# Patient Record
Sex: FEMALE | Race: WHITE | Employment: FULL TIME | URBAN - METROPOLITAN AREA
[De-identification: names, ages, dates, MRNs, and addresses within clinical notes are randomized per-mention and may not be internally consistent; named-entity substitution may affect disease eponyms.]

---

## 2017-04-04 ENCOUNTER — HOSPITAL ENCOUNTER (OUTPATIENT)
Dept: GENERAL RADIOLOGY | Age: 57
Discharge: HOME | End: 2017-04-04
Payer: COMMERCIAL

## 2017-04-04 DIAGNOSIS — M25.552 LEFT HIP PAIN: ICD-10-CM

## 2017-04-04 PROCEDURE — 73502 X-RAY EXAM HIP UNI 2-3 VIEWS: CPT

## 2017-05-10 ENCOUNTER — HOSPITAL ENCOUNTER (OUTPATIENT)
Dept: GENERAL RADIOLOGY | Age: 57
Discharge: HOME | End: 2017-05-10
Payer: COMMERCIAL

## 2017-05-10 DIAGNOSIS — M77.31 CALCANEAL SPUR OF RIGHT FOOT: ICD-10-CM

## 2017-05-10 PROCEDURE — 73630 X-RAY EXAM OF FOOT: CPT

## 2017-06-01 ENCOUNTER — HOSPITAL ENCOUNTER (OUTPATIENT)
Dept: MRI IMAGING | Age: 57
Discharge: HOME | End: 2017-06-01
Payer: COMMERCIAL

## 2017-06-01 DIAGNOSIS — M05.9 SEROPOSITIVE RHEUMATOID ARTHRITIS (HCC): ICD-10-CM

## 2017-06-01 DIAGNOSIS — M16.7 OTHER UNILATERAL SECONDARY OSTEOARTHRITIS OF HIP: ICD-10-CM

## 2017-06-01 DIAGNOSIS — M25.552 LEFT HIP PAIN: ICD-10-CM

## 2017-06-01 PROCEDURE — 73721 MRI JNT OF LWR EXTRE W/O DYE: CPT

## 2017-06-09 ENCOUNTER — APPOINTMENT (OUTPATIENT)
Dept: CT IMAGING | Age: 57
End: 2017-06-09
Attending: EMERGENCY MEDICINE
Payer: COMMERCIAL

## 2017-06-09 ENCOUNTER — HOSPITAL ENCOUNTER (EMERGENCY)
Age: 57
Discharge: HOME | End: 2017-06-09
Attending: EMERGENCY MEDICINE
Payer: COMMERCIAL

## 2017-06-09 VITALS
SYSTOLIC BLOOD PRESSURE: 147 MMHG | WEIGHT: 255.4 LBS | DIASTOLIC BLOOD PRESSURE: 80 MMHG | TEMPERATURE: 97.7 F | RESPIRATION RATE: 14 BRPM | HEART RATE: 65 BPM | HEIGHT: 68 IN | OXYGEN SATURATION: 97 % | BODY MASS INDEX: 38.71 KG/M2

## 2017-06-09 DIAGNOSIS — M54.50 ACUTE BILATERAL LOW BACK PAIN WITHOUT SCIATICA: ICD-10-CM

## 2017-06-09 DIAGNOSIS — G43.009 NONINTRACTABLE MIGRAINE, UNSPECIFIED MIGRAINE TYPE: Primary | ICD-10-CM

## 2017-06-09 LAB
ALBUMIN SERPL BCP-MCNC: 3.3 G/DL (ref 3.5–5)
ALBUMIN/GLOB SERPL: 1 {RATIO} (ref 1.1–2.2)
ALP SERPL-CCNC: 66 U/L (ref 45–117)
ALT SERPL-CCNC: 31 U/L (ref 12–78)
ANION GAP BLD CALC-SCNC: 9 MMOL/L (ref 5–15)
APPEARANCE UR: CLEAR
AST SERPL W P-5'-P-CCNC: 21 U/L (ref 15–37)
BACTERIA URNS QL MICRO: NEGATIVE /HPF
BASOPHILS # BLD AUTO: 0 K/UL (ref 0–0.1)
BASOPHILS # BLD: 1 % (ref 0–1)
BILIRUB SERPL-MCNC: 0.2 MG/DL (ref 0.2–1)
BILIRUB UR QL: NEGATIVE
BUN SERPL-MCNC: 33 MG/DL (ref 6–20)
BUN/CREAT SERPL: 35 (ref 12–20)
CALCIUM SERPL-MCNC: 9.2 MG/DL (ref 8.5–10.1)
CHLORIDE SERPL-SCNC: 107 MMOL/L (ref 97–108)
CO2 SERPL-SCNC: 25 MMOL/L (ref 21–32)
COLOR UR: ABNORMAL
CREAT SERPL-MCNC: 0.93 MG/DL (ref 0.55–1.02)
EOSINOPHIL # BLD: 0.1 K/UL (ref 0–0.4)
EOSINOPHIL NFR BLD: 3 % (ref 0–7)
EPITH CASTS URNS QL MICRO: ABNORMAL /LPF
ERYTHROCYTE [DISTWIDTH] IN BLOOD BY AUTOMATED COUNT: 14.3 % (ref 11.5–14.5)
GLOBULIN SER CALC-MCNC: 3.4 G/DL (ref 2–4)
GLUCOSE SERPL-MCNC: 113 MG/DL (ref 65–100)
GLUCOSE UR STRIP.AUTO-MCNC: NEGATIVE MG/DL
HCT VFR BLD AUTO: 38.5 % (ref 35–47)
HGB BLD-MCNC: 12.3 G/DL (ref 11.5–16)
HGB UR QL STRIP: NEGATIVE
HYALINE CASTS URNS QL MICRO: ABNORMAL /LPF (ref 0–5)
KETONES UR QL STRIP.AUTO: NEGATIVE MG/DL
LEUKOCYTE ESTERASE UR QL STRIP.AUTO: ABNORMAL
LYMPHOCYTES # BLD AUTO: 28 % (ref 12–49)
LYMPHOCYTES # BLD: 1.5 K/UL (ref 0.8–3.5)
MCH RBC QN AUTO: 27.7 PG (ref 26–34)
MCHC RBC AUTO-ENTMCNC: 31.9 G/DL (ref 30–36.5)
MCV RBC AUTO: 86.7 FL (ref 80–99)
MONOCYTES # BLD: 0.3 K/UL (ref 0–1)
MONOCYTES NFR BLD AUTO: 6 % (ref 5–13)
NEUTS SEG # BLD: 3.3 K/UL (ref 1.8–8)
NEUTS SEG NFR BLD AUTO: 62 % (ref 32–75)
NITRITE UR QL STRIP.AUTO: NEGATIVE
PH UR STRIP: 6.5 [PH] (ref 5–8)
PLATELET # BLD AUTO: 242 K/UL (ref 150–400)
POTASSIUM SERPL-SCNC: 4.1 MMOL/L (ref 3.5–5.1)
PROT SERPL-MCNC: 6.7 G/DL (ref 6.4–8.2)
PROT UR STRIP-MCNC: NEGATIVE MG/DL
RBC # BLD AUTO: 4.44 M/UL (ref 3.8–5.2)
RBC #/AREA URNS HPF: ABNORMAL /HPF (ref 0–5)
SODIUM SERPL-SCNC: 141 MMOL/L (ref 136–145)
SP GR UR REFRACTOMETRY: 1.02 (ref 1–1.03)
UA: UC IF INDICATED,UAUC: ABNORMAL
UROBILINOGEN UR QL STRIP.AUTO: 0.2 EU/DL (ref 0.2–1)
WBC # BLD AUTO: 5.3 K/UL (ref 3.6–11)
WBC URNS QL MICRO: ABNORMAL /HPF (ref 0–4)

## 2017-06-09 PROCEDURE — 36415 COLL VENOUS BLD VENIPUNCTURE: CPT | Performed by: EMERGENCY MEDICINE

## 2017-06-09 PROCEDURE — 96375 TX/PRO/DX INJ NEW DRUG ADDON: CPT

## 2017-06-09 PROCEDURE — 96361 HYDRATE IV INFUSION ADD-ON: CPT

## 2017-06-09 PROCEDURE — 74011250636 HC RX REV CODE- 250/636: Performed by: EMERGENCY MEDICINE

## 2017-06-09 PROCEDURE — 70450 CT HEAD/BRAIN W/O DYE: CPT

## 2017-06-09 PROCEDURE — 80053 COMPREHEN METABOLIC PANEL: CPT | Performed by: EMERGENCY MEDICINE

## 2017-06-09 PROCEDURE — 99283 EMERGENCY DEPT VISIT LOW MDM: CPT

## 2017-06-09 PROCEDURE — 96374 THER/PROPH/DIAG INJ IV PUSH: CPT

## 2017-06-09 PROCEDURE — 85025 COMPLETE CBC W/AUTO DIFF WBC: CPT | Performed by: EMERGENCY MEDICINE

## 2017-06-09 PROCEDURE — 81001 URINALYSIS AUTO W/SCOPE: CPT | Performed by: EMERGENCY MEDICINE

## 2017-06-09 RX ORDER — KETOROLAC TROMETHAMINE 30 MG/ML
30 INJECTION, SOLUTION INTRAMUSCULAR; INTRAVENOUS ONCE
Status: COMPLETED | OUTPATIENT
Start: 2017-06-09 | End: 2017-06-09

## 2017-06-09 RX ORDER — METOCLOPRAMIDE HYDROCHLORIDE 5 MG/ML
10 INJECTION INTRAMUSCULAR; INTRAVENOUS
Status: COMPLETED | OUTPATIENT
Start: 2017-06-09 | End: 2017-06-09

## 2017-06-09 RX ADMIN — KETOROLAC TROMETHAMINE 30 MG: 30 INJECTION, SOLUTION INTRAMUSCULAR at 04:05

## 2017-06-09 RX ADMIN — SODIUM CHLORIDE 1000 ML: 900 INJECTION, SOLUTION INTRAVENOUS at 03:44

## 2017-06-09 RX ADMIN — METOCLOPRAMIDE 10 MG: 5 INJECTION, SOLUTION INTRAMUSCULAR; INTRAVENOUS at 03:44

## 2017-06-09 NOTE — ED PROVIDER NOTES
HPI Comments: 60 y.o. female with past medical history significant for migraine, RA, kidney stones who presents accompanied by  with chief complaint of headache. Patient complains of a right sided headache that initially began before she went to bed last night but worsened and woke her up. Patient states she does not often get migraines but her current sx are similar to her episodes. Patient states she is feeling nauseated. Patient states she took 2 Excedrin Migraine with some relief. Patient also complains of some b/l lower back pain that she states began when she awoke. Patient states the pain does not radiate. Patient states she recently had an MRI of her b/l hips due to chronic pain issues. Patient states she is due to see an orthopedic surgeon next week. Patient denies recent illness aside from \"a minimal cold. \" Patient denies numbness, tingling, weakness, or changes in her urine. Patient denies chance of pregnancy. There are no other acute medical concerns at this time. Social hx: former smoker, rare alcohol drinker  PCP: Alexander Hargrove MD    Note written by Osmin James, as dictated by Steven Giordano MD 3:27 AM     The history is provided by the patient. No  was used. Past Medical History:   Diagnosis Date    Kidney stones     Migraine     [facility] arthritis ([facility] [address]     Thyroid disease        Past Surgical History:   Procedure Laterality Date    HX BACK SURGERY      HX  SECTION  ,     HX CHOLECYSTECTOMY      HX HERNIA REPAIR      HX MALIGNANT SKIN LESION EXCISION           Family History:   Problem Relation Age of Onset    Stroke Mother     Stroke Father     Heart Disease Father        Social History     Social History    Marital status:      Spouse name: N/A    Number of children: N/A    Years of education: N/A     Occupational History    Not on file.      Social History Main Topics    Smoking status: Former Smoker    Smokeless tobacco: Not on file    Alcohol use Yes      Comment: Rare    Drug use: Not on file    Sexual activity: Not on file     Other Topics Concern    Not on file     Social History Narrative         ALLERGIES: Bactrim [sulfamethoprim] and Ciprofloxacin    Review of Systems   Constitutional: Negative for chills and fever. HENT: Negative for congestion, nosebleeds and rhinorrhea. Eyes: Negative for pain and redness. Respiratory: Negative for cough and shortness of breath. Cardiovascular: Negative for chest pain and palpitations. Gastrointestinal: Positive for nausea. Negative for abdominal pain and vomiting. Genitourinary: Negative for dysuria, frequency, vaginal bleeding and vaginal pain. Musculoskeletal: Positive for back pain. Negative for myalgias. Skin: Negative for rash and wound. Neurological: Positive for headaches. Negative for seizures, syncope, weakness and numbness. Hematological: Does not bruise/bleed easily. Psychiatric/Behavioral: Negative for agitation, confusion, dysphoric mood and suicidal ideas. The patient is not nervous/anxious. Vitals:    06/09/17 0317   BP: 169/81   Pulse: 78   Resp: 16   Temp: 97.7 °F (36.5 °C)   SpO2: 94%   Weight: 115.8 kg (255 lb 6.4 oz)   Height: 5' 8\" (1.727 m)            Physical Exam   Constitutional: She is oriented to person, place, and time. She appears well-developed and well-nourished. HENT:   Head: Normocephalic and atraumatic. Eyes: EOM are normal. Pupils are equal, round, and reactive to light. Neck: Normal range of motion. Neck supple. No tracheal deviation present. Cardiovascular: Normal rate, regular rhythm, normal heart sounds and intact distal pulses. Pulmonary/Chest: Effort normal and breath sounds normal. No stridor. No respiratory distress. She has no wheezes. She has no rales. She exhibits no tenderness. Abdominal: Soft. Bowel sounds are normal. She exhibits no distension. There is no tenderness.  There is no rebound. Musculoskeletal: Normal range of motion. She exhibits no edema or tenderness. Neurological: She is alert and oriented to person, place, and time. No cranial nerve deficit. Skin: Skin is warm and dry. No rash noted. No pallor. Psychiatric: She has a normal mood and affect. Nursing note and vitals reviewed. Note written by Osmin Brothers, as dictated by Lencho Ignacio MD 3:30 AM      MDM  Number of Diagnoses or Management Options  Diagnosis management comments: 49-year-old female with past history significant for migraines presents with complaints of right-sided typical gradual onset migraine headache with nausea and low back pain. Denies trauma. Patient is well-appearing, in no acute distress, hemodynamically stable, neurologically intact and nontoxic. Plan-pain control, IV fluid hydration, head CT, CBC/CMP/UA. Head CT unremarkable  Labs unremarkable       Amount and/or Complexity of Data Reviewed  Clinical lab tests: ordered and reviewed  Tests in the radiology section of CPT®: ordered and reviewed  Independent visualization of images, tracings, or specimens: yes    Risk of Complications, Morbidity, and/or Mortality  Presenting problems: low  Diagnostic procedures: low  Management options: low    Patient Progress  Patient progress: improved    ED Course       Procedures    4:44 AM  Patient reports feeling much improved. 4:44 AM  Patient's results have been reviewed with them. Patient and/or family have verbally conveyed their understanding and agreement of the patient's signs, symptoms, diagnosis, treatment and prognosis and additionally agree to follow up as recommended or return to the Emergency Room should their condition change prior to follow-up.   Discharge instructions have also been provided to the patient with some educational information regarding their diagnosis as well a list of reasons why they would want to return to the ER prior to their follow-up appointment should their condition change.

## 2017-06-09 NOTE — DISCHARGE INSTRUCTIONS
Migraine Headache: Care Instructions  Your Care Instructions  Migraines are painful, throbbing headaches that often start on one side of the head. They may cause nausea and vomiting and make you sensitive to light, sound, or smell. Without treatment, migraines can last from 4 hours to a few days. Medicines can help prevent migraines or stop them after they have started. Your doctor can help you find which ones work best for you. Follow-up care is a key part of your treatment and safety. Be sure to make and go to all appointments, and call your doctor if you are having problems. It's also a good idea to know your test results and keep a list of the medicines you take. How can you care for yourself at home? · Do not drive if you have taken a prescription pain medicine. · Rest in a quiet, dark room until your headache is gone. Close your eyes, and try to relax or go to sleep. Don't watch TV or read. · Put a cold, moist cloth or cold pack on the painful area for 10 to 20 minutes at a time. Put a thin cloth between the cold pack and your skin. · Use a warm, moist towel or a heating pad set on low to relax tight shoulder and neck muscles. · Have someone gently massage your neck and shoulders. · Take your medicines exactly as prescribed. Call your doctor if you think you are having a problem with your medicine. You will get more details on the specific medicines your doctor prescribes. · Be careful not to take pain medicine more often than the instructions allow. You could get worse or more frequent headaches when the medicine wears off. To prevent migraines  · Keep a headache diary so you can figure out what triggers your headaches. Avoiding triggers may help you prevent headaches. Record when each headache began, how long it lasted, and what the pain was like.  (Was it throbbing, aching, stabbing, or dull?) Write down any other symptoms you had with the headache, such as nausea, flashing lights or dark spots, or sensitivity to bright light or loud noise. Note if the headache occurred near your period. List anything that might have triggered the headache. Triggers may include certain foods (chocolate, cheese, wine) or odors, smoke, bright light, stress, or lack of sleep. · If your doctor has prescribed medicine for your migraines, take it as directed. You may have medicine that you take only when you get a migraine and medicine that you take all the time to help prevent migraines. ¨ If your doctor has prescribed medicine for when you get a headache, take it at the first sign of a migraine, unless your doctor has given you other instructions. ¨ If your doctor has prescribed medicine to prevent migraines, take it exactly as prescribed. Call your doctor if you think you are having a problem with your medicine. · Find healthy ways to deal with stress. Migraines are most common during or right after stressful times. Take time to relax before and after you do something that has caused a migraine in the past.  · Try to keep your muscles relaxed by keeping good posture. Check your jaw, face, neck, and shoulder muscles for tension. Try to relax them. When you sit at a desk, change positions often. And make sure to stretch for 30 seconds each hour. · Get plenty of sleep and exercise. · Eat meals on a regular schedule. Avoid foods and drinks that often trigger migraines. These include chocolate, alcohol (especially red wine and port), aspartame, monosodium glutamate (MSG), and some additives found in foods (such as hot dogs, escoto, cold cuts, aged cheeses, and pickled foods). · Limit caffeine. Don't drink too much coffee, tea, or soda. But don't quit caffeine suddenly. That can also give you migraines. · Do not smoke or allow others to smoke around you. If you need help quitting, talk to your doctor about stop-smoking programs and medicines. These can increase your chances of quitting for good.   · If you are taking birth control pills or hormone therapy, talk to your doctor about whether they are triggering your migraines. When should you call for help? Call 911 anytime you think you may need emergency care. For example, call if:  · You have signs of a stroke. These may include:  ¨ Sudden numbness, paralysis, or weakness in your face, arm, or leg, especially on only one side of your body. ¨ Sudden vision changes. ¨ Sudden trouble speaking. ¨ Sudden confusion or trouble understanding simple statements. ¨ Sudden problems with walking or balance. ¨ A sudden, severe headache that is different from past headaches. Call your doctor now or seek immediate medical care if:  · You have new or worse nausea and vomiting. · You have a new or higher fever. · Your headache gets much worse. Watch closely for changes in your health, and be sure to contact your doctor if:  · You are not getting better after 2 days (48 hours). Where can you learn more? Go to http://marcelinoWhitenoise Networkskofi.info/. Enter X928 in the search box to learn more about \"Migraine Headache: Care Instructions. \"  Current as of: October 14, 2016  Content Version: 11.2  © 6120-7118 [employer]. Care instructions adapted under license by [employer] (which disclaims liability or warranty for this information). If you have questions about a medical condition or this instruction, always ask your healthcare professional. [address] any warranty or liability for your use of this information. Back Pain: Care Instructions  Your Care Instructions    Back pain has many possible causes. It is often related to problems with muscles and ligaments of the back. It may also be related to problems with the nerves, discs, or bones of the back. Moving, lifting, standing, sitting, or sleeping in an awkward way can strain the back. Sometimes you don't notice the injury until later.  Arthritis is another common cause of back pain. Although it may hurt a lot, back pain usually improves on its own within several weeks. Most people recover in 12 weeks or less. Using good home treatment and being careful not to stress your back can help you feel better sooner. Follow-up care is a key part of your treatment and safety. Be sure to make and go to all appointments, and call your doctor if you are having problems. Its also a good idea to know your test results and keep a list of the medicines you take. How can you care for yourself at home? · Sit or lie in positions that are most comfortable and reduce your pain. Try one of these positions when you lie down:  ¨ Lie on your back with your knees bent and supported by large pillows. ¨ Lie on the floor with your legs on the seat of a sofa or chair. Minnie Ceci on your side with your knees and hips bent and a pillow between your legs. ¨ Lie on your stomach if it does not make pain worse. · Do not sit up in bed, and avoid soft couches and twisted positions. Bed rest can help relieve pain at first, but it delays healing. Avoid bed rest after the first day of back pain. · Change positions every 30 minutes. If you must sit for long periods of time, take breaks from sitting. Get up and walk around, or lie in a comfortable position. · Try using a heating pad on a low or medium setting for 15 to 20 minutes every 2 or 3 hours. Try a warm shower in place of one session with the heating pad. · You can also try an ice pack for 10 to 15 minutes every 2 to 3 hours. Put a thin cloth between the ice pack and your skin. · Take pain medicines exactly as directed. ¨ If the doctor gave you a prescription medicine for pain, take it as prescribed. ¨ If you are not taking a prescription pain medicine, ask your doctor if you can take an over-the-counter medicine. · Take short walks several times a day. You can start with 5 to 10 minutes, 3 or 4 times a day, and work up to longer walks.  Walk on level surfaces and avoid hills and stairs until your back is better. · Return to work and other activities as soon as you can. Continued rest without activity is usually not good for your back. · To prevent future back pain, do exercises to stretch and strengthen your back and stomach. Learn how to use good posture, safe lifting techniques, and proper body mechanics. When should you call for help? Call your doctor now or seek immediate medical care if:  · You have new or worsening numbness in your legs. · You have new or worsening weakness in your legs. (This could make it hard to stand up.)  · You lose control of your bladder or bowels. Watch closely for changes in your health, and be sure to contact your doctor if:  · Your pain gets worse. · You are not getting better after 2 weeks. Where can you learn more? Go to http://marcelino-kofi.info/. Enter N256 in the search box to learn more about \"Back Pain: Care Instructions. \"  Current as of: May 23, 2016  Content Version: 11.2  © 9327-7139 [employer]. Care instructions adapted under license by [employer] (which disclaims liability or warranty for this information). If you have questions about a medical condition or this instruction, always ask your healthcare professional. [address] any warranty or liability for your use of this information. Low Back Pain: Exercises  Your Care Instructions  Here are some examples of typical rehabilitation exercises for your condition. Start each exercise slowly. Ease off the exercise if you start to have pain. Your doctor or physical therapist will tell you when you can start these exercises and which ones will work best for you. How to do the exercises  Press-up    1. Lie on your stomach, supporting your body with your forearms. 2. Press your elbows down into the floor to raise your upper back.  As you do this, relax your stomach muscles and allow your back to arch without using your back muscles. As your press up, do not let your hips or pelvis come off the floor. 3. Hold for 15 to 30 seconds, then relax. 4. Repeat 2 to 4 times. Alternate arm and leg (bird dog) exercise    Note: Do this exercise slowly. Try to keep your body straight at all times, and do not let one hip drop lower than the other. 1. Start on the floor, on your hands and knees. 2. Tighten your belly muscles. 3. Raise one leg off the floor, and hold it straight out behind you. Be careful not to let your hip drop down, because that will twist your trunk. 4. Hold for about 6 seconds, then lower your leg and switch to the other leg. 5. Repeat 8 to 12 times on each leg. 6. Over time, work up to holding for 10 to 30 seconds each time. 7. If you feel stable and secure with your leg raised, try raising the opposite arm straight out in front of you at the same time. Knee-to-chest exercise    1. Lie on your back with your knees bent and your feet flat on the floor. 2. Bring one knee to your chest, keeping the other foot flat on the floor (or keeping the other leg straight, whichever feels better on your lower back). 3. Keep your lower back pressed to the floor. Hold for at least 15 to 30 seconds. 4. Relax, and lower the knee to the starting position. 5. Repeat with the other leg. Repeat 2 to 4 times with each leg. 6. To get more stretch, put your other leg flat on the floor while pulling your knee to your chest.  Curl-ups    1. Lie on the floor on your back with your knees bent at a 90-degree angle. Your feet should be flat on the floor, about 12 inches from your buttocks. 2. Cross your arms over your chest. If this bothers your neck, try putting your hands behind your neck (not your head), with your elbows spread apart. 3. Slowly tighten your belly muscles and raise your shoulder blades off the floor.   4. Keep your head in line with your body, and do not press your chin to your chest.  5. Hold this position for 1 or 2 seconds, then slowly lower yourself back down to the floor. 6. Repeat 8 to 12 times. Pelvic tilt exercise    1. Lie on your back with your knees bent. 2. \"Brace\" your stomach. This means to tighten your muscles by pulling in and imagining your belly button moving toward your spine. You should feel like your back is pressing to the floor and your hips and pelvis are rocking back. 3. Hold for about 6 seconds while you breathe smoothly. 4. Repeat 8 to 12 times. Heel dig bridging    1. Lie on your back with both knees bent and your ankles bent so that only your heels are digging into the floor. Your knees should be bent about 90 degrees. 2. Then push your heels into the floor, squeeze your buttocks, and lift your hips off the floor until your shoulders, hips, and knees are all in a straight line. 3. Hold for about 6 seconds as you continue to breathe normally, and then slowly lower your hips back down to the floor and rest for up to 10 seconds. 4. Do 8 to 12 repetitions. Hamstring stretch in doorway    1. Lie on your back in a doorway, with one leg through the open door. 2. Slide your leg up the wall to straighten your knee. You should feel a gentle stretch down the back of your leg. 3. Hold the stretch for at least 15 to 30 seconds. Do not arch your back, point your toes, or bend either knee. Keep one heel touching the floor and the other heel touching the wall. 4. Repeat with your other leg. 5. Do 2 to 4 times for each leg. Hip flexor stretch    1. Kneel on the floor with one knee bent and one leg behind you. Place your forward knee over your foot. Keep your other knee touching the floor. 2. Slowly push your hips forward until you feel a stretch in the upper thigh of your rear leg. 3. Hold the stretch for at least 15 to 30 seconds. Repeat with your other leg. 4. Do 2 to 4 times on each side. Wall sit    1. Stand with your back 10 to 12 inches away from a wall.   2. Lean into the wall until your back is flat against it. 3. Slowly slide down until your knees are slightly bent, pressing your lower back into the wall. 4. Hold for about 6 seconds, then slide back up the wall. 5. Repeat 8 to 12 times. Follow-up care is a key part of your treatment and safety. Be sure to make and go to all appointments, and call your doctor if you are having problems. It's also a good idea to know your test results and keep a list of the medicines you take. Where can you learn more? Go to http://marcelino-kofi.info/. Enter B715 in the search box to learn more about \"Low Back Pain: Exercises. \"  Current as of: May 23, 2016  Content Version: 11.2  © 0630-7818 [employer]. Care instructions adapted under license by [employer] (which disclaims liability or warranty for this information). If you have questions about a medical condition or this instruction, always ask your healthcare professional. [place] [address] any warranty or liability for your use of this information. We hope that we have addressed all of your medical concerns. The examination and treatment you received in the Emergency Department were for an emergent problem and were not intended as complete care. It is important that you follow up with your healthcare provider(s) for ongoing care. If your symptoms worsen or do not improve as expected, and you are unable to reach your usual health care provider(s), you should return to the Emergency Department. Today's healthcare is undergoing tremendous change, and patient satisfaction surveys are one of the many tools to assess the quality of medical care. You may receive a survey from the [employer] regarding your experience in the Emergency Department. I hope that your experience has been completely positive, particularly the medical care that I provided.   As such, please participate in the survey; anything less than excellent does not meet my expectations or intentions. [facility] and [facility] participate in nationally recognized quality of care measures. If your blood pressure is greater than 120/80, as reported below, we urge that you seek medical care to address the potential of high blood pressure, commonly known as hypertension. Hypertension can be hereditary or can be caused by certain medical conditions, pain, stress, or \"white coat syndrome. \"       Please make an appointment with your health care provider(s) for follow up of your Emergency Department visit. VITALS:   Patient Vitals for the past 8 hrs:   Temp Pulse Resp BP SpO2   06/09/17 0317 97.7 °F (36.5 °C) 78 16 169/81 94 %          Thank you for allowing us to provide you with medical care today. We realize that you have many choices for your emergency care needs. Please choose us in the future for any continued health care needs. Danisha Garcia, [address].   Office: [phone]            Recent Results (from the past 24 hour(s))   CBC WITH AUTOMATED DIFF    Collection Time: 06/09/17  3:50 AM   Result Value Ref Range    WBC 5.3 3.6 - 11.0 K/uL    RBC 4.44 3.80 - 5.20 M/uL    HGB 12.3 11.5 - 16.0 g/dL    HCT 38.5 35.0 - 47.0 %    MCV 86.7 80.0 - 99.0 FL    MCH 27.7 26.0 - 34.0 PG    MCHC 31.9 30.0 - 36.5 g/dL    RDW 14.3 11.5 - 14.5 %    PLATELET 464 817 - 890 K/uL    NEUTROPHILS 62 32 - 75 %    LYMPHOCYTES 28 12 - 49 %    MONOCYTES 6 5 - 13 %    EOSINOPHILS 3 0 - 7 %    BASOPHILS 1 0 - 1 %    ABS. NEUTROPHILS 3.3 1.8 - 8.0 K/UL    ABS. LYMPHOCYTES 1.5 0.8 - 3.5 K/UL    ABS. MONOCYTES 0.3 0.0 - 1.0 K/UL    ABS. EOSINOPHILS 0.1 0.0 - 0.4 K/UL    ABS.  BASOPHILS 0.0 0.0 - 0.1 K/UL   METABOLIC PANEL, COMPREHENSIVE    Collection Time: 06/09/17  3:50 AM   Result Value Ref Range    Sodium 141 136 - 145 mmol/L    Potassium 4.1 3.5 - 5.1 mmol/L Chloride 107 97 - 108 mmol/L    CO2 25 21 - 32 mmol/L    Anion gap 9 5 - 15 mmol/L    Glucose 113 (H) 65 - 100 mg/dL    BUN 33 (H) 6 - 20 MG/DL    Creatinine 0.93 0.55 - 1.02 MG/DL    BUN/Creatinine ratio 35 (H) 12 - 20      GFR est AA >60 >60 ml/min/1.73m2    GFR est non-AA >60 >60 ml/min/1.73m2    Calcium 9.2 8.5 - 10.1 MG/DL    Bilirubin, total 0.2 0.2 - 1.0 MG/DL    ALT (SGPT) 31 12 - 78 U/L    AST (SGOT) 21 15 - 37 U/L    Alk. phosphatase 66 45 - 117 U/L    Protein, total 6.7 6.4 - 8.2 g/dL    Albumin 3.3 (L) 3.5 - 5.0 g/dL    Globulin 3.4 2.0 - 4.0 g/dL    A-G Ratio 1.0 (L) 1.1 - 2.2     URINALYSIS W/ REFLEX CULTURE    Collection Time: 06/09/17  3:50 AM   Result Value Ref Range    Color YELLOW/STRAW      Appearance CLEAR CLEAR      Specific gravity 1.017 1.003 - 1.030      pH (UA) 6.5 5.0 - 8.0      Protein NEGATIVE  NEG mg/dL    Glucose NEGATIVE  NEG mg/dL    Ketone NEGATIVE  NEG mg/dL    Bilirubin NEGATIVE  NEG      Blood NEGATIVE  NEG      Urobilinogen 0.2 0.2 - 1.0 EU/dL    Nitrites NEGATIVE  NEG      Leukocyte Esterase SMALL (A) NEG      WBC 0-4 0 - 4 /hpf    RBC 0-5 0 - 5 /hpf    Epithelial cells FEW FEW /lpf    Bacteria NEGATIVE  NEG /hpf    UA:UC IF INDICATED CULTURE NOT INDICATED BY UA RESULT CNI      Hyaline cast 0-2 0 - 5 /lpf       Ct Head Wo Cont    Result Date: 6/9/2017  EXAM:  CT HEAD WO CONT INDICATION:   Headache COMPARISON: Comparison April 25, 2008. TECHNIQUE: Unenhanced CT of the head was performed using 5 mm images. Brain and bone windows were generated. CT dose reduction was achieved through use of a standardized protocol tailored for this examination and automatic exposure control for dose modulation. Adaptive statistical iterative reconstruction (ASIR) was utilized. FINDINGS: The ventricles and sulci are normal in size, shape and configuration and midline. There is no significant white matter disease.  There is no intracranial hemorrhage, extra-axial collection, mass, mass effect or midline shift. The basilar cisterns are open. No acute infarct is identified. The bone windows demonstrate no abnormalities. The visualized portions of the paranasal sinuses and mastoid air cells are clear. IMPRESSION: No interval change.  No acute intracranial abnormality

## 2017-06-09 NOTE — ED TRIAGE NOTES
Triage note: Pt arrives with c/o lower back pain and migraine. Hx kidney stones states this feels similar.

## 2017-06-09 NOTE — ED NOTES
I have reviewed discharge instructions with the patient. The patient verbalized understanding. Time allotted for questions. VSS. Pt ambulatory out of unit with spouse.

## 2017-10-24 ENCOUNTER — APPOINTMENT (OUTPATIENT)
Dept: GENERAL RADIOLOGY | Age: 57
End: 2017-10-24
Attending: EMERGENCY MEDICINE
Payer: COMMERCIAL

## 2017-10-24 ENCOUNTER — HOSPITAL ENCOUNTER (EMERGENCY)
Age: 57
Discharge: HOME | End: 2017-10-24
Attending: EMERGENCY MEDICINE
Payer: COMMERCIAL

## 2017-10-24 VITALS
HEIGHT: 69 IN | OXYGEN SATURATION: 97 % | RESPIRATION RATE: 16 BRPM | TEMPERATURE: 98.3 F | SYSTOLIC BLOOD PRESSURE: 154 MMHG | WEIGHT: 256.13 LBS | BODY MASS INDEX: 37.94 KG/M2 | HEART RATE: 79 BPM | DIASTOLIC BLOOD PRESSURE: 83 MMHG

## 2017-10-24 DIAGNOSIS — I48.91 ATRIAL FIBRILLATION WITH RVR (HCC): Primary | ICD-10-CM

## 2017-10-24 LAB
ALBUMIN SERPL-MCNC: 3.8 G/DL (ref 3.5–5)
ALBUMIN/GLOB SERPL: 0.9 {RATIO} (ref 1.1–2.2)
ALP SERPL-CCNC: 88 U/L (ref 45–117)
ALT SERPL-CCNC: 35 U/L (ref 12–78)
ANION GAP SERPL CALC-SCNC: 7 MMOL/L (ref 5–15)
AST SERPL-CCNC: 32 U/L (ref 15–37)
ATRIAL RATE: 306 BPM
ATRIAL RATE: 70 BPM
BASOPHILS # BLD: 0 K/UL (ref 0–0.1)
BASOPHILS NFR BLD: 0 % (ref 0–1)
BILIRUB SERPL-MCNC: 0.3 MG/DL (ref 0.2–1)
BUN SERPL-MCNC: 27 MG/DL (ref 6–20)
BUN/CREAT SERPL: 25 (ref 12–20)
CALCIUM SERPL-MCNC: 9.1 MG/DL (ref 8.5–10.1)
CALCULATED P AXIS, ECG09: 72 DEGREES
CALCULATED R AXIS, ECG10: 11 DEGREES
CALCULATED R AXIS, ECG10: 21 DEGREES
CALCULATED T AXIS, ECG11: -42 DEGREES
CALCULATED T AXIS, ECG11: 54 DEGREES
CHLORIDE SERPL-SCNC: 106 MMOL/L (ref 97–108)
CK MB CFR SERPL CALC: NORMAL % (ref 0–2.5)
CK MB SERPL-MCNC: <1 NG/ML (ref 5–25)
CK SERPL-CCNC: 109 U/L (ref 26–192)
CO2 SERPL-SCNC: 27 MMOL/L (ref 21–32)
CREAT SERPL-MCNC: 1.07 MG/DL (ref 0.55–1.02)
D DIMER PPP FEU-MCNC: 0.35 MG/L FEU (ref 0–0.65)
DIAGNOSIS, 93000: NORMAL
DIAGNOSIS, 93000: NORMAL
EOSINOPHIL # BLD: 0.1 K/UL (ref 0–0.4)
EOSINOPHIL NFR BLD: 2 % (ref 0–7)
ERYTHROCYTE [DISTWIDTH] IN BLOOD BY AUTOMATED COUNT: 14.2 % (ref 11.5–14.5)
GLOBULIN SER CALC-MCNC: 4.4 G/DL (ref 2–4)
GLUCOSE SERPL-MCNC: 87 MG/DL (ref 65–100)
HCT VFR BLD AUTO: 40.4 % (ref 35–47)
HGB BLD-MCNC: 12.9 G/DL (ref 11.5–16)
LYMPHOCYTES # BLD: 1.7 K/UL (ref 0.8–3.5)
LYMPHOCYTES NFR BLD: 36 % (ref 12–49)
MCH RBC QN AUTO: 27.9 PG (ref 26–34)
MCHC RBC AUTO-ENTMCNC: 31.9 G/DL (ref 30–36.5)
MCV RBC AUTO: 87.3 FL (ref 80–99)
MONOCYTES # BLD: 0.3 K/UL (ref 0–1)
MONOCYTES NFR BLD: 7 % (ref 5–13)
NEUTS SEG # BLD: 2.6 K/UL (ref 1.8–8)
NEUTS SEG NFR BLD: 55 % (ref 32–75)
P-R INTERVAL, ECG05: 142 MS
PLATELET # BLD AUTO: 309 K/UL (ref 150–400)
POTASSIUM SERPL-SCNC: 4.5 MMOL/L (ref 3.5–5.1)
PROT SERPL-MCNC: 8.2 G/DL (ref 6.4–8.2)
Q-T INTERVAL, ECG07: 310 MS
Q-T INTERVAL, ECG07: 426 MS
QRS DURATION, ECG06: 84 MS
QRS DURATION, ECG06: 94 MS
QTC CALCULATION (BEZET), ECG08: 460 MS
QTC CALCULATION (BEZET), ECG08: 494 MS
RBC # BLD AUTO: 4.63 M/UL (ref 3.8–5.2)
SODIUM SERPL-SCNC: 140 MMOL/L (ref 136–145)
TROPONIN I SERPL-MCNC: <0.04 NG/ML
TSH SERPL DL<=0.05 MIU/L-ACNC: 2.41 UIU/ML (ref 0.36–3.74)
VENTRICULAR RATE, ECG03: 153 BPM
VENTRICULAR RATE, ECG03: 70 BPM
WBC # BLD AUTO: 4.8 K/UL (ref 3.6–11)

## 2017-10-24 PROCEDURE — 74011000250 HC RX REV CODE- 250: Performed by: EMERGENCY MEDICINE

## 2017-10-24 PROCEDURE — 71010 XR CHEST SNGL V: CPT

## 2017-10-24 PROCEDURE — 84443 ASSAY THYROID STIM HORMONE: CPT | Performed by: EMERGENCY MEDICINE

## 2017-10-24 PROCEDURE — 93005 ELECTROCARDIOGRAM TRACING: CPT

## 2017-10-24 PROCEDURE — 84484 ASSAY OF TROPONIN QUANT: CPT | Performed by: STUDENT IN AN ORGANIZED HEALTH CARE EDUCATION/TRAINING PROGRAM

## 2017-10-24 PROCEDURE — 93041 RHYTHM ECG TRACING: CPT

## 2017-10-24 PROCEDURE — 80053 COMPREHEN METABOLIC PANEL: CPT | Performed by: STUDENT IN AN ORGANIZED HEALTH CARE EDUCATION/TRAINING PROGRAM

## 2017-10-24 PROCEDURE — 85025 COMPLETE CBC W/AUTO DIFF WBC: CPT | Performed by: STUDENT IN AN ORGANIZED HEALTH CARE EDUCATION/TRAINING PROGRAM

## 2017-10-24 PROCEDURE — 36415 COLL VENOUS BLD VENIPUNCTURE: CPT | Performed by: STUDENT IN AN ORGANIZED HEALTH CARE EDUCATION/TRAINING PROGRAM

## 2017-10-24 PROCEDURE — 74011250636 HC RX REV CODE- 250/636: Performed by: EMERGENCY MEDICINE

## 2017-10-24 PROCEDURE — 99285 EMERGENCY DEPT VISIT HI MDM: CPT

## 2017-10-24 PROCEDURE — 96361 HYDRATE IV INFUSION ADD-ON: CPT

## 2017-10-24 PROCEDURE — 96374 THER/PROPH/DIAG INJ IV PUSH: CPT

## 2017-10-24 PROCEDURE — 82550 ASSAY OF CK (CPK): CPT | Performed by: STUDENT IN AN ORGANIZED HEALTH CARE EDUCATION/TRAINING PROGRAM

## 2017-10-24 PROCEDURE — 85379 FIBRIN DEGRADATION QUANT: CPT | Performed by: EMERGENCY MEDICINE

## 2017-10-24 RX ORDER — TRAMADOL HYDROCHLORIDE 50 MG/1
50 TABLET ORAL
COMMUNITY
End: 2019-05-17

## 2017-10-24 RX ORDER — FERROUS SULFATE, DRIED 160(50) MG
1 TABLET, EXTENDED RELEASE ORAL 2 TIMES DAILY WITH MEALS
COMMUNITY
End: 2017-11-27 | Stop reason: CLARIF

## 2017-10-24 RX ORDER — DEXTROMETHORPHAN HYDROBROMIDE, GUAIFENESIN 5; 100 MG/5ML; MG/5ML
1300 LIQUID ORAL EVERY 12 HOURS
COMMUNITY
End: 2019-12-11

## 2017-10-24 RX ORDER — DULOXETIN HYDROCHLORIDE 60 MG/1
60 CAPSULE, DELAYED RELEASE ORAL
COMMUNITY

## 2017-10-24 RX ORDER — DILTIAZEM HYDROCHLORIDE 5 MG/ML
20 INJECTION INTRAVENOUS
Status: COMPLETED | OUTPATIENT
Start: 2017-10-24 | End: 2017-10-24

## 2017-10-24 RX ADMIN — DILTIAZEM HYDROCHLORIDE 15 MG: 5 INJECTION INTRAVENOUS at 15:33

## 2017-10-24 RX ADMIN — SODIUM CHLORIDE 1000 ML: 900 INJECTION, SOLUTION INTRAVENOUS at 15:31

## 2017-10-24 NOTE — ED NOTES
Bedside and Verbal shift [facility] by [id number] [facility] (offgoing nurse).  Report included the following information SBAR, ED Summary, Intake/Output, MAR and Recent Results, NSR.

## 2017-10-24 NOTE — DISCHARGE INSTRUCTIONS
Learning About Atrial Fibrillation  What is atrial fibrillation? Atrial fibrillation (say \"AY-tree-vish whf-fyrv-AIR-shun\") is the most common type of irregular heartbeat (arrhythmia). Normally, the heart beats in a strong, steady rhythm. In atrial fibrillation, a problem with the heart's electrical system causes the two upper parts of the heart (the atria) to quiver, or fibrillate. Your heart rate also may be faster than normal.  Atrial fibrillation can be dangerous because if the heartbeat isn't strong and steady, blood can collect, or pool, in the atria. And pooled blood is more likely to form clots. Clots can travel to the brain, block blood flow, and cause a stroke. Atrial fibrillation can also lead to heart failure. Treatment for atrial fibrillation helps prevent stroke and heart failure. It also helps relieve symptoms. Atrial fibrillation is often caused by another heart problem. It may happen after heart surgery. It may also be caused by other problems, such as an overactive thyroid gland or lung disease. Many people with atrial fibrillation are able to live full and active lives. What are the symptoms? Some people feel symptoms when they have episodes of atrial fibrillation. But other people don't notice any symptoms. If you have symptoms, you may feel:  · A fluttering, racing, or pounding feeling in your chest called palpitations. · Weak or tired. · Dizzy or lightheaded. · Short of breath. · Chest pain. · Confused. You may notice signs of atrial fibrillation when you check your pulse. Your pulse may seem uneven or fast.  What can you expect when you have atrial fibrillation? At first, spells of atrial fibrillation may come on suddenly and last a short time. It may go away on its own or it goes away after treatment. This is called paroxysmal atrial fibrillation. Over time, the spells may last longer and occur more often. They often don't go away on their own.   How is it treated? Treatments can help you feel better and prevent future problems, especially stroke and heart failure. The main types of treatment slow the heart rate, control the heart rhythm, and help prevent stroke. Your treatment will depend on the cause of your atrial fibrillation, your symptoms, and your risk for stroke. · Heart rate treatment. Medicine may be used to slow your heart rate. Your heartbeat may still be irregular. But these medicines keep your heart from beating too fast. They may also help relieve your symptoms. · Heart rhythm treatment. Different treatments may be used to try to stop atrial fibrillation and keep it from returning. They can also relieve symptoms. These treatments include medicine, electrical cardioversion to shock the heart back to a normal rhythm, a procedure called catheter ablation, and heart surgery. · Stroke prevention. You and your doctor can decide how to lower your risk. You may decide to take a blood-thinning medicine, such as aspirin or an anticoagulant. How can you live well with it? You can live well and help manage atrial fibrillation by having a heart-healthy lifestyle. To have a heart-healthy lifestyle:  · Don't smoke. · Eat heart-healthy foods. · Be active. Talk to your doctor about what type and level of exercise is safe for you. · Stay at a healthy weight. Lose weight if you need to. · Manage stress. · Avoid alcohol if it triggers symptoms. · Manage other health problems such as high blood pressure, high cholesterol, and diabetes. · Avoid getting sick from the flu. Get a flu shot every year. When should you call for help? Call 911 anytime you think you may need emergency care. For example, call if:  · You have symptoms of a stroke. These may include:  ¨ Sudden numbness, tingling, weakness, or loss of movement in your face, arm, or leg, especially on only one side of your body. ¨ Sudden vision changes. ¨ Sudden trouble speaking.   ¨ Sudden confusion or trouble understanding simple statements. ¨ Sudden problems with walking or balance. ¨ A sudden, severe headache that is different from past headaches. Call your doctor now or seek immediate medical care if:  · You have new or increased shortness of breath. · You feel dizzy or lightheaded, or you feel like you may faint. Watch closely for changes in your health, and be sure to contact your doctor if you have any problems. Follow-up care is a key part of your treatment and safety. Be sure to make and go to all appointments, and call your doctor if you are having problems. It's also a good idea to know your test results and keep a list of the medicines you take. Where can you learn more? Go to http://marcelino-kofi.info/. Enter [phone] in the search box to learn more about \"Learning About Atrial Fibrillation. \"  Current as of: March 28, 2016  Content Version: 11.3  © 8519-8645 [employer], [employer]. Care instructions adapted under license by [employer] (which disclaims liability or warranty for this information). If you have questions about a medical condition or this instruction, always ask your healthcare professional. [place] [address] any warranty or liability for your use of this information.

## 2017-10-24 NOTE — ED PROVIDER NOTES
HPI Comments: 59 y.o. female with past medical history significant for rheumatoid arthritis, hypothyroidism, kidney stones, migraines, and melanoma who presents, accompanied by , with chief complaint of palpitations. Patient reports while at work 2 hours ago she had onset of palpitations described as Hedda Bodo it was beating fast.\"  Patient states palpitations caused a dry cough and she felt like she had to breathe \"harder. \"  Patient states she went to the nurse at work and her heart rate was found to be in the 160's, so she was referred to ED. No long travel or recent surgeries. No anticoagulants. No history of cardiac problems, atrial fib/flutter, blood clots. Patient denies chest pain/pressure/tightness, hemoptysis, leg pain/swelling, fever, nausea, vomiting, URI symptoms. There are no other acute medical concerns at this time. Social hx: Former smoker  PCP: Chris Sanches MD    Note written by Yolanda Guaman. Therese Case, as dictated by John Milan, DO 3:29 PM      The history is provided by the patient. Past Medical History:   Diagnosis Date    Kidney stones     [facility] ([facility] [address]     Migraine     Rheumatoid arthritis(714.0)     Thyroid disease        Past Surgical History:   Procedure Laterality Date    HX BACK SURGERY      HX  SECTION  ,     HX CHOLECYSTECTOMY      HX HERNIA REPAIR      HX MALIGNANT SKIN LESION EXCISION           Family History:   Problem Relation Age of Onset    Stroke Mother     Stroke Father     Heart Disease Father        Social History     Social History    Marital status:      Spouse name: N/A    Number of children: N/A    Years of education: N/A     Occupational History    Not on file.      Social History Main Topics    Smoking status: Former Smoker    Smokeless tobacco: Not on file    Alcohol use Yes      Comment: Rare    Drug use: Not on file    Sexual activity: Not on file     Other Topics Concern    Not on file Social History Narrative         ALLERGIES: Bactrim [sulfamethoprim] and Ciprofloxacin    Review of Systems   Constitutional: Negative for fever. HENT: Negative for trouble swallowing. Eyes: Negative for visual disturbance. Respiratory: Positive for cough (dry cough). Cardiovascular: Positive for palpitations. Negative for chest pain. Gastrointestinal: Negative for abdominal pain. Genitourinary: Negative for difficulty urinating. Musculoskeletal: Negative for gait problem. Skin: Negative for rash. Neurological: Negative for headaches. Hematological: Does not bruise/bleed easily. Psychiatric/Behavioral: Negative for sleep disturbance. All other systems reviewed and are negative. Vitals:    10/24/17 1540 10/24/17 1542 10/24/17 1543 10/24/17 1545   BP: 149/73      Pulse: (!) 142 (!) 105 81 74   Resp: 18 12 17 22   Temp:       SpO2: 100% 100% 100% 100%   Weight:       Height:                Physical Exam   Constitutional: She is oriented to person, place, and time. She appears well-developed and well-nourished. HENT:   Head: Normocephalic and atraumatic. Eyes: Conjunctivae are normal.   Neck: Normal range of motion. Cardiovascular: Intact distal pulses. Tachycardia present. Pulmonary/Chest: Effort normal and breath sounds normal. No respiratory distress. Abdominal: Soft. Bowel sounds are normal. There is no tenderness. There is no rebound and no guarding. Musculoskeletal: Normal range of motion. She exhibits edema (trace bilateral lower extremity edema). Neurological: She is alert and oriented to person, place, and time. Skin: Skin is warm and dry. Psychiatric: Her behavior is normal.   Nursing note and vitals reviewed. Note written by Alec Navarrete.  Alcira Lazo, as dictated by Moshe Grey, DO 3:29 PM       MDM  Number of Diagnoses or Management Options  Atrial fibrillation with RVR [facility]):      Amount and/or Complexity of Data Reviewed  Clinical lab tests: ordered and reviewed  Tests in the radiology section of CPT®: ordered and reviewed  Tests in the medicine section of CPT®: ordered and reviewed    Risk of Complications, Morbidity, and/or Mortality  Presenting problems: high  Diagnostic procedures: moderate  Management options: high    Critical Care  Total time providing critical care: 30-74 minutes    Patient Progress  Patient progress: resolved    ED Course   rapid a fib could be related to intrinsic cardiac problem, lyte abnl, thyroid, DM or other metabolic/endocrine cause. Less likely infectious and w/ low wells score, less likely PE. Will check dimer. Low heart score    Procedures      ED EKG interpretation:  Rhythm: atrial flutter; Rate (approx.): 153; Axis: normal; Normal QRS and QT.  S1Q3T3. No STEMI. Note written by Ernesto Labrador. Pietro Lesches, as dictated by Ruben Deras, DO 3:29 PM    3:46 PM  After 15 mg diltiazem, patient in NSR. REPEAT ED EKG interpretation:  Rhythm: normal sinus rhythm; Rate (approx.): 70; Axis: normal; Normal NJ, QRS, QT; S1Q3. No R strain. No ST changes. Note written by Ernesto Labrador. Pietro Lesches, as dictated by Ruben Deras, DO 3:46 PM    4:09 PM  Results back, she feels fine. BP and HR wnl. Will have her f/u w/ cards and pcp to have them decide upon starting any new meds, any further w/u.

## 2017-10-24 NOTE — PROGRESS NOTES
Admission Medication Reconciliation:    Information obtained from:  Patient/RxQuery    Comments/Recommendations: Updated PTA meds/reviewed patient's allergies. 1)  Added:  Cymbalta, Tramadol, APAP, and Calcium+D    2)  Adjusted Synthroid, Folate, and MVI to PM dosing. Adjusted methotrexate dose to 20 mg Q Monday (vs 15 mg Q Monday)    3)  Removed the following patient no longer takes:  pantoprazole, naproxen, and vitD    4)  Last doses as below       Significant PMH/Disease States:   Past Medical History:   Diagnosis Date    Kidney stones     Melanoma ([address]     Migraine     Rheumatoid arthritis(714.0)     Thyroid disease        Chief Complaint for this Admission:    Chief Complaint   Patient presents with    Palpitations       Allergies:  Bactrim [sulfamethoprim] and Ciprofloxacin    Prior to Admission Medications:   Prior to Admission Medications   Prescriptions Last Dose Informant Patient Reported? Taking? DULoxetine (CYMBALTA) 60 mg capsule 10/24/2017 at Unknown time  Yes Yes   Sig: Take 60 mg by mouth daily. acetaminophen (TYLENOL) 650 mg CR tablet 10/24/2017 at Unknown time  Yes Yes   Sig: Take 1,300 mg by mouth every twelve (12) hours. aspirin delayed-release 81 mg tablet 10/24/2017 at Unknown time  Yes Yes   Sig: Take 81 mg by mouth daily. b-complex with vitamin c (VITAMIN B COMPLEX WITH C) cap capsule 10/24/2017 at Unknown time  Yes Yes   Sig: Take 1 Cap by mouth daily. calcium-vitamin D (OYSTER SHELL) 500 mg(1,250mg) -200 unit per tablet 10/24/2017 at Unknown time  Yes Yes   Sig: Take 1 Tab by mouth two (2) times daily (with meals). folic acid (FOLVITE) 1 mg tablet 10/23/2017 at Unknown time  Yes Yes   Sig: Take 1 mg by mouth every evening. hydroxychloroquine (PLAQUENIL) 200 mg tablet 10/24/2017 at Unknown time  Yes Yes   Si mg two (2) times a day. levothyroxine (SYNTHROID) 125 mcg tablet 10/23/2017 at Unknown time  Yes Yes   Sig: Take  by mouth nightly.    methotrexate (RHEUMATREX) 2.5 mg tablet 10/23/2017 at Unknown time  Yes Yes   Sig: Take 20 mg by mouth every Monday. multivitamin (ONE A DAY) tablet 10/23/2017 at Unknown time  Yes Yes   Sig: Take 1 Tab by mouth daily (with dinner). ondansetron (ZOFRAN ODT) 4 mg disintegrating tablet  at PRN Nausea 2/2 migraines  No Yes   Sig: Take 1 Tab by mouth every eight (8) hours as needed for Nausea. potassium citrate (UROCIT-K10) 10 mEq (1,080 mg) TbER 10/24/2017 at Unknown time  Yes Yes   Sig: 10 mEq two (2) times a day. traMADol (ULTRAM) 50 mg tablet 10/20/2017  Yes Yes   Sig: Take 50 mg by mouth two (2) times daily as needed for Pain.       Facility-Administered Medications: None

## 2017-10-24 NOTE — ED TRIAGE NOTES
Pt stated she was at work and started having palpitations , denies cp, denies fever , denies sob, they took her heart rate there and it was 160's, +cough

## 2017-11-07 ENCOUNTER — OFFICE VISIT (OUTPATIENT)
Dept: CARDIOLOGY CLINIC | Age: 57
End: 2017-11-07

## 2017-11-07 VITALS
WEIGHT: 251.4 LBS | SYSTOLIC BLOOD PRESSURE: 130 MMHG | BODY MASS INDEX: 37.67 KG/M2 | DIASTOLIC BLOOD PRESSURE: 80 MMHG | HEART RATE: 72 BPM

## 2017-11-07 DIAGNOSIS — M05.79 RHEUMATOID ARTHRITIS INVOLVING MULTIPLE SITES WITH POSITIVE RHEUMATOID FACTOR (HCC): ICD-10-CM

## 2017-11-07 DIAGNOSIS — I48.92 ATRIAL FLUTTER, UNSPECIFIED TYPE (HCC): ICD-10-CM

## 2017-11-07 DIAGNOSIS — E13.9 OTHER SPECIFIED DIABETES MELLITUS WITHOUT COMPLICATION, WITHOUT LONG-TERM CURRENT USE OF INSULIN (HCC): ICD-10-CM

## 2017-11-07 DIAGNOSIS — E03.9 HYPOTHYROIDISM, UNSPECIFIED TYPE: ICD-10-CM

## 2017-11-07 DIAGNOSIS — E66.09 CLASS 2 OBESITY DUE TO EXCESS CALORIES WITHOUT SERIOUS COMORBIDITY WITH BODY MASS INDEX (BMI) OF 35.0 TO 35.9 IN ADULT: ICD-10-CM

## 2017-11-07 DIAGNOSIS — E66.9 CLASS 2 OBESITY WITHOUT SERIOUS COMORBIDITY WITH BODY MASS INDEX (BMI) OF 37.0 TO 37.9 IN ADULT, UNSPECIFIED OBESITY TYPE: ICD-10-CM

## 2017-11-07 DIAGNOSIS — I49.9 IRREGULAR HEART BEAT: ICD-10-CM

## 2017-11-07 DIAGNOSIS — I47.1 SVT (SUPRAVENTRICULAR TACHYCARDIA) (HCC): Primary | ICD-10-CM

## 2017-11-07 RX ORDER — DILTIAZEM HYDROCHLORIDE 120 MG/1
120 CAPSULE, COATED, EXTENDED RELEASE ORAL
Qty: 30 CAP | Refills: 3 | Status: SHIPPED | OUTPATIENT
Start: 2017-11-07 | End: 2018-02-19 | Stop reason: SDUPTHER

## 2017-11-07 NOTE — MR AVS SNAPSHOT
Visit Information Date & Time Provider Department Dept. Phone Encounter #  
 11/7/2017  8:40 AM Aniyah Moreno MD [facility] Sonya Ivey [phone] [id number] Your Appointments 11/17/2017 10:00 AM  
ECHO CARDIOGRAMS 2D with VASCULAR, [facility] ([facility] SCHEDULING) Appt Note: A flutter [address] [place]  
[address] [address]  
  
    
 11/27/2017  1:40 PM  
Follow Up with Delvin Sierra MD  
[facility] Appt Note: f/u migraines, $50cp cc jrb 11/28/16  
 [address] [facility], 
[address] [place] [address], [address] [phone]  
[address] [facility], [address] [place] [address], [address] [phone]  
  
    
 3/5/2018  3:40 PM  
ESTABLISHED PATIENT with Aniyah Moreno MD  
[facility] Appt Note: 4 month f/u  
 [address] [place] [address]  
[address] [address] Upcoming Health Maintenance Date Due Hepatitis C Screening 1960 HEMOGLOBIN A1C Q6M 1960 LIPID PANEL Q1 1960 FOOT EXAM Q1 7/10/1968 MICROALBUMIN Q1 7/10/1968 EYE EXAM RETINAL OR DILATED Q1 7/10/1968 Pneumococcal 19-64 Highest Risk (1 of 3 - PCV13) 7/10/1977 DTaP/Tdap/Td series (1 - Tdap) 7/10/1979 PAP AKA CERVICAL CYTOLOGY 7/10/1979 BREAST CANCER SCRN MAMMOGRAM 7/10/2008 FOBT Q 1 YEAR AGE 50-75 7/10/2008 INFLUENZA AGE 9 TO ADULT 8/1/2017 Allergies as of 11/7/2017  Review Complete On: 11/7/2017 By: Maycol Gibson Severity Noted Reaction Type Reactions Bactrim [Sulfamethoprim]  11/28/2016    Hives Ciprofloxacin  11/28/2016    Hives Current Immunizations  Never Reviewed No immunizations on file. Not reviewed this visit You Were Diagnosed With   
  
 Codes Comments Irregular heart beat    -  Primary ICD-10-CM: I49.9 ICD-9-CM: 427.9 Other specified diabetes mellitus without complication, without long-term current use of insulin ([facility]     ICD-10-CM: E13.9 ICD-9-CM: 250.00 Class 2 obesity without serious comorbidity with body mass index (BMI) of 37.0 to 37.9 in adult, unspecified obesity type     ICD-10-CM: E66.9, Z62.97 ICD-9-CM: 278.00, V85.37 Hypothyroidism, unspecified type     ICD-10-CM: E03.9 ICD-9-CM: 481. 9 Atrial flutter, unspecified type ([facility]     ICD-10-CM: I48.92 
ICD-9-CM: 427.32 Vitals BP Pulse Weight(growth percentile) BMI OB Status Smoking Status 130/80 (BP 1 Location: Left arm, BP Patient Position: Sitting) 72 251 lb 6.4 oz (114 kg) 37.67 kg/m2 Postmenopausal Former Smoker Vitals History BMI and BSA Data Body Mass Index Body Surface Area  
 37.67 kg/m 2 2.35 m 2 Preferred Pharmacy Pharmacy Name Phone [facility], [address] [phone] Your Updated Medication List  
  
   
This list is accurate as of: 11/7/17  8:48 AM.  Always use your most recent med list.  
  
  
  
  
 acetaminophen 650 mg Tber Commonly known as:  TYLENOL Take 1,300 mg by mouth every twelve (12) hours. aspirin delayed-release 81 mg tablet Take 81 mg by mouth daily. calcium-vitamin D 500 mg(1,250mg) -200 unit per tablet Commonly known as:  OYSTER SHELL Take 1 Tab by mouth two (2) times daily (with meals). CYMBALTA 60 mg capsule Generic drug:  DULoxetine Take 60 mg by mouth daily. dilTIAZem  mg ER capsule Commonly known as:  CARDIZEM CD Take 1 Cap by mouth nightly. folic acid 1 mg tablet Commonly known as:  Google Take 1 mg by mouth every evening. hydroxychloroquine 200 mg tablet Commonly known as:  PLAQUENIL  
200 mg two (2) times a day. levothyroxine 125 mcg tablet Commonly known as:  SYNTHROID Take  by mouth nightly. methotrexate 2.5 mg tablet Commonly known as:  Mary [place]nee Take 20 mg by mouth every Monday. multivitamin tablet Commonly known as:  ONE A DAY Take 1 Tab by mouth daily (with dinner). ondansetron 4 mg disintegrating tablet Commonly known as:  ZOFRAN ODT Take 1 Tab by mouth every eight (8) hours as needed for Nausea. potassium citrate 10 mEq (1,080 mg) Dierdre Epp Commonly known as:  UROCIT-K10  
10 mEq two (2) times a day. traMADol 50 mg tablet Commonly known as:  ULTRAM  
Take 50 mg by mouth two (2) times daily as needed for Pain. VITAMIN B COMPLEX WITH C Cap capsule Generic drug:  b-complex with vitamin c Take 1 Cap by mouth daily. Prescriptions Sent to Pharmacy Refills  
 dilTIAZem CD (CARDIZEM CD) 120 mg ER capsule 3 Sig: Take 1 Cap by mouth nightly. Class: Normal  
 Pharmacy: [address], [address] [place] [address] Ph #: [phone] Route: Oral  
  
We Performed the Following AMB POC EKG ROUTINE W/ 12 LEADS, INTER & REP [69130 CPT(R)] Please provide this summary of care documentation to your next provider. Your primary care clinician is listed as Adriane Haider. If you have any questions after today's visit, please call [phone].

## 2017-11-07 NOTE — PROGRESS NOTES
[place]: Omer Bobo  [phone]  Requesting/referring provider: Gera Benitez  Reason for Consult: Atrial flutter    HPI: Adalberto Townsend, a 59y.o. year-old who presents for evaluation of palpitations, dyspnea, dizziness, swelling. Recenlty seen in the ER for palpitations, heart racing. HR noted to be 142, with afib rvr. Today EKG shows NSR. EKG at that time looked like aflutter, with secondary st changes. Labs otherwise nl. No prior hx. Sx started at work while she was up and moving around. She kept feeling a funny something in the chest and then got a dry cough. They tried ice in the face, etc and no response. Did no break until she was given dilt in the er. The entire thing went on for a few hours. No chest pain, no dizziness, no syncope or near syncope. Does not drink caffeine really, nothing stands out as a trigger. But at times she does have a recurrence that goes away with a cough. Does not have DM. Is taking a 81mg baby aspirin. Discussed mgmt of aflutter, afib, svt. Will begin conservative tx with dilt CD 120mg daily for suppression, with aspirin 81mg daily, escalation if sx progress. Assessment/Plan:  1. Aflutter vs SVT, [id number]=2-0(female, dm)  -cont aspirin, add dilt 120 daily,   2. Chest pain- hx neg cath, placed on PPI  3. Body mass index is 37.67 kg/(m^2). . work on diet and exercise   4. Diabetes-borderline, diet control and weight loss for now   5. Sedentary lifestyle- she has started a walking program with her daughter and her grandson and will aim for 5 days a week  6. RA- discussed the risks of cad associated with RA, javing hip trouble but otherwise doing ok.  -needs lipids checked with PCP    Soc no tob no etoh   Fhx + for CAD in her father who had CABG at 43    Cath normal 2014  She  has a past medical history of Kidney stones; Melanoma ([facility] [address] Migraine; Rheumatoid arthritis(714.0); and Thyroid disease.     Cardiovascular ROS: no chest pain or dyspnea on exertion  Respiratory ROS: no cough, shortness of breath, or wheezing  Neurological ROS: no TIA or stroke symptoms  All other systems negative except as above. PE  Vitals:    11/07/17 0815   BP: 130/80   Pulse: 72   Weight: 251 lb 6.4 oz (114 kg)    Body mass index is 37.67 kg/(m^2).    General appearance - alert, well appearing, and in no distress  Mental status - affect appropriate to mood  Eyes - sclera anicteric, moist mucous membranes  Neck - supple, no significant adenopathy  Lymphatics - no  lymphadenopathy  Chest - clear to auscultation, no wheezes, rales or rhonchi  Heart - normal rate, regular rhythm, normal S1, S2, no murmurs, rubs, clicks or gallops  Abdomen - soft, nontender, nondistended, no masses or organomegaly  Back exam - full range of motion, no tenderness  Neurological - cranial nerves II through XII grossly intact, no focal deficit  Musculoskeletal - no muscular tenderness noted, normal strength  Extremities - peripheral pulses normal, no pedal edema  Skin - normal coloration  no rashes    Recent Labs:  No results found for: CHOL, CHOLX, CHLST, CHOLV, 826050, HDL, LDL, LDLC, DLDLP, TGLX, TRIGL, TRIGP, CHHD, CHHDX  Lab Results   Component Value Date/Time    Creatinine 1.07 10/24/2017 03:09 PM     Lab Results   Component Value Date/Time    BUN 27 10/24/2017 03:09 PM     Lab Results   Component Value Date/Time    Potassium 4.5 10/24/2017 03:09 PM     No results found for: HBA1C, HGBE8, EGS1VUZE, WPO6MRGG  Lab Results   Component Value Date/Time    HGB 12.9 10/24/2017 03:09 PM     Lab Results   Component Value Date/Time    PLATELET 036 77/36/0903 03:09 PM       Reviewed:  Past Medical History:   Diagnosis Date    Kidney stones     Melanoma ([address]     Migraine     Rheumatoid arthritis(714.0)     Thyroid disease      History   Smoking Status    Former Smoker   Smokeless Tobacco    Never Used     Comment: when she was 14     History   Alcohol Use    Yes     Comment: Rare     Allergies Allergen Reactions    Bactrim [Sulfamethoprim] Hives    Ciprofloxacin Hives       Current Outpatient Prescriptions   Medication Sig    dilTIAZem CD (CARDIZEM CD) 120 mg ER capsule Take 1 Cap by mouth nightly.  calcium-vitamin D (OYSTER SHELL) 500 mg(1,250mg) -200 unit per tablet Take 1 Tab by mouth two (2) times daily (with meals).  DULoxetine (CYMBALTA) 60 mg capsule Take 60 mg by mouth daily.  traMADol (ULTRAM) 50 mg tablet Take 50 mg by mouth two (2) times daily as needed for Pain.  acetaminophen (TYLENOL) 650 mg CR tablet Take 1,300 mg by mouth every twelve (12) hours.  potassium citrate (UROCIT-K10) 10 mEq (1,080 mg) TbER 10 mEq two (2) times a day.  hydroxychloroquine (PLAQUENIL) 200 mg tablet 200 mg two (2) times a day.  ondansetron (ZOFRAN ODT) 4 mg disintegrating tablet Take 1 Tab by mouth every eight (8) hours as needed for Nausea.  aspirin delayed-release 81 mg tablet Take 81 mg by mouth daily.  b-complex with vitamin c (VITAMIN B COMPLEX WITH C) cap capsule Take 1 Cap by mouth daily.  folic acid (FOLVITE) 1 mg tablet Take 1 mg by mouth every evening.  methotrexate (RHEUMATREX) 2.5 mg tablet Take 20 mg by mouth every Monday.  levothyroxine (SYNTHROID) 125 mcg tablet Take  by mouth nightly.  multivitamin (ONE A DAY) tablet Take 1 Tab by mouth daily (with dinner). No current facility-administered medications for this visit.         Chad Diallo MD  [facility] [place]  [address] [facility]  [address]

## 2017-11-17 ENCOUNTER — CLINICAL SUPPORT (OUTPATIENT)
Dept: CARDIOLOGY CLINIC | Age: 57
End: 2017-11-17

## 2017-11-17 DIAGNOSIS — I48.92 ATRIAL FLUTTER, UNSPECIFIED TYPE (HCC): Primary | ICD-10-CM

## 2017-11-27 ENCOUNTER — OFFICE VISIT (OUTPATIENT)
Dept: NEUROLOGY | Age: 57
End: 2017-11-27

## 2017-11-27 VITALS
WEIGHT: 253 LBS | SYSTOLIC BLOOD PRESSURE: 146 MMHG | HEIGHT: 69 IN | OXYGEN SATURATION: 97 % | HEART RATE: 81 BPM | DIASTOLIC BLOOD PRESSURE: 84 MMHG | BODY MASS INDEX: 37.47 KG/M2

## 2017-11-27 DIAGNOSIS — M96.1 LUMBAR POST-LAMINECTOMY SYNDROME: ICD-10-CM

## 2017-11-27 DIAGNOSIS — G43.109 MIGRAINE WITH AURA AND WITHOUT STATUS MIGRAINOSUS, NOT INTRACTABLE: Primary | ICD-10-CM

## 2017-11-27 RX ORDER — CALCIUM CARBONATE 500(1250)
2 TABLET ORAL
COMMUNITY

## 2017-11-27 NOTE — LETTER
2017 9:00 PM 
 
Patient:  Adalberto Townsend YOB: 1960 Date of Visit: 2017 Dear No Recipients: Thank you for referring Ms. Adalberto Townsend to me for evaluation/treatment. Below are the relevant portions of my assessment and plan of care. Consult REFERRED BY: 
Oscar Hill MD 
 
CHIEF COMPLAINT: Migraines and kidney stones Subjective:  
 
Adalberto Townsend is a 59 y.o. right-handed  female seen for evaluation of problems of migraine headaches at the request of Dr. Gera Benitez. Patient has tapered herself off Topamax, and basically has no recurrent headaches at this time and is doing well. She developed kidney stones well on Topamax and taper herself off 1 year ago, and is doing very well at this time without any major recurring headaches that do not respond to over-the-counter medications. The patient has a long history of migraines that had done much better on Topamax 100 mg twice a day, but she has had 7 kidney stones and we told her she needs to get off the Topamax, even though her urologist said he thought it was more of an lack of acid then related to Topamax. Has had no new focal weakness, sensory loss, fever, meningismus, cranial nerve problems, gait problems, and remains neurologically stable. She is past menopause now and in general her headaches have been getting better. There was no other clear cause for the headaches. She has had no other focal weakness sensory loss, visual changes, head trauma, new medications, toxin exposure, or any other precipitating events for the headaches. Past Medical History:  
Diagnosis Date  Kidney stones  Melanoma ([facility] [address]  Migraine  Rheumatoid arthritis(714.0)  Thyroid disease Past Surgical History:  
Procedure Laterality Date  HX BACK SURGERY    
 HX  SECTION  [address], [address]  HX HERNIA REPAIR    
 HX MALIGNANT SKIN LESION EXCISION    
 
 Family History Problem Relation Age of Onset  Stroke Mother  Stroke Father  Heart Disease Father Social History Substance Use Topics  Smoking status: Former Smoker  Smokeless tobacco: Never Used Comment: when she was 10  Alcohol use Yes Comment: Rare Current Outpatient Prescriptions:  
  calcium carbonate (OS-LIAM) 500 mg calcium (1,250 mg) tablet, Take  by mouth daily. , Disp: , Rfl:  
  dilTIAZem CD (CARDIZEM CD) 120 mg ER capsule, Take 1 Cap by mouth nightly., Disp: 30 Cap, Rfl: 3 
  DULoxetine (CYMBALTA) 60 mg capsule, Take 30 mg by mouth daily. , Disp: , Rfl:  
  acetaminophen (TYLENOL) 650 mg CR tablet, Take 1,300 mg by mouth every twelve (12) hours. , Disp: , Rfl:  
  potassium citrate (UROCIT-K10) 10 mEq (1,080 mg) TbER, 10 mEq two (2) times a day., Disp: , Rfl: 0 
  hydroxychloroquine (PLAQUENIL) 200 mg tablet, 200 mg two (2) times a day., Disp: , Rfl: 0 
  ondansetron (ZOFRAN ODT) 4 mg disintegrating tablet, Take 1 Tab by mouth every eight (8) hours as needed for Nausea., Disp: 20 Tab, Rfl: 11 
  aspirin delayed-release 81 mg tablet, Take 81 mg by mouth daily. , Disp: , Rfl:  
  b-complex with vitamin c (VITAMIN B COMPLEX WITH C) cap capsule, Take 1 Cap by mouth daily. , Disp: , Rfl:  
  folic acid (FOLVITE) 1 mg tablet, Take 1 mg by mouth every evening., Disp: , Rfl:  
  methotrexate (RHEUMATREX) 2.5 mg tablet, Take 20 mg by mouth every Monday., Disp: , Rfl:  
  levothyroxine (SYNTHROID) 125 mcg tablet, Take  by mouth nightly., Disp: , Rfl:  
  multivitamin (ONE A DAY) tablet, Take 1 Tab by mouth daily (with dinner). , Disp: , Rfl:  
  traMADol (ULTRAM) 50 mg tablet, Take 50 mg by mouth two (2) times daily as needed for Pain., Disp: , Rfl:  
 
 
 
Allergies Allergen Reactions  Bactrim [Sulfamethoprim] Hives  Ciprofloxacin Hives Review of Systems: A comprehensive review of systems was negative except for: Genitourinary: positive for Kidney stones Neurological: positive for headaches Vitals:  
 11/27/17 1356 BP: 146/84 Pulse: 81 SpO2: 97% Weight: 253 lb (114.8 kg) Height: 5' 8.5\" (1.74 m) Objective: I 
 
 
NEUROLOGICAL EXAM: 
 
Appearance: The patient is well developed, well nourished, provides a coherent history and is in no acute distress. Mental Status: Oriented to time, place and person, and the president, cognitive function is normal and speech is fluent and no aphasia or dysarthria. Mood and affect appropriate. Cranial Nerves:   Intact visual fields. Fundi are benign. LEN, EOM's full, no nystagmus, no ptosis. Facial sensation is normal. Corneal reflexes are not tested. Facial movement is symmetric. Hearing is normal bilaterally. Palate is midline with normal sternocleidomastoid and trapezius muscles are normal. Tongue is midline. Neck without meningismus or bruits Motor:  5/5 strength in upper and lower proximal and distal muscles. Normal bulk and tone. No fasciculations. Reflexes:   Deep tendon reflexes 2+/4 and symmetrical. 
No babinski or clonus present Sensory:   Normal to touch, pinprick and vibration. DSS is intact Gait:  Normal gait except that patient limps because of back problems and hip pain . Tremor:   No tremor noted. Cerebellar:  No cerebellar signs present. Neurovascular:  Normal heart sounds and regular rhythm, peripheral pulses intact, and no carotid bruits. Assessment: ICD-10-CM ICD-9-CM 1. Migraine with aura and without status migrainosus, not intractable G43.109 346.00 2. Lumbar post-laminectomy syndrome M96.1 722.83 Plan:  
 
Patient is to remain off Topamax, she will take over-the-counter medications as needed Patient will call back if any problem or increased headaches and we may need to prescribe another prophylactic medication. Patient will continue Excedrin as needed She is to continue to remain mentally and physically active, exercise regular, avoid precipitating factors for her headaches, and call if any problem in the interim. We will see her again as needed since she is basically asymptomatic, and she will call back if any problem or recurrent headaches in the future. Signed By: Henry Cervantes MD   
 November 27, 2017 CC: Gladys Anthony MD 
FAX: [phone] If you have questions, please do not hesitate to call me. I look forward to following Ms. Mortimer Alf along with you. Sincerely, Henry Cervantes MD

## 2017-11-27 NOTE — MR AVS SNAPSHOT
Visit Information Date & Time Provider Department Dept. Phone Encounter #  
 11/27/2017  1:40 PM Rigoberto Quiros MD [facility] [phone] [id number] Follow-up Instructions Return if symptoms worsen or fail to improve. Your Appointments 3/12/2018  3:40 PM  
ESTABLISHED PATIENT with Jennifer Yao MD  
[facility] ([facility]) Appt Note: 4 month f/u; 4 month f/u rs'd 3/5 to 3/12 due to Dr. Kaci Jovel being out of the [facility] [address]  [address] [place] [address]  
[address] [id number] Upcoming Health Maintenance Date Due Hepatitis C Screening 1960 HEMOGLOBIN A1C Q6M 1960 LIPID PANEL Q1 1960 FOOT EXAM Q1 7/10/1968 MICROALBUMIN Q1 7/10/1968 EYE EXAM RETINAL OR DILATED Q1 7/10/1968 Pneumococcal 19-64 Highest Risk (1 of 3 - PCV13) 7/10/1977 DTaP/Tdap/Td series (1 - Tdap) 7/10/1979 PAP AKA CERVICAL CYTOLOGY 7/10/1979 BREAST CANCER SCRN MAMMOGRAM 7/10/2008 FOBT Q 1 YEAR AGE 50-75 7/10/2008 Influenza Age 5 to Adult 8/1/2017 Allergies as of 11/27/2017  Review Complete On: 11/27/2017 By: Johnna Reynolds Severity Noted Reaction Type Reactions Bactrim [Sulfamethoprim]  11/28/2016    Hives Ciprofloxacin  11/28/2016    Hives Current Immunizations  Never Reviewed No immunizations on file. Not reviewed this visit You Were Diagnosed With   
  
 Codes Comments Migraine with aura and without status migrainosus, not intractable    -  Primary ICD-10-CM: G43.109 ICD-9-CM: 346.00 Lumbar post-laminectomy syndrome     ICD-10-CM: M96.1 ICD-9-CM: 722.83 Vitals BP Pulse Height(growth percentile) Weight(growth percentile) SpO2 BMI  
 146/84 81 5' 8.5\" (1.74 m) 253 lb (114.8 kg) 97% 37.91 kg/m2 OB Status Smoking Status Postmenopausal Former Smoker BMI and BSA Data Body Mass Index Body Surface Area  
 37.91 kg/m 2 2.36 m 2 Preferred Pharmacy Pharmacy Name Phone [facility] [phone] Your Updated Medication List  
  
   
This list is accurate as of: 11/27/17  2:07 PM.  Always use your most recent med list.  
  
  
  
  
 acetaminophen 650 mg Tber Commonly known as:  TYLENOL Take 1,300 mg by mouth every twelve (12) hours. aspirin delayed-release 81 mg tablet Take 81 mg by mouth daily. calcium carbonate 500 mg calcium (1,250 mg) tablet Commonly known as:  OS-LIAM Take  by mouth daily. CYMBALTA 60 mg capsule Generic drug:  DULoxetine Take 30 mg by mouth daily. dilTIAZem  mg ER capsule Commonly known as:  CARDIZEM CD Take 1 Cap by mouth nightly. folic acid 1 mg tablet Commonly known as:  [employer] Take 1 mg by mouth every evening. hydroxychloroquine 200 mg tablet Commonly known as:  PLAQUENIL  
200 mg two (2) times a day. levothyroxine 125 mcg tablet Commonly known as:  SYNTHROID Take  by mouth nightly. methotrexate 2.5 mg tablet Commonly known as:  Claude Ruse Take 20 mg by mouth every Monday. multivitamin tablet Commonly known as:  ONE A DAY Take 1 Tab by mouth daily (with dinner). ondansetron 4 mg disintegrating tablet Commonly known as:  ZOFRAN ODT Take 1 Tab by mouth every eight (8) hours as needed for Nausea. potassium citrate 10 mEq (1,080 mg) Hebert Ayala Commonly known as:  UROCIT-K10  
10 mEq two (2) times a day. traMADol 50 mg tablet Commonly known as:  ULTRAM  
Take 50 mg by mouth two (2) times daily as needed for Pain. VITAMIN B COMPLEX WITH C Cap capsule Generic drug:  b-complex with vitamin c Take 1 Cap by mouth daily. Follow-up Instructions Return if symptoms worsen or fail to improve. Patient Instructions A Healthy Lifestyle: Care Instructions Your Care Instructions A healthy lifestyle can help you feel good, stay at a healthy weight, and have plenty of energy for both work and play. A healthy lifestyle is something you can share with your whole family. A healthy lifestyle also can lower your risk for serious health problems, such as high blood pressure, heart disease, and diabetes. You can follow a few steps listed below to improve your health and the health of your family. Follow-up care is a key part of your treatment and safety. Be sure to make and go to all appointments, and call your doctor if you are having problems. It's also a good idea to know your test results and keep a list of the medicines you take. How can you care for yourself at home? · Do not eat too much sugar, fat, or fast foods. You can still have dessert and treats now and then. The goal is moderation. · Start small to improve your eating habits. Pay attention to portion sizes, drink less juice and soda pop, and eat more fruits and vegetables. ¨ Eat a healthy amount of food. A 3-ounce serving of meat, for example, is about the size of a deck of cards. Fill the rest of your plate with vegetables and whole grains. ¨ Limit the amount of soda and sports drinks you have every day. Drink more water when you are thirsty. ¨ Eat at least 5 servings of fruits and vegetables every day. It may seem like a lot, but it is not hard to reach this goal. A serving or helping is 1 piece of fruit, 1 cup of vegetables, or 2 cups of leafy, raw vegetables. Have an apple or some carrot sticks as an afternoon snack instead of a candy bar. Try to have fruits and/or vegetables at every meal. 
· Make exercise part of your daily routine. You may want to start with simple activities, such as walking, bicycling, or slow swimming. Try to be active 30 to 60 minutes every day.  You do not need to do all 30 to 60 minutes all at once. For example, you can exercise 3 times a day for 10 or 20 minutes. Moderate exercise is safe for most people, but it is always a good idea to talk to your doctor before starting an exercise program. 
· Keep moving. Paola Grover the lawn, work in the garden, or [employer]. Take the stairs instead of the elevator at work. · If you smoke, quit. People who smoke have an increased risk for heart attack, stroke, cancer, and other lung illnesses. Quitting is hard, but there are ways to boost your chance of quitting tobacco for good. ¨ Use nicotine gum, patches, or lozenges. ¨ Ask your doctor about stop-smoking programs and medicines. ¨ Keep trying. In addition to reducing your risk of diseases in the future, you will notice some benefits soon after you stop using tobacco. If you have shortness of breath or asthma symptoms, they will likely get better within a few weeks after you quit. · Limit how much alcohol you drink. Moderate amounts of alcohol (up to 2 drinks a day for men, 1 drink a day for women) are okay. But drinking too much can lead to liver problems, high blood pressure, and other health problems. Family health If you have a family, there are many things you can do together to improve your health. · Eat meals together as a family as often as possible. · Eat healthy foods. This includes fruits, vegetables, lean meats and dairy, and whole grains. · Include your family in your fitness plan. Most people think of activities such as jogging or tennis as the way to fitness, but there are many ways you and your family can be more active. Anything that makes you breathe hard and gets your heart pumping is exercise. Here are some tips: 
¨ Walk to do errands or to take your child to school or the bus. ¨ Go for a family bike ride after dinner instead of watching TV. Where can you learn more? Go to http://marcelino-kofi.info/. Enter I336 in the search box to learn more about \"A Healthy Lifestyle: Care Instructions. \" Current as of: May 12, 2017 Content Version: 11.4 © 2646-3117 Healthwise, [employer]. Care instructions adapted under license by [employer] (which disclaims liability or warranty for this information). If you have questions about a medical condition or this instruction, always ask your healthcare professional. [address] any warranty or liability for your use of this information. Please provide this summary of care documentation to your next provider. Your primary care clinician is listed as Rick Kelley. If you have any questions after today's visit, please call [phone].

## 2017-11-27 NOTE — PATIENT INSTRUCTIONS

## 2017-11-28 NOTE — PROGRESS NOTES
Consult  REFERRED BY:  Erick Villagran MD    CHIEF COMPLAINT: Migraines and kidney stones      Subjective:     Gurvinder Moore is a 59 y.o. right-handed  female seen for evaluation of problems of migraine headaches at the request of Dr. Jasbir Andrade. Patient has tapered herself off Topamax, and basically has no recurrent headaches at this time and is doing well. She developed kidney stones well on Topamax and taper herself off 1 year ago, and is doing very well at this time without any major recurring headaches that do not respond to over-the-counter medications. The patient has a long history of migraines that had done much better on Topamax 100 mg twice a day, but she has had 7 kidney stones and we told her she needs to get off the Topamax, even though her urologist said he thought it was more of an lack of acid then related to Topamax. Has had no new focal weakness, sensory loss, fever, meningismus, cranial nerve problems, gait problems, and remains neurologically stable. She is past menopause now and in general her headaches have been getting better. There was no other clear cause for the headaches. She has had no other focal weakness sensory loss, visual changes, head trauma, new medications, toxin exposure, or any other precipitating events for the headaches.     Past Medical History:   Diagnosis Date    Kidney stones     Melanoma ([address]     Migraine     Rheumatoid arthritis(714.0)     Thyroid disease       Past Surgical History:   Procedure Laterality Date    HX BACK SURGERY      HX  SECTION  ,     HX CHOLECYSTECTOMY      HX HERNIA REPAIR      HX MALIGNANT SKIN LESION EXCISION       Family History   Problem Relation Age of Onset    Stroke Mother     Stroke Father     Heart Disease Father       Social History   Substance Use Topics    Smoking status: Former Smoker    Smokeless tobacco: Never Used      Comment: when she was 10    Alcohol use Yes      Comment: Rare Current Outpatient Prescriptions:     calcium carbonate (OS-LIAM) 500 mg calcium (1,250 mg) tablet, Take  by mouth daily. , Disp: , Rfl:     dilTIAZem CD (CARDIZEM CD) 120 mg ER capsule, Take 1 Cap by mouth nightly., Disp: 30 Cap, Rfl: 3    DULoxetine (CYMBALTA) 60 mg capsule, Take 30 mg by mouth daily. , Disp: , Rfl:     acetaminophen (TYLENOL) 650 mg CR tablet, Take 1,300 mg by mouth every twelve (12) hours. , Disp: , Rfl:     potassium citrate (UROCIT-K10) 10 mEq (1,080 mg) TbER, 10 mEq two (2) times a day., Disp: , Rfl: 0    hydroxychloroquine (PLAQUENIL) 200 mg tablet, 200 mg two (2) times a day., Disp: , Rfl: 0    ondansetron (ZOFRAN ODT) 4 mg disintegrating tablet, Take 1 Tab by mouth every eight (8) hours as needed for Nausea., Disp: 20 Tab, Rfl: 11    aspirin delayed-release 81 mg tablet, Take 81 mg by mouth daily. , Disp: , Rfl:     b-complex with vitamin c (VITAMIN B COMPLEX WITH C) cap capsule, Take 1 Cap by mouth daily. , Disp: , Rfl:     folic acid (FOLVITE) 1 mg tablet, Take 1 mg by mouth every evening., Disp: , Rfl:     methotrexate (RHEUMATREX) 2.5 mg tablet, Take 20 mg by mouth every Monday., Disp: , Rfl:     levothyroxine (SYNTHROID) 125 mcg tablet, Take  by mouth nightly., Disp: , Rfl:     multivitamin (ONE A DAY) tablet, Take 1 Tab by mouth daily (with dinner). , Disp: , Rfl:     traMADol (ULTRAM) 50 mg tablet, Take 50 mg by mouth two (2) times daily as needed for Pain., Disp: , Rfl:         Allergies   Allergen Reactions    Bactrim [Sulfamethoprim] Hives    Ciprofloxacin Hives        Review of Systems:  A comprehensive review of systems was negative except for: Genitourinary: positive for Kidney stones  Neurological: positive for headaches   Vitals:    11/27/17 1356   BP: 146/84   Pulse: 81   SpO2: 97%   Weight: 253 lb (114.8 kg)   Height: 5' 8.5\" (1.74 m)     Objective:     I      NEUROLOGICAL EXAM:    Appearance:   The patient is well developed, well nourished, provides a coherent history and is in no acute distress. Mental Status: Oriented to time, place and person, and the president, cognitive function is normal and speech is fluent and no aphasia or dysarthria. Mood and affect appropriate. Cranial Nerves:   Intact visual fields. Fundi are benign. LEN, EOM's full, no nystagmus, no ptosis. Facial sensation is normal. Corneal reflexes are not tested. Facial movement is symmetric. Hearing is normal bilaterally. Palate is midline with normal sternocleidomastoid and trapezius muscles are normal. Tongue is midline. Neck without meningismus or bruits   Motor:  5/5 strength in upper and lower proximal and distal muscles. Normal bulk and tone. No fasciculations. Reflexes:   Deep tendon reflexes 2+/4 and symmetrical.  No babinski or clonus present   Sensory:   Normal to touch, pinprick and vibration. DSS is intact   Gait:  Normal gait except that patient limps because of back problems and hip pain . Tremor:   No tremor noted. Cerebellar:  No cerebellar signs present. Neurovascular:  Normal heart sounds and regular rhythm, peripheral pulses intact, and no carotid bruits. Assessment:       ICD-10-CM ICD-9-CM    1. Migraine with aura and without status migrainosus, not intractable G43.109 346.00    2. Lumbar post-laminectomy syndrome M96.1 722.83          Plan:     Patient is to remain off Topamax, she will take over-the-counter medications as needed  Patient will call back if any problem or increased headaches and we may need to prescribe another prophylactic medication. Patient will continue Excedrin as needed   She is to continue to remain mentally and physically active, exercise regular, avoid precipitating factors for her headaches, and call if any problem in the interim. We will see her again as needed since she is basically asymptomatic, and she will call back if any problem or recurrent headaches in the future.     Signed By: Nurys Riojas MD     November 27, 2017 CC: Callie Aguillon MD  FAX: [phone]

## 2018-02-19 RX ORDER — DILTIAZEM HYDROCHLORIDE 120 MG/1
CAPSULE, COATED, EXTENDED RELEASE ORAL
Qty: 30 CAP | Refills: 3 | Status: SHIPPED | OUTPATIENT
Start: 2018-02-19 | End: 2018-03-12 | Stop reason: SDUPTHER

## 2018-03-12 ENCOUNTER — OFFICE VISIT (OUTPATIENT)
Dept: CARDIOLOGY CLINIC | Age: 58
End: 2018-03-12

## 2018-03-12 VITALS
DIASTOLIC BLOOD PRESSURE: 90 MMHG | BODY MASS INDEX: 38.42 KG/M2 | HEIGHT: 69 IN | WEIGHT: 259.4 LBS | SYSTOLIC BLOOD PRESSURE: 140 MMHG | HEART RATE: 66 BPM

## 2018-03-12 DIAGNOSIS — M06.9 RHEUMATOID ARTHRITIS, INVOLVING UNSPECIFIED SITE, UNSPECIFIED RHEUMATOID FACTOR PRESENCE: ICD-10-CM

## 2018-03-12 DIAGNOSIS — E66.09 CLASS 2 OBESITY DUE TO EXCESS CALORIES WITHOUT SERIOUS COMORBIDITY WITH BODY MASS INDEX (BMI) OF 35.0 TO 35.9 IN ADULT: ICD-10-CM

## 2018-03-12 DIAGNOSIS — E11.21 TYPE 2 DIABETES WITH NEPHROPATHY (HCC): ICD-10-CM

## 2018-03-12 DIAGNOSIS — I47.1 SVT (SUPRAVENTRICULAR TACHYCARDIA) (HCC): Primary | ICD-10-CM

## 2018-03-12 RX ORDER — DILTIAZEM HYDROCHLORIDE 180 MG/1
180 CAPSULE, COATED, EXTENDED RELEASE ORAL DAILY
Qty: 90 CAP | Refills: 3 | Status: SHIPPED | OUTPATIENT
Start: 2018-03-12 | End: 2019-01-21 | Stop reason: SDUPTHER

## 2018-03-12 NOTE — PROGRESS NOTES
[facility] [place]: Judge Rodriguez  [phone]  Requesting/referring provider: Felix Stewart  Reason for Consult: Atrial flutter    HPI: Tiffany Valencia, a 59y.o. year-old who presents for evaluation of palpitations, dyspnea, dizziness, swelling. Thinks she is having palpitations 2x a month or so, just a few minutes at a time, she thinks it has been afib but shot and self-limited. Has some wooziness and hears her heartbeat at times. Bp upper normal today. Sat it was high as well. 136/88. Today again discussed afib/flutter/svt and cva risk. She is a hgmsd0zgfl=5 and will continue on aspirin 81mg only for now. Does not drink caffeine really, nothing stands out as a trigger. But at times she does have a recurrence that goes away with a cough. Does not have DM. Is taking a 81mg baby aspirin. Does not have diagnosis of HTN. Discussed mgmt of aflutter, afib, svt. Will begin conservative tx with dilt CD 120mg daily for suppression, with aspirin 81mg daily, escalation if sx progress. Assessment/Plan:  1. Aflutter vs SVT, [id number]=5(female)  -cont aspirin, a few breakthroughs will take 180mg dilt and see if that helps. Discussed myzvc3gngr risk score versus risk of complication from oac. 2. Chest pain- hx neg cath, placed on PPI  3. Body mass index is 38.87 kg/(m^2). . work on diet and exercise   4. Diabetes-borderline, diet control and weight loss for now   5. Sedentary lifestyle- she has started a walking program with her daughter and her grandson and will aim for 5 days a week  6. RA- discussed the risks of cad associated with RA, javing hip trouble but otherwise doing ok. Soc no tob no etoh   Fhx + for CAD in her father who had CABG at 43    Cath normal 2014  She  has a past medical history of Kidney stones; Melanoma ([address] Migraine; Rheumatoid arthritis(714.0); and Thyroid disease.     Cardiovascular ROS: no chest pain or dyspnea on exertion  Respiratory ROS: no cough, shortness of breath, or wheezing  Neurological ROS: no TIA or stroke symptoms  All other systems negative except as above. PE  Vitals:    03/12/18 1538   BP: 140/90   Pulse: 66   Weight: 259 lb 6.4 oz (117.7 kg)   Height: 5' 8.5\" (1.74 m)    Body mass index is 38.87 kg/(m^2).    General appearance - alert, well appearing, and in no distress  Mental status - affect appropriate to mood  Eyes - sclera anicteric, moist mucous membranes  Neck - supple, no significant adenopathy  Lymphatics - no  lymphadenopathy  Chest - clear to auscultation, no wheezes, rales or rhonchi  Heart - normal rate, regular rhythm, normal S1, S2, no murmurs, rubs, clicks or gallops  Abdomen - soft, nontender, nondistended, no masses or organomegaly  Back exam - full range of motion, no tenderness  Neurological - cranial nerves II through XII grossly intact, no focal deficit  Musculoskeletal - no muscular tenderness noted, normal strength  Extremities - peripheral pulses normal, no pedal edema  Skin - normal coloration  no rashes    Recent Labs:  No results found for: CHOL, CHOLX, CHLST, CHOLV, 584047, HDL, LDL, LDLC, DLDLP, TGLX, TRIGL, TRIGP, CHHD, CHHDX  Lab Results   Component Value Date/Time    Creatinine 1.07 (H) 10/24/2017 03:09 PM     Lab Results   Component Value Date/Time    BUN 27 (H) 10/24/2017 03:09 PM     Lab Results   Component Value Date/Time    Potassium 4.5 10/24/2017 03:09 PM     No results found for: HBA1C, HGBE8, GJU3NVCX, IFG4PPSP  Lab Results   Component Value Date/Time    HGB 12.9 10/24/2017 03:09 PM     Lab Results   Component Value Date/Time    PLATELET 074 19/04/9077 03:09 PM       Reviewed:  Past Medical History:   Diagnosis Date    Kidney stones     Melanoma ([address]     Migraine     Rheumatoid arthritis(714.0)     Thyroid disease      History   Smoking Status    Former Smoker   Smokeless Tobacco    Never Used     Comment: when she was 14     History   Alcohol Use    Yes     Comment: Rare     Allergies   Allergen Reactions    Bactrim [Sulfamethoprim] Hives    Ciprofloxacin Hives       Current Outpatient Prescriptions   Medication Sig    dilTIAZem CD (CARDIZEM CD) 120 mg ER capsule take 1 capsule by mouth NIGHTLY    calcium carbonate (OS-LIAM) 500 mg calcium (1,250 mg) tablet Take  by mouth daily.  DULoxetine (CYMBALTA) 60 mg capsule Take 60 mg by mouth daily.  traMADol (ULTRAM) 50 mg tablet Take 50 mg by mouth two (2) times daily as needed for Pain.  acetaminophen (TYLENOL) 650 mg CR tablet Take 1,300 mg by mouth every twelve (12) hours.  potassium citrate (UROCIT-K10) 10 mEq (1,080 mg) TbER 10 mEq two (2) times a day.  hydroxychloroquine (PLAQUENIL) 200 mg tablet 200 mg two (2) times a day.  ondansetron (ZOFRAN ODT) 4 mg disintegrating tablet Take 1 Tab by mouth every eight (8) hours as needed for Nausea.  aspirin delayed-release 81 mg tablet Take 81 mg by mouth daily.  b-complex with vitamin c (VITAMIN B COMPLEX WITH C) cap capsule Take 1 Cap by mouth daily.  folic acid (FOLVITE) 1 mg tablet Take 1 mg by mouth every evening.  methotrexate (RHEUMATREX) 2.5 mg tablet Take 20 mg by mouth every Monday.  levothyroxine (SYNTHROID) 125 mcg tablet Take  by mouth nightly.  multivitamin (ONE A DAY) tablet Take 1 Tab by mouth daily (with dinner). No current facility-administered medications for this visit.         Shirlene Vega MD  [facility] and Vascular [place]  [address] [facility] [address]  [address]

## 2018-03-12 NOTE — MR AVS SNAPSHOT
[address] 
[phone] Patient: Liana Bruno MRN: [id number]:[id number] Visit Information Date & Time Provider Department Dept. Phone Encounter #  
 3/12/2018  3:40 PM Malini Castillo MD [facility] Yany Franco [phone] [id number] Upcoming Health Maintenance Date Due Hepatitis C Screening 1960 HEMOGLOBIN A1C Q6M 1960 LIPID PANEL Q1 1960 FOOT EXAM Q1 7/10/1968 MICROALBUMIN Q1 7/10/1968 EYE EXAM RETINAL OR DILATED Q1 7/10/1968 Pneumococcal 19-64 Highest Risk (1 of 3 - PCV13) 7/10/1977 DTaP/Tdap/Td series (1 - Tdap) 7/10/1979 PAP AKA CERVICAL CYTOLOGY 7/10/1979 BREAST CANCER SCRN MAMMOGRAM 7/10/2008 FOBT Q 1 YEAR AGE 50-75 7/10/2008 Influenza Age 5 to Adult 8/1/2017 Allergies as of 3/12/2018  Review Complete On: 3/12/2018 By: Geraldo Green Severity Noted Reaction Type Reactions Bactrim [Sulfamethoprim]  11/28/2016    Hives Ciprofloxacin  11/28/2016    Hives Current Immunizations  Never Reviewed No immunizations on file. Not reviewed this visit You Were Diagnosed With   
  
 Codes Comments SVT [facility]raventricular tachycardia) ([facility]    -  Primary ICD-10-CM: I47.1 ICD-9-CM: 427.89 Vitals BP Pulse Height(growth percentile) Weight(growth percentile) BMI OB Status 140/90 (BP 1 Location: Left arm, BP Patient Position: Sitting) 66 5' 8.5\" (1.74 m) 259 lb 6.4 oz (117.7 kg) 38.87 kg/m2 Postmenopausal  
 Smoking Status Former Smoker Vitals History BMI and BSA Data Body Mass Index Body Surface Area  
 38.87 kg/m 2 2.39 m 2 Preferred Pharmacy Pharmacy Name Phone [facility], [address] [phone] Your Updated Medication List  
  
   
 This list is accurate as of 3/12/18  3:54 PM.  Always use your most recent med list.  
  
  
  
  
 acetaminophen 650 mg Tber Commonly known as:  TYLENOL Take 1,300 mg by mouth every twelve (12) hours. aspirin delayed-release 81 mg tablet Take 81 mg by mouth daily. calcium carbonate 500 mg calcium (1,250 mg) tablet Commonly known as:  OS-LIAM Take  by mouth daily. CYMBALTA 60 mg capsule Generic drug:  DULoxetine Take 60 mg by mouth daily. dilTIAZem  mg ER capsule Commonly known as:  CARDIZEM CD Take 1 Cap by mouth daily. folic acid 1 mg tablet Commonly known as:  Google Take 1 mg by mouth every evening. hydroxychloroquine 200 mg tablet Commonly known as:  PLAQUENIL  
200 mg two (2) times a day. levothyroxine 125 mcg tablet Commonly known as:  SYNTHROID Take  by mouth nightly. methotrexate 2.5 mg tablet Commonly known as:  Renaee  Take 20 mg by mouth every Monday. multivitamin tablet Commonly known as:  ONE A DAY Take 1 Tab by mouth daily (with dinner). ondansetron 4 mg disintegrating tablet Commonly known as:  ZOFRAN ODT Take 1 Tab by mouth every eight (8) hours as needed for Nausea. potassium citrate 10 mEq (1,080 mg) Macie Perfect Commonly known as:  UROCIT-K10  
10 mEq two (2) times a day. traMADol 50 mg tablet Commonly known as:  ULTRAM  
Take 50 mg by mouth two (2) times daily as needed for Pain. VITAMIN B COMPLEX WITH C Cap capsule Generic drug:  b-complex with vitamin c Take 1 Cap by mouth daily. Prescriptions Sent to Pharmacy Refills  
 dilTIAZem CD (CARDIZEM CD) 180 mg ER capsule 3 Sig: Take 1 Cap by mouth daily. Class: Normal  
 Pharmacy: [id number] [address] [place] [address] [address], [address] Ph #: [phone] Route: Oral  
  
Introducing [facility]! Dear Katlin Parrish: Thank you for requesting a [employer] account. Our records indicate that you have previously registered for a [employer] account but its currently inactive. Please call our [employer] support line at [phone]. Additional Information If you have questions, please visit the Frequently Asked Questions section of the [employer] website at [email or web address] Remember, [employer] is NOT to be used for urgent needs. For medical emergencies, dial 911. Now available from your iPhone and Android! Please provide this summary of care documentation to your next provider. Your primary care clinician is listed as Cary Jean-Baptiste. If you have any questions after today's visit, please call [phone].

## 2018-04-23 ENCOUNTER — TELEPHONE (OUTPATIENT)
Dept: CARDIOLOGY CLINIC | Age: 58
End: 2018-04-23

## 2018-04-23 NOTE — TELEPHONE ENCOUNTER
[phone] until 3pm,,,(after 3pm [phone])Pt called to report she was AFIB before 6am.  Pt expressed she is currently no longer is afib, it has subsided but she wanted to make Dr. Sánchez Alfonso aware.   Thanks

## 2018-04-23 NOTE — TELEPHONE ENCOUNTER
Please thank her for letting us know and ask how she is tolerating the higher dose of diltiazem. Does she feel like episodes are less frequent on the higher dose of diltiazem?

## 2018-04-24 NOTE — TELEPHONE ENCOUNTER
Returned patient's call, 2 pt identifiers used  Patient states she has been tolerating higher dose of Diltiazem, she has had very few very small episodes and overall has no complaints. She will call with any changes.

## 2018-08-14 ENCOUNTER — HOSPITAL ENCOUNTER (OUTPATIENT)
Dept: GENERAL RADIOLOGY | Age: 58
Discharge: HOME | End: 2018-08-14
Payer: COMMERCIAL

## 2018-08-14 DIAGNOSIS — M15.9 GENERALIZED OSTEOARTHROSIS, INVOLVING MULTIPLE SITES: ICD-10-CM

## 2018-08-14 DIAGNOSIS — M05.9 SEROPOSITIVE RHEUMATOID ARTHRITIS (HCC): ICD-10-CM

## 2018-08-14 PROCEDURE — 73030 X-RAY EXAM OF SHOULDER: CPT

## 2019-02-20 ENCOUNTER — OFFICE VISIT (OUTPATIENT)
Dept: CARDIOLOGY CLINIC | Age: 59
End: 2019-02-20

## 2019-02-20 VITALS
DIASTOLIC BLOOD PRESSURE: 80 MMHG | WEIGHT: 253.6 LBS | HEIGHT: 69 IN | HEART RATE: 72 BPM | BODY MASS INDEX: 37.56 KG/M2 | RESPIRATION RATE: 16 BRPM | SYSTOLIC BLOOD PRESSURE: 140 MMHG

## 2019-02-20 DIAGNOSIS — I48.92 ATRIAL FLUTTER, UNSPECIFIED TYPE (HCC): ICD-10-CM

## 2019-02-20 DIAGNOSIS — I47.1 SVT (SUPRAVENTRICULAR TACHYCARDIA) (HCC): Primary | ICD-10-CM

## 2019-02-20 DIAGNOSIS — M05.79 RHEUMATOID ARTHRITIS INVOLVING MULTIPLE SITES WITH POSITIVE RHEUMATOID FACTOR (HCC): ICD-10-CM

## 2019-02-20 DIAGNOSIS — E66.01 SEVERE OBESITY (HCC): ICD-10-CM

## 2019-02-20 DIAGNOSIS — E03.9 ACQUIRED HYPOTHYROIDISM: ICD-10-CM

## 2019-02-20 DIAGNOSIS — E11.21 TYPE 2 DIABETES WITH NEPHROPATHY (HCC): ICD-10-CM

## 2019-02-20 DIAGNOSIS — R00.2 PALPITATIONS: ICD-10-CM

## 2019-02-20 DIAGNOSIS — M06.9 RHEUMATOID ARTHRITIS, INVOLVING UNSPECIFIED SITE, UNSPECIFIED RHEUMATOID FACTOR PRESENCE: ICD-10-CM

## 2019-02-20 DIAGNOSIS — I49.9 IRREGULAR HEART BEAT: ICD-10-CM

## 2019-02-20 RX ORDER — DILTIAZEM HYDROCHLORIDE 180 MG/1
180 CAPSULE, COATED, EXTENDED RELEASE ORAL DAILY
Qty: 90 CAP | Refills: 3 | Status: SHIPPED | OUTPATIENT
Start: 2019-02-20 | End: 2019-05-18 | Stop reason: SDUPTHER

## 2019-02-20 NOTE — PROGRESS NOTES
[facility] Humphrey Pierce  [phone]    HPI: Jackie Leal, a 59y.o. year-old who presents for follow up regarding her palpitations, SVT vs. AFL    She is now on diltiazem CD and has only had 3 episodes since her last visit  She uses ice and does a vagal maneuver and her rhythm settles down  She has chronic dizziness with position changes  Says she drinks a lot of water  No syncope  Does not drink caffeine - drinks caffeine free coke and tea  No chest pain   No dyspnea with exertion   Does not check BP at home - BP slightly elevated today   Has not seen PCP in a while now, has not had labs in over a year   Not exercising regularly, says she has an active job as  at [employer]  Today again discussed afib/flutter/svt and cva risk. She is a oxlsf5cxks=5 and will continue on aspirin 81mg only for now. Assessment/Plan:  1. Aflutter vs SVT, [id number]=3(female)  -cont aspirin, PYQLY0DYFe=6, discussed CVA risk   -continue diltiazem CD 180mg daily   2. Chest pain- hx neg cardiac cath   3. Body mass index is 38 kg/m². Homero Daily Needs to work on diet and exercise   4. Diabetes-borderline, needs to work on diet and exercise    5. Sedentary lifestyle - advised her to begin regular exercise   6. RA- discussed the risks of CAD associated with RA in the past, now on Enbrel injections, followed by Dr Aguilar/rheumatology   7. Hypothyroidism - on levothyroxine, no labs in 1 year     Soc no tob no etoh   Fhx + for CAD in her father who had CABG at 43    Echo 11/17 - LVEF 55-60%, no WMA  Cath normal 2014    She  has a past medical history of Kidney stones, Melanoma ([address] Migraine, Rheumatoid arthritis(714.0), and Thyroid disease. Cardiovascular ROS: no chest pain or dyspnea on exertion  Respiratory ROS: no cough, shortness of breath, or wheezing  Neurological ROS: no TIA or stroke symptoms  All other systems negative except as above.      PE  Vitals:    02/20/19 1405   BP: 140/80   Pulse: 72   Resp: 16   Weight: 253 lb 9.6 oz (115 kg)   Height: 5' 8.5\" (1.74 m)    Body mass index is 38 kg/m².    General appearance - alert, well appearing, and in no distress  Mental status - affect appropriate to mood  Eyes - sclera anicteric, moist mucous membranes  Neck - supple, no carotid bruits   Lymphatics - no  lymphadenopathy  Chest - clear to auscultation, no wheezes, rales or rhonchi  Heart - normal rate, regular rhythm, normal S1, S2, no murmurs, rubs, clicks or gallops  Abdomen - soft, nontender, nondistended  Back exam - full range of motion, no tenderness  Neurological - cranial nerves II through XII grossly intact, no focal deficit  Musculoskeletal - no muscular tenderness noted, normal strength  Extremities - peripheral pulses normal, no pedal edema  Skin - normal coloration  no rashes    Recent Labs:  Lab Results   Component Value Date/Time    Cholesterol, total 170 02/20/2019 03:23 PM    HDL Cholesterol 65 02/20/2019 03:23 PM    LDL, calculated 81 02/20/2019 03:23 PM    Triglyceride 118 02/20/2019 03:23 PM     Lab Results   Component Value Date/Time    Creatinine 0.78 02/20/2019 03:22 PM     Lab Results   Component Value Date/Time    BUN 23 02/20/2019 03:22 PM     Lab Results   Component Value Date/Time    Potassium 4.2 02/20/2019 03:22 PM     No results found for: HBA1C, HGBE8, GHG5DYSC, LAV9GJKJ  Lab Results   Component Value Date/Time    HGB 12.9 10/24/2017 03:09 PM     Lab Results   Component Value Date/Time    PLATELET 506 54/45/4137 03:09 PM       Reviewed:  Past Medical History:   Diagnosis Date    Kidney stones     [facility]     Migraine     Rheumatoid arthritis(714.0)     Thyroid disease      Social History     Tobacco Use   Smoking Status Former Smoker   Smokeless Tobacco Never Used   Tobacco Comment    when she was 14     Social History     Substance and Sexual Activity   Alcohol Use Yes    Frequency: Monthly or less    Drinks per session: 1 or 2    Binge frequency: Never Comment: Rare     Allergies   Allergen Reactions    Bactrim [Sulfamethoprim] Hives    Ciprofloxacin Hives       Current Outpatient Medications   Medication Sig    etanercept (ENBREL SURECLICK) 50 mg/mL (2.28 mL) injection 50 mg by SubCUTAneous route every seven (7) days.  dilTIAZem CD (CARDIZEM CD) 180 mg ER capsule Take 1 Cap by mouth daily.  calcium carbonate (OS-LIAM) 500 mg calcium (1,250 mg) tablet Take  by mouth daily.  DULoxetine (CYMBALTA) 60 mg capsule Take 60 mg by mouth daily.  traMADol (ULTRAM) 50 mg tablet Take 50 mg by mouth two (2) times daily as needed for Pain.  acetaminophen (TYLENOL) 650 mg CR tablet Take 1,300 mg by mouth every twelve (12) hours.  ondansetron (ZOFRAN ODT) 4 mg disintegrating tablet Take 1 Tab by mouth every eight (8) hours as needed for Nausea.  aspirin delayed-release 81 mg tablet Take 81 mg by mouth daily.  b-complex with vitamin c (VITAMIN B COMPLEX WITH C) cap capsule Take 1 Cap by mouth daily.  folic acid (FOLVITE) 1 mg tablet Take 1 mg by mouth every evening.  methotrexate (RHEUMATREX) 2.5 mg tablet Take 20 mg by mouth every Monday.  levothyroxine (SYNTHROID) 125 mcg tablet Take  by mouth nightly.  multivitamin (ONE A DAY) tablet Take 1 Tab by mouth daily (with dinner). No current facility-administered medications for this visit.         Uzma Payne MD  [facility] and V[facility] [place]  [address] [facility] [address]  [address]

## 2019-02-21 LAB
ALBUMIN SERPL-MCNC: 4.3 G/DL (ref 3.6–4.8)
ALBUMIN/GLOB SERPL: 1.6 {RATIO} (ref 1.2–2.2)
ALP SERPL-CCNC: 88 IU/L (ref 39–117)
ALT SERPL-CCNC: 17 IU/L (ref 0–32)
AST SERPL-CCNC: 16 IU/L (ref 0–40)
BILIRUB SERPL-MCNC: 0.2 MG/DL (ref 0–1.2)
BUN SERPL-MCNC: 23 MG/DL (ref 8–27)
BUN/CREAT SERPL: 29 (ref 12–28)
CALCIUM SERPL-MCNC: 9 MG/DL (ref 8.7–10.3)
CHLORIDE SERPL-SCNC: 106 MMOL/L (ref 96–106)
CHOLEST SERPL-MCNC: 170 MG/DL (ref 100–199)
CO2 SERPL-SCNC: 25 MMOL/L (ref 20–29)
CREAT SERPL-MCNC: 0.78 MG/DL (ref 0.57–1)
GLOBULIN SER CALC-MCNC: 2.7 G/DL (ref 1.5–4.5)
GLUCOSE SERPL-MCNC: 91 MG/DL (ref 65–99)
HDLC SERPL-MCNC: 65 MG/DL
INTERPRETATION, 910389: NORMAL
LDLC SERPL CALC-MCNC: 81 MG/DL (ref 0–99)
MAGNESIUM SERPL-MCNC: 2.1 MG/DL (ref 1.6–2.3)
POTASSIUM SERPL-SCNC: 4.2 MMOL/L (ref 3.5–5.2)
PROT SERPL-MCNC: 7 G/DL (ref 6–8.5)
SODIUM SERPL-SCNC: 142 MMOL/L (ref 134–144)
T4 SERPL-MCNC: 7 UG/DL (ref 4.5–12)
TRIGL SERPL-MCNC: 118 MG/DL (ref 0–149)
TSH SERPL DL<=0.005 MIU/L-ACNC: 0.89 UIU/ML (ref 0.45–4.5)
VLDLC SERPL CALC-MCNC: 24 MG/DL (ref 5–40)

## 2019-05-17 ENCOUNTER — HOSPITAL ENCOUNTER (EMERGENCY)
Age: 59
Discharge: HOME | End: 2019-05-17
Attending: EMERGENCY MEDICINE
Payer: COMMERCIAL

## 2019-05-17 ENCOUNTER — APPOINTMENT (OUTPATIENT)
Dept: GENERAL RADIOLOGY | Age: 59
End: 2019-05-17
Attending: EMERGENCY MEDICINE
Payer: COMMERCIAL

## 2019-05-17 VITALS
HEART RATE: 76 BPM | TEMPERATURE: 98.1 F | RESPIRATION RATE: 18 BRPM | DIASTOLIC BLOOD PRESSURE: 83 MMHG | SYSTOLIC BLOOD PRESSURE: 139 MMHG | OXYGEN SATURATION: 100 %

## 2019-05-17 DIAGNOSIS — R07.9 ACUTE CHEST PAIN: Primary | ICD-10-CM

## 2019-05-17 LAB
ALBUMIN SERPL-MCNC: 3.8 G/DL (ref 3.5–5)
ALBUMIN/GLOB SERPL: 1.1 {RATIO} (ref 1.1–2.2)
ALP SERPL-CCNC: 88 U/L (ref 45–117)
ALT SERPL-CCNC: 31 U/L (ref 12–78)
ANION GAP SERPL CALC-SCNC: 10 MMOL/L (ref 5–15)
AST SERPL-CCNC: 24 U/L (ref 15–37)
ATRIAL RATE: 71 BPM
BASOPHILS # BLD: 0 K/UL (ref 0–0.1)
BASOPHILS NFR BLD: 1 % (ref 0–1)
BILIRUB SERPL-MCNC: 0.4 MG/DL (ref 0.2–1)
BUN SERPL-MCNC: 28 MG/DL (ref 6–20)
BUN/CREAT SERPL: 35 (ref 12–20)
CALCIUM SERPL-MCNC: 9.3 MG/DL (ref 8.5–10.1)
CALCULATED P AXIS, ECG09: 65 DEGREES
CALCULATED R AXIS, ECG10: 7 DEGREES
CALCULATED T AXIS, ECG11: 43 DEGREES
CHLORIDE SERPL-SCNC: 108 MMOL/L (ref 97–108)
CK SERPL-CCNC: 57 U/L (ref 26–192)
CO2 SERPL-SCNC: 25 MMOL/L (ref 21–32)
CREAT SERPL-MCNC: 0.81 MG/DL (ref 0.55–1.02)
DIAGNOSIS, 93000: NORMAL
DIFFERENTIAL METHOD BLD: NORMAL
EOSINOPHIL # BLD: 0.3 K/UL (ref 0–0.4)
EOSINOPHIL NFR BLD: 6 % (ref 0–7)
ERYTHROCYTE [DISTWIDTH] IN BLOOD BY AUTOMATED COUNT: 14 % (ref 11.5–14.5)
GLOBULIN SER CALC-MCNC: 3.5 G/DL (ref 2–4)
GLUCOSE SERPL-MCNC: 98 MG/DL (ref 65–100)
HCT VFR BLD AUTO: 39.2 % (ref 35–47)
HGB BLD-MCNC: 12.3 G/DL (ref 11.5–16)
IMM GRANULOCYTES # BLD AUTO: 0 K/UL (ref 0–0.04)
IMM GRANULOCYTES NFR BLD AUTO: 0 % (ref 0–0.5)
LYMPHOCYTES # BLD: 1.5 K/UL (ref 0.8–3.5)
LYMPHOCYTES NFR BLD: 35 % (ref 12–49)
MCH RBC QN AUTO: 28.6 PG (ref 26–34)
MCHC RBC AUTO-ENTMCNC: 31.4 G/DL (ref 30–36.5)
MCV RBC AUTO: 91.2 FL (ref 80–99)
MONOCYTES # BLD: 0.5 K/UL (ref 0–1)
MONOCYTES NFR BLD: 11 % (ref 5–13)
NEUTS SEG # BLD: 2 K/UL (ref 1.8–8)
NEUTS SEG NFR BLD: 47 % (ref 32–75)
NRBC # BLD: 0 K/UL (ref 0–0.01)
NRBC BLD-RTO: 0 PER 100 WBC
P-R INTERVAL, ECG05: 128 MS
PLATELET # BLD AUTO: 271 K/UL (ref 150–400)
PMV BLD AUTO: 9 FL (ref 8.9–12.9)
POTASSIUM SERPL-SCNC: 4 MMOL/L (ref 3.5–5.1)
PROT SERPL-MCNC: 7.3 G/DL (ref 6.4–8.2)
Q-T INTERVAL, ECG07: 422 MS
QRS DURATION, ECG06: 90 MS
QTC CALCULATION (BEZET), ECG08: 458 MS
RBC # BLD AUTO: 4.3 M/UL (ref 3.8–5.2)
SODIUM SERPL-SCNC: 143 MMOL/L (ref 136–145)
TROPONIN I SERPL-MCNC: <0.05 NG/ML
TROPONIN I SERPL-MCNC: <0.05 NG/ML
VENTRICULAR RATE, ECG03: 71 BPM
WBC # BLD AUTO: 4.3 K/UL (ref 3.6–11)

## 2019-05-17 PROCEDURE — 85025 COMPLETE CBC W/AUTO DIFF WBC: CPT

## 2019-05-17 PROCEDURE — 93005 ELECTROCARDIOGRAM TRACING: CPT

## 2019-05-17 PROCEDURE — 99285 EMERGENCY DEPT VISIT HI MDM: CPT

## 2019-05-17 PROCEDURE — 74011250637 HC RX REV CODE- 250/637: Performed by: NURSE PRACTITIONER

## 2019-05-17 PROCEDURE — 80053 COMPREHEN METABOLIC PANEL: CPT

## 2019-05-17 PROCEDURE — 84484 ASSAY OF TROPONIN QUANT: CPT

## 2019-05-17 PROCEDURE — 71046 X-RAY EXAM CHEST 2 VIEWS: CPT

## 2019-05-17 PROCEDURE — 36415 COLL VENOUS BLD VENIPUNCTURE: CPT

## 2019-05-17 PROCEDURE — 82550 ASSAY OF CK (CPK): CPT

## 2019-05-17 RX ORDER — PANTOPRAZOLE SODIUM 40 MG/1
40 TABLET, DELAYED RELEASE ORAL DAILY
Qty: 20 TAB | Refills: 0 | Status: SHIPPED | OUTPATIENT
Start: 2019-05-17 | End: 2019-06-06

## 2019-05-17 RX ORDER — PANTOPRAZOLE SODIUM 40 MG/1
40 TABLET, DELAYED RELEASE ORAL
Status: DISCONTINUED | OUTPATIENT
Start: 2019-05-17 | End: 2019-05-17 | Stop reason: HOSPADM

## 2019-05-17 RX ORDER — MELOXICAM 15 MG/1
15 TABLET ORAL DAILY
COMMUNITY
End: 2019-11-07

## 2019-05-17 RX ADMIN — PANTOPRAZOLE SODIUM 40 MG: 40 TABLET, DELAYED RELEASE ORAL at 12:13

## 2019-05-17 NOTE — ED TRIAGE NOTES
TRIAGE:Pt arrives via EMS from home with c/o 2 episodes of squeezing chest pain that lasted 30 min and started at around 0830. Then chest pain subsided. Got 324mg ASA. Pain radiated to back and arm, +lightheadedness

## 2019-05-17 NOTE — PROGRESS NOTES
Admission Medication Reconciliation: 
Information obtained from: Chart, RX Query, patients Significant PMH/Disease States:  
Past Medical History:  
Diagnosis Date Atrial fibrillation ([facility] Kidney stones Melanoma ([facility] Migraine Rheumatoid arthritis(714.0) SVT (supraventricular tachycardia) ([facility] Thyroid disease Chief Complaint for this Admission:  Chest pain Allergies:  Bactrim [sulfamethoprim] and Ciprofloxacin Prior to Admission Medications:  
Prior to Admission Medications Prescriptions Last Dose Informant Patient Reported? Taking? DULoxetine (CYMBALTA) 60 mg capsule 2019 at Unknown time  Yes Yes Sig: Take 60 mg by mouth daily. acetaminophen (TYLENOL) 650 mg CR tablet 2019 at Unknown time  Yes Yes Sig: Take 1,300 mg by mouth every twelve (12) hours. aspirin delayed-release 81 mg tablet 2019 at Unknown time  Yes Yes Sig: Take 81 mg by mouth daily. b-complex with vitamin c (VITAMIN B COMPLEX WITH C) cap capsule 2019 at Unknown time  Yes Yes Sig: Take 1 Cap by mouth daily. calcium carbonate (OS-LIAM) 500 mg calcium (1,250 mg) tablet 2019 at Unknown time  Yes Yes Sig: Take  by mouth daily. dilTIAZem CD (CARDIZEM CD) 180 mg ER capsule 2019 at Unknown time  No Yes Sig: Take 1 Cap by mouth daily. etanercept (ENBREL SURECLICK) 50 mg/mL (0.40 mL) injection 2019  Yes No  
Si mg by SubCUTAneous route every seven (7) days. folic acid (FOLVITE) 1 mg tablet 2019 at Unknown time  Yes Yes Sig: Take 1 mg by mouth every evening. levothyroxine (SYNTHROID) 125 mcg tablet 2019 at Unknown time  Yes Yes Sig: Take  by mouth nightly. meloxicam (MOBIC) 15 mg tablet 2019 at Unknown time  Yes Yes Sig: Take 15 mg by mouth daily. methotrexate (RHEUMATREX) 2.5 mg tablet 2019  Yes No  
Sig: Take 20 mg by mouth every Monday. multivitamin (ONE A DAY) tablet 2019 at Unknown time  Yes Yes Sig: Take 1 Tab by mouth daily (with dinner). Facility-Administered Medications: None Comments/Recommendations: Patient is an excellent historian. Notes: MTX: takes 8 tab every Monday ASA: uses for vascular migraines Deleted: Ondansetron Tramadol Thank you for allowing me to participate in the care of your patient. Skye AndradeD, RN #[id number]

## 2019-05-17 NOTE — ADVANCED PRACTICE NURSE
Pt seen by Dr. Maye Brunson. Low suspician for CAD- sounds more like GI/esophogeal spasm- had NL cath in 2014. Recommend check another troponin now and if negative, ok to discharge on PPI and follow up with Dr. Omer Bobo in a week.

## 2019-05-17 NOTE — ED PROVIDER NOTES
59 y.o. female with past medical history significant for migraines, RA, AFib, and melanoma who presents from work via EMS with chief complaint of chest pain. Patient follows with cardiology for SVT vs. atrial flutter, on diltiazem and a baby ASA. Patient states she has been in her normal state of health, and was feeling fine this morning. At 0830, while at work, she started with sudden onset \"10/10\" midsternal chest pain radiating to her left shoulder and back, described as \"squeezing\". Patient reports accompanying nausea, SOB, and lightheadedness. Patient states her pain lasted 30 minutes before resolving. However, she then had a second episode of pain shortly after, lasting 15 minutes. 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 On arrival to the ED, patient is without pain but her lightheadedness persists. Patient reports a h/o similar pains, at which time she had a clean cardiac catheterization done. Patient denies known h/o MI. Patient denies any vomiting, palpitations, or leg swelling. There are no other acute medical concerns at this time. Social hx: Former smoker; Rare EtOH use PCP: Delphine Felty, MD 
Cardiologist: Johanny Figueredo MD 
 
Note written by Osmin Nelson, as dictated by Vilma Silva MD 10:13 AM 
 
The history is provided by the patient and medical records. No  was used. Past Medical History:  
Diagnosis Date  Atrial fibrillation ([address]  Kidney stones  Melanoma ([address]  Migraine  Rheumatoid arthritis(714.0)  SVT (supraventricular tachycardia) (HCC)  Thyroid disease Past Surgical History:  
Procedure Laterality Date  HX BACK SURGERY    
 HX  SECTION  3289, 82[address] [place] [address]  HX HERNIA REPAIR    
 HX MALIGNANT SKIN LESION EXCISION Family History:  
Problem Relation Age of Onset  Stroke Mother  Stroke Father  Heart Disease Father Social History Socioeconomic History  Marital status:  Spouse name: Not on file  Number of children: Not on file  Years of education: Not on file  Highest education level: Not on file Occupational History  Not on file Social Needs  Financial resource strain: Not on file  Food insecurity:  
  Worry: Not on file Inability: Not on file  Transportation needs:  
  Medical: Not on file Non-medical: Not on file Tobacco Use  Smoking status: Former Smoker  Smokeless tobacco: Never Used  Tobacco comment: when she was 10 Substance and Sexual Activity  Alcohol use: Not Currently Frequency: Monthly or less Drinks per session: 1 or 2 Binge frequency: Never Comment: Rare  Drug use: No  
 Sexual activity: Not on file Lifestyle  Physical activity:  
  Days per week: Not on file Minutes per session: Not on file  Stress: Not on file Relationships  Social connections:  
  Talks on phone: Not on file Gets together: Not on file Attends Taoist service: Not on file Active member of club or organization: Not on file Attends meetings of clubs or organizations: Not on file Relationship status: Not on file  Intimate partner violence:  
  Fear of current or ex partner: Not on file Emotionally abused: Not on file Physically abused: Not on file Forced sexual activity: Not on file Other Topics Concern  Not on file Social History Narrative  Not on file ALLERGIES: Bactrim [sulfamethoprim] and Ciprofloxacin Review of Systems Constitutional: Negative for chills and fever. HENT: Negative for rhinorrhea and sore throat. Respiratory: Positive for shortness of breath. Negative for cough. Cardiovascular: Positive for chest pain. Negative for palpitations and leg swelling. Gastrointestinal: Positive for nausea.  Negative for abdominal pain, diarrhea and vomiting. Genitourinary: Negative for dysuria and urgency. Musculoskeletal: Positive for arthralgias and back pain. Negative for neck pain. Skin: Negative for rash. Neurological: Positive for light-headedness. Negative for dizziness and weakness. All other systems reviewed and are negative. Vitals:  
 05/17/19 1016 BP: 163/90 Pulse: 71 Resp: 16 Temp: 98.1 °F (36.7 °C) SpO2: 96% Physical Exam  
Vital signs reviewed. Nursing notes reviewed. Const:  No acute distress, well developed, well nourished Head:  Atraumatic, normocephalic Eyes:  PERRL, conjunctiva normal, no scleral icterus Neck:  Supple, trachea midline Cardiovascular:  RRR, no murmurs, no gallops, no rubs Resp:  No resp distress, no increased work of breathing, no wheezes, no rhonchi, no rales, Abd:  Soft, non-tender, non-distended, no rebound, no guarding, no CVA tenderness MSK:  No pedal edema, normal ROM Neuro:  Alert and oriented x3, no cranial nerve defect Skin:  Warm, dry, intact Psych: normal mood and affect, behavior is normal, judgement and thought content is normal.  
 
Note written by Osmin Curiel, as dictated by Branden Francois MD 10:13 AM 
 
MDM Number of Diagnoses or Management Options Acute chest pain:  
  
Amount and/or Complexity of Data Reviewed Clinical lab tests: ordered and reviewed Tests in the radiology section of CPT®: ordered and reviewed Review and summarize past medical records: yes Patient Progress Patient progress: stable Pt. Presents to the ER with chest pain. Pt. Has been pain free and symptom free in the ER. Negative troponins x2. Pt. Seen by cardiology who recommended starting her on protonix and f/u with cardiology or return to the ER with worsening sx. Procedures 11:20 AM 
Patient's lab work and CXR is completely unremarkable. Will consult cardiology. 11:48 AM 
Farzad Iverson NP, Cardiology, here to see the patient. 11:49 AM 
Dr. Carl Lee also here. 1:10 PM 
Cardiology recommends a second troponin and if negative, the patient can be discharged home. 1:19 PM 
Repeat troponin negative. Discharging home as recommended by cardiology.

## 2019-05-17 NOTE — ED NOTES
Discharge instructions given to patient by MD and RN. Pt has been given counseling on medication use and verbalizes understanding. IV D/C. Pt ambulated off of unit in no signs of distress.

## 2019-05-17 NOTE — CONSULTS
Cardiology Consult Note      Patient Name: Gilda Montoya  : 1960 MRN: [id number]  Date: 2019  Time: 12:03 PM    Admit Diagnosis: No admission diagnoses are documented for this encounter. Primary Cardiologist: Fatou Cabral MD  Consulting Cardiologist: Cyndra Sacks, M.D.    Reason for Consult: chest pain    Requesting MD: Dr. Joi Tran MD    HPI:  Gilda Montoya is a 59 y.o. female presented on 2019  For chest pain. Has PMH of aflutter vs SVT, DM, RA and osteoarthritis, borderline DM, hypothyroidism. Had normal cath in . 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 Described pain as \"gripping\" and located lower sternal area and epigastric area. Pain radiated to shoulders and back. Had associated dizziness, mild nausea and SOB. No diaphoresis, no palpitations. Denies syncope. Pain was not worse with ambulation. Took mylanta without relief or improvement in pain. First episode of pain lasted 30 minutes, second episode of pain lasted 15 minutes. Reports she had this same pain several years ago when she had GB disease but reports she has since had cholecystectomy. States she recently started riding a tricycle 30 minutes per day and has been doing so without chest pain or SOB. SH: no tob no etoh   FH: + for CAD in her father who had CABG at 43  Cardiac testing hx:  Echo  - LVEF 55-60%, no WMA  Cath normal     Subjective:  Currently denies chest pain and SOB, dizziness, N&V.     ASSESSMENT/PLAN:  1. Chest pain:    -Troponin <0.05 x 1, repeat now  -12 lead EKG:  NSR, no ischemic changes  -Chest pain resolved   -Had nl cath in .  -Sounds more like GI/esophogeal spasm. But will recheck troponin now. Start PPI  -Pt unable to walk on treadmill d/t arthritis.  Per  very low suspician for ACS, if second troponin negative, ok for discharge on PPI and follow up with Dr. Camron Ferrell next week (  Future Appointments   Date Time Provider Melita Richter   2019  1:00 PM Corwin Solis  [address]   2020  4:00 PM Corwin Solis  [address]     2. Hx of  Aflutter vs SVT, [id number](female)  -cont aspirin, [id number]=8,   -continue diltiazem CD 180mg daily     3. Diabetes-borderline, diet/exercise    4. Hx of RA , now on Enbrel injections, followed by Dr Aguilar/rheumatology   5. Hypothyroidism - on levothyroxine,    Pt seen by Dr. Tran Morejon. Highly doubt ACS- pain sounds like it was more GI related/esophogeal spasm. Start PPI. If second troponin negative, ok to discharge and follow up with Dr. Camron Ferrell as above. Review of Symptoms:  Constitutional: Negative for fever, chills,    HEENT: Negative for dysphagia   Respiratory: Negative for SOB  Cardiovascular: Positive for chest pain earlier today. Negative  palpitations, orthopnea, leg swelling, syncope, and PND. Gastrointestinal: Positive for nausea with episode, negative vomiting, diarrhea, blood in stool and melena,  Genitourinary: Negative for  hematuria. Musculoskeletal: Negative for myalgias  Skin: Negative for rash. Heme:no bleeding  Neurological: Negative for speech changes and focal weakness      Previous treatment/evaluation includes echocardiogram and cardiac catheterization . Cardiac risk factors: . DM, obesity, RA, FH.     Past Medical History:   Diagnosis Date    Atrial fibrillation ([address]     Kidney stones     [facility]     Migraine     Rheumatoid arthritis(714.0)     SVT (supraventricular tachycardia) (HCC)     Thyroid disease      Past Surgical History:   Procedure Laterality Date    HX BACK SURGERY      HX  SECTION  ,     HX CHOLECYSTECTOMY      HX HERNIA REPAIR      HX MALIGNANT SKIN LESION EXCISION       Current Facility-Administered Medications   Medication Dose Route Frequency    pantoprazole (PROTONIX) tablet 40 mg  40 mg Oral ACB     Current Outpatient Medications   Medication Sig    meloxicam (MOBIC) 15 mg tablet Take 15 mg by mouth daily.  dilTIAZem CD (CARDIZEM CD) 180 mg ER capsule Take 1 Cap by mouth daily.  calcium carbonate (OS-LIAM) 500 mg calcium (1,250 mg) tablet Take  by mouth daily.  DULoxetine (CYMBALTA) 60 mg capsule Take 60 mg by mouth daily.  acetaminophen (TYLENOL) 650 mg CR tablet Take 1,300 mg by mouth every twelve (12) hours.  aspirin delayed-release 81 mg tablet Take 81 mg by mouth daily.  b-complex with vitamin c (VITAMIN B COMPLEX WITH C) cap capsule Take 1 Cap by mouth daily.  folic acid (FOLVITE) 1 mg tablet Take 1 mg by mouth every evening.  levothyroxine (SYNTHROID) 125 mcg tablet Take  by mouth nightly.  multivitamin (ONE A DAY) tablet Take 1 Tab by mouth daily (with dinner).  etanercept (ENBREL SURECLICK) 50 mg/mL (7.44 mL) injection 50 mg by SubCUTAneous route every seven (7) days.  methotrexate (RHEUMATREX) 2.5 mg tablet Take 20 mg by mouth every Monday.        Allergies   Allergen Reactions    Bactrim [Sulfamethoprim] Hives    Ciprofloxacin Hives      Family History   Problem Relation Age of Onset    Stroke Mother     Stroke Father     Heart Disease Father       Social History     Socioeconomic History    Marital status:      Spouse name: Not on file    Number of children: Not on file    Years of education: Not on file    Highest education level: Not on file   Tobacco Use    Smoking status: Former Smoker    Smokeless tobacco: Never Used    Tobacco comment: when she was 14   Substance and Sexual Activity    Alcohol use: Not Currently     Frequency: Monthly or less     Drinks per session: 1 or 2     Binge frequency: Never     Comment: Rare    Drug use: No       Objective:    Physical Exam    Vitals:   Vitals:    05/17/19 1016 05/17/19 1030 05/17/19 1045 05/17/19 1100   BP: 163/90 159/79 136/77 147/74   Pulse: 71 72 69 64   Resp: 16 10 15 10   Temp: 98.1 °F (36.7 °C)      SpO2: 96% 96% 100% 95%       General:    Alert, cooperative, no distress, appears stated age. Neck:   Supple, no JVD. Back:     Symmetric, . Lungs:     Clear to auscultation bilaterally. Heart[de-identified]    Regular rate and rhythm, S1, S2 normal, no murmur, click, rub or gallop. Abdomen:     Soft Bowel sounds present.  + epigastric mild ttp. .   Extremities:   Extremities normal, atraumatic, no cyanosis or edema. Vascular:   Pulses - 2+   Skin:   Skin color normal. No rashes or lesions   Neurologic:   CN II-XII grossly intact. Telemetry: NSR    ECG: NSR, no ischemic changes    Data Review:     Radiology:   XR Results (most recent):  Results from Hospital Encounter encounter on 05/17/19   XR CHEST PA LAT    Narrative Exam:  2 view chest    Indication: Chest pain today    Comparison to 10/24/2017. PA and lateral views demonstrate normal heart size. There is no acute process in  the lung fields. Degenerative changes are seen in the thoracic spine. Impression Impression: No acute process or change compared to the prior exam.             Recent Labs     05/17/19  1016   TROIQ <0.05     Recent Labs     05/17/19  1016      K 4.0      CO2 25   BUN 28*   CREA 0.81   GLU 98   CA 9.3     Recent Labs     05/17/19  1016   WBC 4.3   HGB 12.3   HCT 39.2        Recent Labs     05/17/19  1016   SGOT 24   AP 88     No results for input(s): CHOL, LDLC in the last 72 hours. No lab exists for component: TGL, HDLC,  HBA1C  No results for input(s): CRP, TSH, TSHEXT in the last 72 hours. No lab exists for component: ESR    Thank you very much for this referral. I appreciate the opportunity to participate in this patient's care. I will follow along with above stated plan. Maria Victoria Lowe. RICARDO Cardoso   Cardiology Attending:Patient seen and examined. I agree with NP assessment and plans.   Low suspicion for CAD given previous normal cath, unable to walk on treadmill, OK to release if second troponin normal, follow with Dr. Miguel Michael.     River Parkinson MD 5/17/2019 1:06 PM              [facility] [address] [place] [address] Rd[address] [facility], [address] [address]     [facility]     [phone]    Erica Pennington MD

## 2019-05-17 NOTE — ED NOTES
Bedside and Verbal shift change report given to Fanta Vasquez RN (oncoming nurse) by Maryjane Guillaume RN (offgoing nurse). Report included the following information SBAR, ED Summary, MAR and Recent Results.

## 2019-05-17 NOTE — DISCHARGE INSTRUCTIONS

## 2019-05-20 ENCOUNTER — TELEPHONE (OUTPATIENT)
Dept: CARDIOLOGY CLINIC | Age: 59
End: 2019-05-20

## 2019-05-20 NOTE — TELEPHONE ENCOUNTER
Pt called stating she won't be able to leave work today and is asking that she be seen today after 3pm or seen sooner than September 16th anytime after 3pm. She was scheduled for a hospital f/u for chest pains.   Phone #[phone]  Thanks

## 2019-05-20 NOTE — TELEPHONE ENCOUNTER
Returned patient's call, 2 pt identifiers used    Scheduled patient to f/u with NP Kenna Osler:    Future Appointments   Date Time Provider Melita Neelam   6/5/2019  3:00 PM Fox Cervantes  [address] 9/16/2019  3:20 PM Corwin Solis  [address] 2/24/2020  4:00 PM Corwin Solis  [address]

## 2019-06-05 ENCOUNTER — OFFICE VISIT (OUTPATIENT)
Dept: CARDIOLOGY CLINIC | Age: 59
End: 2019-06-05

## 2019-06-05 VITALS
DIASTOLIC BLOOD PRESSURE: 88 MMHG | HEIGHT: 69 IN | OXYGEN SATURATION: 97 % | SYSTOLIC BLOOD PRESSURE: 142 MMHG | HEART RATE: 72 BPM | BODY MASS INDEX: 37.86 KG/M2 | RESPIRATION RATE: 17 BRPM | WEIGHT: 255.6 LBS

## 2019-06-05 DIAGNOSIS — Z82.49 FAMILY HISTORY OF EARLY CAD: ICD-10-CM

## 2019-06-05 DIAGNOSIS — R07.9 CHEST PAIN, UNSPECIFIED TYPE: Primary | ICD-10-CM

## 2019-06-05 DIAGNOSIS — M05.79 RHEUMATOID ARTHRITIS INVOLVING MULTIPLE SITES WITH POSITIVE RHEUMATOID FACTOR (HCC): ICD-10-CM

## 2019-06-05 DIAGNOSIS — E55.9 VITAMIN D DEFICIENCY: ICD-10-CM

## 2019-06-05 DIAGNOSIS — I48.3 TYPICAL ATRIAL FLUTTER (HCC): ICD-10-CM

## 2019-06-05 DIAGNOSIS — R73.02 IMPAIRED GLUCOSE TOLERANCE: ICD-10-CM

## 2019-06-05 RX ORDER — DILTIAZEM HYDROCHLORIDE 240 MG/1
240 CAPSULE, COATED, EXTENDED RELEASE ORAL DAILY
Qty: 30 CAP | Refills: 5 | Status: SHIPPED | OUTPATIENT
Start: 2019-06-05 | End: 2019-09-16 | Stop reason: SDUPTHER

## 2019-06-05 RX ORDER — NITROGLYCERIN 0.4 MG/1
0.4 TABLET SUBLINGUAL
Status: DISCONTINUED | OUTPATIENT
Start: 2019-06-05 | End: 2019-11-07

## 2019-06-05 NOTE — PROGRESS NOTES
[facility] [place]:   [phone]    HPI: Kathrynn Merlin, a 59y.o. year-old who presents for follow up after an ER visit for chest pain. She went to the ER on 5/17/19 for chest pain   She was at work and developed midsternal chest pain with dyspnea  Says it felt like when she had her gallbladder attack but her gallbladder was removed years ago   Symptoms lasted about an hour  She denies any diaphoresis or nausea but she did feel lightheaded with it  She denies any palpitations at the time   She had another milder episode of chest pain at school about a week later  She reports feeling more tired recently but no increase in WEBER  No PND  She doesn't know if she snores since her  passed, says she may have snored mildly in the past but never had any apnea episodes  She has arthritis in her hips and has pain 24/7 so she is not exercising  Since March 4th she has had 3 episodes of what she assumes was AF/AFL  She does not drink caffeine/etoh  Says her stress level is moderate, she gets adequate sleep  She denies any dizziness or syncope  Only sees PCP when she is sick  Works as a  at an elementary school    Assessment/Plan:  1. Aflutter vs SVT: [id number](female), discussed CVA risk at her last visit but she prefers to take ASA only for now  -to prevent further episodes will increase diltiazem CD 240mg daily   2. Chest pain- no CAD by cardiac cath in 2014, will order nuclear stress test to assess for ischemia, given NTG SL tabs PRN chest pain and instructed on use, diltiazem CD increased as above  3. Body mass index is 38.3 kg/m². needs to work on diet/weight loss/exercise    4. IGT - will check A1C, work on diet/weight loss/exercise     5. Sedentary lifestyle - advised her to begin regular exercise after her stress test   6. RA- discussed the risks of CAD associated with RA, now on Enbrel injections, followed by Dr Aguilar/rheumatology   7.   Hypothyroidism - on levothyroxine, TFTs ok in   8. Family hx of early CAD - assess for ischemia as above, advised her to have coronary calcium scan to look for early plaque in the arteries of her heart if her stress test is negative for ischemia, LDL 81  9. Vitamin D deficiency - will check Vitamin D level     Soc no tob no etoh   Fhx + for CAD in her father who had CABG at 43    Echo  - LVEF 55-60%, no WMA  Cath normal     She  has a past medical history of Atrial fibrillation ([address] Kidney stones, Melanoma ([address] Migraine, Rheumatoid arthritis(714.0), SVT (supraventricular tachycardia) ([address] and Thyroid disease. Cardiovascular ROS: positive for palpitations and chest pain   Respiratory ROS: no cough or wheezing  Neurological ROS: no TIA or stroke symptoms  All other systems negative except as above. PE  Vitals:    19 1502   BP: 142/88   Pulse: 72   Resp: 17   SpO2: 97%   Weight: 255 lb 9.6 oz (115.9 kg)   Height: 5' 8.5\" (1.74 m)    Body mass index is 38.3 kg/m².    General appearance - alert, well appearing, and in no distress  Mental status - affect appropriate to mood  Eyes - sclera anicteric, moist mucous membranes  Neck - supple, no carotid bruits   Lymphatics - not assessed   Chest - clear to auscultation, no wheezes, rales or rhonchi  Heart - normal rate, regular rhythm, normal S1, S2, no murmurs, rubs, clicks or gallops  Abdomen - soft, nontender, nondistended  Back exam - full range of motion, no tenderness  Neurological - cranial nerves II through XII grossly intact, no focal deficit  Musculoskeletal - no muscular tenderness noted, normal strength  Extremities - peripheral pulses normal, no pedal edema  Skin - normal coloration  no rashes    12 lead EC19 - NSR     Recent Labs:  Lab Results   Component Value Date/Time    Cholesterol, total 170 2019 03:23 PM    HDL Cholesterol 65 2019 03:23 PM    LDL, calculated 81 2019 03:23 PM    Triglyceride 118 2019 03:23 PM     Lab Results Component Value Date/Time    Creatinine 0.81 05/17/2019 10:16 AM     Lab Results   Component Value Date/Time    BUN 28 (H) 05/17/2019 10:16 AM     Lab Results   Component Value Date/Time    Potassium 4.0 05/17/2019 10:16 AM     No results found for: HBA1C, HGBE8, KKX2VFBH, MOL7APCR  Lab Results   Component Value Date/Time    HGB 12.3 05/17/2019 10:16 AM     Lab Results   Component Value Date/Time    PLATELET 773 93/88/1872 10:16 AM       Reviewed:  Past Medical History:   Diagnosis Date    [facility]     Kidney stones     Melanoma ([facility]     Migraine     Rheumatoid arthritis(714.0)     [facility]     Thyroid disease      Social History     Tobacco Use   Smoking Status Former Smoker   Smokeless Tobacco Never Used   Tobacco Comment    when she was 14     Social History     Substance and Sexual Activity   Alcohol Use Not Currently    Frequency: Monthly or less    Drinks per session: 1 or 2    Binge frequency: Never    Comment: Rare     Allergies   Allergen Reactions    Bactrim [Sulfamethoprim] Hives    Ciprofloxacin Hives       Current Outpatient Medications   Medication Sig    dilTIAZem CD (CARDIZEM CD) 240 mg ER capsule Take 1 Cap by mouth daily.  meloxicam (MOBIC) 15 mg tablet Take 15 mg by mouth daily.  pantoprazole (PROTONIX) 40 mg tablet Take 1 Tab by mouth daily for 20 days.  etanercept (ENBREL SURECLICK) 50 mg/mL (8.55 mL) injection 50 mg by SubCUTAneous route every seven (7) days.  calcium carbonate (OS-LIAM) 500 mg calcium (1,250 mg) tablet Take  by mouth daily.  DULoxetine (CYMBALTA) 60 mg capsule Take 60 mg by mouth daily.  acetaminophen (TYLENOL) 650 mg CR tablet Take 1,300 mg by mouth every twelve (12) hours.  aspirin delayed-release 81 mg tablet Take 81 mg by mouth daily.  b-complex with vitamin c (VITAMIN B COMPLEX WITH C) cap capsule Take 1 Cap by mouth daily.     folic acid (FOLVITE) 1 mg tablet Take 1 mg by mouth every evening.  methotrexate (RHEUMATREX) 2.5 mg tablet Take 20 mg by mouth every Monday.  levothyroxine (SYNTHROID) 125 mcg tablet Take  by mouth nightly.  multivitamin (ONE A DAY) tablet Take 1 Tab by mouth daily (with dinner).      Current Facility-Administered Medications   Medication Dose Route Frequency    nitroglycerin (NITROSTAT) tablet 0.4 mg  0.4 mg SubLINGual Q5MIN PRN       Bud Heard NP  [facility]  [address] [facility]  [address]

## 2019-06-05 NOTE — PATIENT INSTRUCTIONS
If your stress test is ok, please consider having a coronary calcium scan (heart scan) done to look for evidence of early plaque in the arteries of your heart. You should call [phone] to schedule this. This test is not covered by insurance and will cost you approximately $94-$917 this month. Please let the imaging center know that you are a patient of Dr. Martina Man so they can send her the results. You have been given a prescription for Nitroglycerin to take ONLY AS NEEDED for chest pain. You can take one tablet under your tongue for chest pain every 5 minutes up to a total of 3 tablets. If your chest pain is not gone after the 3rd tab, call 9-11 and go to the nearest emergency room. Please increase your Diltiazem CD to 240mg daily to help prevent episodes of Atrial Flutter    Please have labs drawn to check your A1C and vitamin D level     You will be scheduled for nuclear stress testing after your appointment today. Wear comfortable clothing (shorts or pants with a shirt or blouse- no underwire bras) and walking or athletic shoes. Do not eat or drink anything, except water, for at least 2 hours prior to your appointment. Avoid tobacco products for at least 6 hours prior to your test.    Do not eat or drink anything containing caffeine, including but not limited to the following: chocolate, regular and decaffeinated coffee, soft drinks, or tea for at least 24 hours prior to your test.    Do not hold your scheduled medications prior to your test.    Your test will be performed on a 2 day protocol. For a 2 day test, please allow for 2 hours in the office each day.

## 2019-06-05 NOTE — PROGRESS NOTES
Chief Complaint   Patient presents with    Irregular Heart Beat     follow-up    Chest Pain (Angina)     1. Have you been to the ER, urgent care clinic since your last visit? Hospitalized since your last visit? Yes When: 5/175/17/19 Where: [facility] Reason for visit: Chest pain    2. Have you seen or consulted any other health care providers outside of the [address] [place] [address] since your last visit? Include any pap smears or colon screening. No    3) Do you have an Advance Directive on file?  no

## 2019-06-06 ENCOUNTER — TELEPHONE (OUTPATIENT)
Dept: CARDIOLOGY CLINIC | Age: 59
End: 2019-06-06

## 2019-06-06 DIAGNOSIS — R07.9 CHEST PAIN, UNSPECIFIED TYPE: Primary | ICD-10-CM

## 2019-06-06 RX ORDER — NITROGLYCERIN 0.4 MG/1
0.4 TABLET SUBLINGUAL
Qty: 1 BOTTLE | Refills: 2 | Status: SHIPPED | OUTPATIENT
Start: 2019-06-06 | End: 2020-06-17

## 2019-06-06 NOTE — TELEPHONE ENCOUNTER
Requested Prescriptions     Signed Prescriptions Disp Refills    nitroglycerin (NITROSTAT) 0.4 mg SL tablet 1 Bottle 2     Si Tab by SubLINGual route every five (5) minutes as needed for Chest Pain. If chest pain not relieved after 3 doses, call 911 and go to ER. Authorizing Provider: Kristopher Narvaez     Ordering User: Ismael Edmonds     Verbal order per SARAH Red NP.

## 2019-06-06 NOTE — TELEPHONE ENCOUNTER
Patient stated that Nitroglycerin 0.4mg was supposed to be filled but it has not and I do not the option to request the order. Please advise.     Phone #: [phone]  Thanks

## 2019-06-08 LAB
25(OH)D3+25(OH)D2 SERPL-MCNC: 34.2 NG/ML (ref 30–100)
EST. AVERAGE GLUCOSE BLD GHB EST-MCNC: 117 MG/DL
HBA1C MFR BLD: 5.7 % (ref 4.8–5.6)

## 2019-06-17 ENCOUNTER — HOSPITAL ENCOUNTER (OUTPATIENT)
Dept: CT IMAGING | Age: 59
Discharge: HOME | End: 2019-06-17
Payer: SELF-PAY

## 2019-06-17 DIAGNOSIS — Z00.00 PREVENTATIVE HEALTH CARE: ICD-10-CM

## 2019-06-17 PROCEDURE — 75571 CT HRT W/O DYE W/CA TEST: CPT

## 2019-06-18 ENCOUNTER — TELEPHONE (OUTPATIENT)
Dept: CARDIAC REHAB | Age: 59
End: 2019-06-18

## 2019-06-18 NOTE — TELEPHONE ENCOUNTER
Reached patient's mobile voicemail (identified as her in the message).   Left a voice message with her coronary artery CT score of zero, a brief description of the meaning of this score, and my phone number in case of any questions

## 2019-07-02 ENCOUNTER — TELEPHONE (OUTPATIENT)
Dept: CARDIOLOGY CLINIC | Age: 59
End: 2019-07-02

## 2019-07-02 NOTE — TELEPHONE ENCOUNTER
----- Message from Arthur Glass NP sent at 7/2/2019 10:26 AM EDT -----  Stress test ok      Above nuclear stress test results given to patient.  2 pt identifiers used

## 2019-09-16 ENCOUNTER — OFFICE VISIT (OUTPATIENT)
Dept: CARDIOLOGY CLINIC | Age: 59
End: 2019-09-16

## 2019-09-16 VITALS
RESPIRATION RATE: 14 BRPM | HEART RATE: 82 BPM | OXYGEN SATURATION: 96 % | WEIGHT: 256 LBS | HEIGHT: 68 IN | SYSTOLIC BLOOD PRESSURE: 128 MMHG | DIASTOLIC BLOOD PRESSURE: 82 MMHG | BODY MASS INDEX: 38.8 KG/M2

## 2019-09-16 DIAGNOSIS — I47.1 SVT (SUPRAVENTRICULAR TACHYCARDIA) (HCC): ICD-10-CM

## 2019-09-16 DIAGNOSIS — E13.9 OTHER SPECIFIED DIABETES MELLITUS WITHOUT COMPLICATION, WITHOUT LONG-TERM CURRENT USE OF INSULIN (HCC): ICD-10-CM

## 2019-09-16 DIAGNOSIS — Z01.818 PRE-OP EXAMINATION: Primary | ICD-10-CM

## 2019-09-16 DIAGNOSIS — I48.3 TYPICAL ATRIAL FLUTTER (HCC): ICD-10-CM

## 2019-09-16 DIAGNOSIS — M05.79 RHEUMATOID ARTHRITIS INVOLVING MULTIPLE SITES WITH POSITIVE RHEUMATOID FACTOR (HCC): ICD-10-CM

## 2019-09-16 DIAGNOSIS — E66.01 SEVERE OBESITY (HCC): ICD-10-CM

## 2019-09-16 DIAGNOSIS — R07.9 CHEST PAIN, UNSPECIFIED TYPE: ICD-10-CM

## 2019-09-16 DIAGNOSIS — R00.2 PALPITATIONS: ICD-10-CM

## 2019-09-16 RX ORDER — DILTIAZEM HYDROCHLORIDE 240 MG/1
240 CAPSULE, COATED, EXTENDED RELEASE ORAL DAILY
Qty: 90 CAP | Refills: 3 | Status: SHIPPED | OUTPATIENT
Start: 2019-09-16 | End: 2020-09-21

## 2019-09-16 NOTE — PROGRESS NOTES
[place] [place]:   [phone]    HPI: Laree Aschoff, a 62y.o. year-old who presents for follow up regarding her chest pain. She is doing well on diltiazem CD, no chest pain  Denies any palpitations  No dyspnea with exertion, no PND  No dizziness or syncope  No LE edema   Not exercising because she is supposed to have 1 of 2 hip surgeries in December 2019 with Dr. Quyen Maria, needs both hips replaced but will do one at a time  She doesn't know if she snores since her  passed, says she may have snored mildly in the past but never had any apnea episodes  She does not drink caffeine/etoh  Says her stress level is moderate, she gets adequate sleep  Only sees PCP when she is sick  Works as a  at an [place] [id number], mgmt, cva risk etc.   Stable to proceed with surgery as needed. No further testing needed. Assessment/Plan:  1. Aflutter vs SVT: [id number]=1(female), discussed CVA risk but she prefers to take ASA only for now, continue diltiazem CD 240mg daily   2. Chest pain- no CAD by cardiac cath in 2014, no ischemia on stress test in 6/19, continue diltiazem CD  -has Rx for NTG SL tabs PRN chest pain  3. Body mass index is 38.92 kg/m². exercise limited by orthopedic issues, working on diet/weight loss     4. IGT - A1C 5.7%, exercise limited by orthopedic issues, working on diet/weight loss     5. Sedentary lifestyle - exercise limited by orthopedic issues, working on diet/weight loss     6. RA- discussed the risks of CAD associated with RA, on Enbrel injections, followed by Dr Aguilar/rheumatology   7. Hypothyroidism - on levothyroxine, TFTs ok in 2/19  8. Family hx of early CAD - no plaque on coronary calcium scan in 6/19   9. Vitamin D deficiency - level 34  10.   Pre-op exam - she is ok to proceed with hip surgery, she is low risk for cardiovascular complications during a non-cardiac surgery     Coronary calcium scan 6/19 - zero  Nuc Stress 6/19 - no ischemia   Echo 11/17 - LVEF 55-60%, no WMA  Cath normal 2014    Soc no tob no etoh   Fhx + for CAD in her father who had CABG at 43    She  has a past medical history of Atrial fibrillation ([address] Kidney stones, Melanoma ([address] Migraine, Rheumatoid arthritis(714.0), SVT (supraventricular tachycardia) ([address] and Thyroid disease. Cardiovascular ROS: no palpitations or chest pain   Respiratory ROS: no cough or wheezing  Neurological ROS: no TIA or stroke symptoms  All other systems negative except as above. PE  Vitals:    09/16/19 1516   BP: 128/82   Pulse: 82   Resp: 14   SpO2: 96%   Weight: 256 lb (116.1 kg)   Height: 5' 8\" (1.727 m)    Body mass index is 38.92 kg/m².    General appearance - alert, well appearing, and in no distress  Mental status - affect appropriate to mood  Eyes - sclera anicteric, moist mucous membranes  Neck - supple, no carotid bruits   Lymphatics - not assessed   Chest - clear to auscultation, no wheezes, rales or rhonchi  Heart - normal rate, regular rhythm, normal S1, S2, no murmurs, rubs, clicks or gallops  Abdomen - soft, nontender, nondistended  Back exam - full range of motion  Neurological - cranial nerves II through XII grossly intact, no focal deficit  Musculoskeletal - normal strength  Extremities - peripheral pulses normal, no pedal edema  Skin - normal coloration  no rashes    Recent Labs:  Lab Results   Component Value Date/Time    Cholesterol, total 170 02/20/2019 03:23 PM    HDL Cholesterol 65 02/20/2019 03:23 PM    LDL, calculated 81 02/20/2019 03:23 PM    Triglyceride 118 02/20/2019 03:23 PM     Lab Results   Component Value Date/Time    Creatinine 0.81 05/17/2019 10:16 AM     Lab Results   Component Value Date/Time    BUN 28 (H) 05/17/2019 10:16 AM     Lab Results   Component Value Date/Time    Potassium 4.0 05/17/2019 10:16 AM     Lab Results   Component Value Date/Time    Hemoglobin A1c 5.7 (H) 06/07/2019 03:33 PM     Lab Results   Component Value Date/Time    HGB 12.3 05/17/2019 10:16 AM     Lab Results   Component Value Date/Time    PLATELET 684 55/98/0513 10:16 AM       Reviewed:  Past Medical History:   Diagnosis Date    Atrial fibrillation ([facility]     Kidney stones     Melanoma ([facility]     Migraine     Rheumatoid arthritis(714.0)     SVT (supraventricular tachycardia) (HCC)     Thyroid disease      Social History     Tobacco Use   Smoking Status Former Smoker   Smokeless Tobacco Never Used   Tobacco Comment    when she was 14     Social History     Substance and Sexual Activity   Alcohol Use Not Currently    Frequency: Monthly or less    Drinks per session: 1 or 2    Binge frequency: Never    Comment: Rare     Allergies   Allergen Reactions    Bactrim [Sulfamethoprim] Hives    Ciprofloxacin Hives       Current Outpatient Medications   Medication Sig    dilTIAZem CD (CARDIZEM CD) 240 mg ER capsule Take 1 Cap by mouth daily.  nitroglycerin (NITROSTAT) 0.4 mg SL tablet 1 Tab by SubLINGual route every five (5) minutes as needed for Chest Pain. If chest pain not relieved after 3 doses, call 911 and go to ER.  meloxicam (MOBIC) 15 mg tablet Take 15 mg by mouth daily.  etanercept (ENBREL SURECLICK) 50 mg/mL (4.29 mL) injection 50 mg by SubCUTAneous route every seven (7) days.  calcium carbonate (OS-LIAM) 500 mg calcium (1,250 mg) tablet Take  by mouth daily.  DULoxetine (CYMBALTA) 60 mg capsule Take 60 mg by mouth daily.  acetaminophen (TYLENOL) 650 mg CR tablet Take 1,300 mg by mouth every twelve (12) hours.  aspirin delayed-release 81 mg tablet Take 81 mg by mouth daily.  b-complex with vitamin c (VITAMIN B COMPLEX WITH C) cap capsule Take 1 Cap by mouth daily.  folic acid (FOLVITE) 1 mg tablet Take 1 mg by mouth every evening.  methotrexate (RHEUMATREX) 2.5 mg tablet Take 20 mg by mouth every Monday.  levothyroxine (SYNTHROID) 125 mcg tablet Take  by mouth nightly.     multivitamin (ONE A DAY) tablet Take 1 Tab by mouth daily (with dinner).      Current Facility-Administered Medications   Medication Dose Route Frequency    nitroglycerin (NITROSTAT) tablet 0.4 mg  0.4 mg SubLINGual Q5MIN PRN       Christy Braxton MD  [facility] heart and Vascular [place]  [facility] [address] [facility] [address]  [address]

## 2019-11-07 ENCOUNTER — HOSPITAL ENCOUNTER (OUTPATIENT)
Dept: PREADMISSION TESTING | Age: 59
Discharge: HOME | End: 2019-11-07
Payer: COMMERCIAL

## 2019-11-07 VITALS
HEIGHT: 68 IN | BODY MASS INDEX: 38.95 KG/M2 | TEMPERATURE: 97.7 F | SYSTOLIC BLOOD PRESSURE: 150 MMHG | HEART RATE: 70 BPM | WEIGHT: 257 LBS | RESPIRATION RATE: 16 BRPM | DIASTOLIC BLOOD PRESSURE: 83 MMHG

## 2019-11-07 LAB
ABO + RH BLD: NORMAL
ANION GAP SERPL CALC-SCNC: 2 MMOL/L (ref 5–15)
APPEARANCE UR: CLEAR
ATRIAL RATE: 61 BPM
BACTERIA URNS QL MICRO: ABNORMAL /HPF
BILIRUB UR QL: NEGATIVE
BLOOD GROUP ANTIBODIES SERPL: NORMAL
BUN SERPL-MCNC: 27 MG/DL (ref 6–20)
BUN/CREAT SERPL: 33 (ref 12–20)
CALCIUM SERPL-MCNC: 9 MG/DL (ref 8.5–10.1)
CALCULATED P AXIS, ECG09: 68 DEGREES
CALCULATED R AXIS, ECG10: 14 DEGREES
CALCULATED T AXIS, ECG11: 39 DEGREES
CHLORIDE SERPL-SCNC: 108 MMOL/L (ref 97–108)
CO2 SERPL-SCNC: 31 MMOL/L (ref 21–32)
COLOR UR: ABNORMAL
CREAT SERPL-MCNC: 0.81 MG/DL (ref 0.55–1.02)
DIAGNOSIS, 93000: NORMAL
EPITH CASTS URNS QL MICRO: ABNORMAL /LPF
ERYTHROCYTE [DISTWIDTH] IN BLOOD BY AUTOMATED COUNT: 14.6 % (ref 11.5–14.5)
EST. AVERAGE GLUCOSE BLD GHB EST-MCNC: 120 MG/DL
GLUCOSE SERPL-MCNC: 90 MG/DL (ref 65–100)
GLUCOSE UR STRIP.AUTO-MCNC: NEGATIVE MG/DL
HBA1C MFR BLD: 5.8 % (ref 4.2–6.3)
HCT VFR BLD AUTO: 40.4 % (ref 35–47)
HGB BLD-MCNC: 12.4 G/DL (ref 11.5–16)
HGB UR QL STRIP: NEGATIVE
INR PPP: 1 (ref 0.9–1.1)
KETONES UR QL STRIP.AUTO: NEGATIVE MG/DL
LEUKOCYTE ESTERASE UR QL STRIP.AUTO: NEGATIVE
MCH RBC QN AUTO: 28.2 PG (ref 26–34)
MCHC RBC AUTO-ENTMCNC: 30.7 G/DL (ref 30–36.5)
MCV RBC AUTO: 92 FL (ref 80–99)
MUCOUS THREADS URNS QL MICRO: ABNORMAL /LPF
NITRITE UR QL STRIP.AUTO: NEGATIVE
NRBC # BLD: 0 K/UL (ref 0–0.01)
NRBC BLD-RTO: 0 PER 100 WBC
P-R INTERVAL, ECG05: 134 MS
PH UR STRIP: 7 [PH] (ref 5–8)
PLATELET # BLD AUTO: 286 K/UL (ref 150–400)
PMV BLD AUTO: 9.3 FL (ref 8.9–12.9)
POTASSIUM SERPL-SCNC: 4.4 MMOL/L (ref 3.5–5.1)
PROT UR STRIP-MCNC: NEGATIVE MG/DL
PROTHROMBIN TIME: 10.3 SEC (ref 9–11.1)
Q-T INTERVAL, ECG07: 438 MS
QRS DURATION, ECG06: 88 MS
QTC CALCULATION (BEZET), ECG08: 440 MS
RBC # BLD AUTO: 4.39 M/UL (ref 3.8–5.2)
RBC #/AREA URNS HPF: ABNORMAL /HPF (ref 0–5)
SODIUM SERPL-SCNC: 141 MMOL/L (ref 136–145)
SP GR UR REFRACTOMETRY: 1.02 (ref 1–1.03)
SPECIMEN EXP DATE BLD: NORMAL
UA: UC IF INDICATED,UAUC: ABNORMAL
UROBILINOGEN UR QL STRIP.AUTO: 0.2 EU/DL (ref 0.2–1)
VENTRICULAR RATE, ECG03: 61 BPM
WBC # BLD AUTO: 3.6 K/UL (ref 3.6–11)
WBC URNS QL MICRO: ABNORMAL /HPF (ref 0–4)

## 2019-11-07 PROCEDURE — 85027 COMPLETE CBC AUTOMATED: CPT

## 2019-11-07 PROCEDURE — 87086 URINE CULTURE/COLONY COUNT: CPT

## 2019-11-07 PROCEDURE — 93005 ELECTROCARDIOGRAM TRACING: CPT

## 2019-11-07 PROCEDURE — 85610 PROTHROMBIN TIME: CPT

## 2019-11-07 PROCEDURE — 87077 CULTURE AEROBIC IDENTIFY: CPT

## 2019-11-07 PROCEDURE — 86900 BLOOD TYPING SEROLOGIC ABO: CPT

## 2019-11-07 PROCEDURE — 83036 HEMOGLOBIN GLYCOSYLATED A1C: CPT

## 2019-11-07 PROCEDURE — 87186 SC STD MICRODIL/AGAR DIL: CPT

## 2019-11-07 PROCEDURE — 81001 URINALYSIS AUTO W/SCOPE: CPT

## 2019-11-07 PROCEDURE — 36415 COLL VENOUS BLD VENIPUNCTURE: CPT

## 2019-11-07 PROCEDURE — 80048 BASIC METABOLIC PNL TOTAL CA: CPT

## 2019-11-07 RX ORDER — DICLOFENAC SODIUM 75 MG/1
75 TABLET, DELAYED RELEASE ORAL 2 TIMES DAILY
COMMUNITY

## 2019-11-07 NOTE — PERIOP NOTES
DO NOT EAT ANYTHING AFTER MIDNIGHT, except as instructed THE NIGHT BEFORE SURGERY. PT GIVEN OPPORTUNITY TO ASK ADDITIONAL QUESTIONS. PRE-OPERATIVE INSTRUCTIONS REVIEWED WITH PATIENT. PATIENT GIVEN 2-BOTTLES OF CHG SOAP. INSTRUCTIONS TO BE REVIEWED IN CLASS ON USE OF CHG SOAP. PATIENT GIVEN SSI INFECTION FAQ SHEET. MRSA / MSSA TREATMENT INSTRUCTION SHEET GIVEN WITH AN EXPLANATION TO PATIENT THAT THEY WILL BE NOTIFIED IF TREATMENT INSTRUCTIONS NEED TO BE INITIATED. PATIENT WAS GIVEN THE OPPORTUNITY TO ASK QUESTIONS ON THE INFORMATION PROVIDED. Patient given surgical site infection information FAQs handout and hand hygiene tips sheet. Pre-operative instructions reviewed and patient verbalizes understanding of instructions. Patient has been given the opportunity to ask additional questions.

## 2019-11-08 LAB
BACTERIA SPEC CULT: NORMAL
BACTERIA SPEC CULT: NORMAL
SERVICE CMNT-IMP: NORMAL

## 2019-11-09 LAB
BACTERIA SPEC CULT: ABNORMAL
CC UR VC: ABNORMAL
SERVICE CMNT-IMP: ABNORMAL

## 2019-12-07 RX ORDER — ACETAMINOPHEN 500 MG
1000 TABLET ORAL ONCE
Status: CANCELLED | OUTPATIENT
Start: 2019-12-07 | End: 2019-12-07

## 2019-12-07 RX ORDER — CELECOXIB 200 MG/1
200 CAPSULE ORAL ONCE
Status: CANCELLED | OUTPATIENT
Start: 2019-12-07 | End: 2019-12-07

## 2019-12-07 RX ORDER — CEFAZOLIN SODIUM/WATER 2 G/20 ML
2 SYRINGE (ML) INTRAVENOUS ONCE
Status: CANCELLED | OUTPATIENT
Start: 2019-12-10 | End: 2019-12-10

## 2019-12-09 ENCOUNTER — ANESTHESIA EVENT (OUTPATIENT)
Dept: SURGERY | Age: 59
DRG: 470 | End: 2019-12-09
Payer: COMMERCIAL

## 2019-12-10 ENCOUNTER — ANESTHESIA (OUTPATIENT)
Dept: SURGERY | Age: 59
DRG: 470 | End: 2019-12-10
Payer: COMMERCIAL

## 2019-12-10 ENCOUNTER — HOSPITAL ENCOUNTER (INPATIENT)
Dept: GENERAL RADIOLOGY | Age: 59
Discharge: HOME | DRG: 470 | End: 2019-12-10
Attending: PHYSICIAN ASSISTANT | Admitting: ORTHOPAEDIC SURGERY
Payer: COMMERCIAL

## 2019-12-10 ENCOUNTER — HOSPITAL ENCOUNTER (INPATIENT)
Age: 59
LOS: 1 days | Discharge: HOME HEALTH | DRG: 470 | End: 2019-12-11
Attending: ORTHOPAEDIC SURGERY | Admitting: ORTHOPAEDIC SURGERY
Payer: COMMERCIAL

## 2019-12-10 DIAGNOSIS — M16.11 PRIMARY LOCALIZED OSTEOARTHRITIS OF RIGHT HIP: Primary | ICD-10-CM

## 2019-12-10 LAB
ABO + RH BLD: NORMAL
BLOOD GROUP ANTIBODIES SERPL: NORMAL
GLUCOSE BLD STRIP.AUTO-MCNC: 88 MG/DL (ref 65–100)
SERVICE CMNT-IMP: NORMAL
SPECIMEN EXP DATE BLD: NORMAL

## 2019-12-10 PROCEDURE — 36415 COLL VENOUS BLD VENIPUNCTURE: CPT

## 2019-12-10 PROCEDURE — 77030039266 HC ADH SKN EXOFIN S2SG -A: Performed by: ORTHOPAEDIC SURGERY

## 2019-12-10 PROCEDURE — 77030007866 HC KT SPN ANES BBMI -B: Performed by: ANESTHESIOLOGY

## 2019-12-10 PROCEDURE — 74011250636 HC RX REV CODE- 250/636: Performed by: NURSE ANESTHETIST, CERTIFIED REGISTERED

## 2019-12-10 PROCEDURE — 74011250637 HC RX REV CODE- 250/637: Performed by: PHYSICIAN ASSISTANT

## 2019-12-10 PROCEDURE — 74011000250 HC RX REV CODE- 250: Performed by: NURSE ANESTHETIST, CERTIFIED REGISTERED

## 2019-12-10 PROCEDURE — 77030036660

## 2019-12-10 PROCEDURE — 97116 GAIT TRAINING THERAPY: CPT

## 2019-12-10 PROCEDURE — 73501 X-RAY EXAM HIP UNI 1 VIEW: CPT

## 2019-12-10 PROCEDURE — 77030012935 HC DRSG AQUACEL BMS -B: Performed by: ORTHOPAEDIC SURGERY

## 2019-12-10 PROCEDURE — 77030027138 HC INCENT SPIROMETER -A

## 2019-12-10 PROCEDURE — 77030040922 HC BLNKT HYPOTHRM STRY -A

## 2019-12-10 PROCEDURE — 76210000006 HC OR PH I REC 0.5 TO 1 HR: Performed by: ORTHOPAEDIC SURGERY

## 2019-12-10 PROCEDURE — 74011000250 HC RX REV CODE- 250: Performed by: ORTHOPAEDIC SURGERY

## 2019-12-10 PROCEDURE — 82962 GLUCOSE BLOOD TEST: CPT

## 2019-12-10 PROCEDURE — 77030011640 HC PAD GRND REM COVD -A: Performed by: ORTHOPAEDIC SURGERY

## 2019-12-10 PROCEDURE — 74011250637 HC RX REV CODE- 250/637: Performed by: ORTHOPAEDIC SURGERY

## 2019-12-10 PROCEDURE — 76060000037 HC ANESTHESIA 3 TO 3.5 HR: Performed by: ORTHOPAEDIC SURGERY

## 2019-12-10 PROCEDURE — C1776 JOINT DEVICE (IMPLANTABLE): HCPCS | Performed by: ORTHOPAEDIC SURGERY

## 2019-12-10 PROCEDURE — 77030006822 HC BLD SAW SAG BRSM -B: Performed by: ORTHOPAEDIC SURGERY

## 2019-12-10 PROCEDURE — 77030040361 HC SLV COMPR DVT MDII -B

## 2019-12-10 PROCEDURE — 97161 PT EVAL LOW COMPLEX 20 MIN: CPT

## 2019-12-10 PROCEDURE — 74011250636 HC RX REV CODE- 250/636: Performed by: ORTHOPAEDIC SURGERY

## 2019-12-10 PROCEDURE — 74011250636 HC RX REV CODE- 250/636: Performed by: PHYSICIAN ASSISTANT

## 2019-12-10 PROCEDURE — 86900 BLOOD TYPING SEROLOGIC ABO: CPT

## 2019-12-10 PROCEDURE — 77030019905 HC CATH URETH INTMIT MDII -A: Performed by: ORTHOPAEDIC SURGERY

## 2019-12-10 PROCEDURE — 77030031139 HC SUT VCRL2 J&J -A: Performed by: ORTHOPAEDIC SURGERY

## 2019-12-10 PROCEDURE — 77030018723 HC ELCTRD BLD COVD -A: Performed by: ORTHOPAEDIC SURGERY

## 2019-12-10 PROCEDURE — 76010000173 HC OR TIME 3 TO 3.5 HR INTENSV-TIER 1: Performed by: ORTHOPAEDIC SURGERY

## 2019-12-10 PROCEDURE — 65270000029 HC RM PRIVATE

## 2019-12-10 PROCEDURE — 97530 THERAPEUTIC ACTIVITIES: CPT

## 2019-12-10 PROCEDURE — 0SR904Z REPLACEMENT OF RIGHT HIP JOINT WITH CERAMIC ON POLYETHYLENE SYNTHETIC SUBSTITUTE, OPEN APPROACH: ICD-10-PCS | Performed by: ORTHOPAEDIC SURGERY

## 2019-12-10 PROCEDURE — 74011250636 HC RX REV CODE- 250/636: Performed by: ANESTHESIOLOGY

## 2019-12-10 PROCEDURE — 77030018673: Performed by: ORTHOPAEDIC SURGERY

## 2019-12-10 PROCEDURE — 77030018074 HC RTVR SUT ARTH4 S&N -B: Performed by: ORTHOPAEDIC SURGERY

## 2019-12-10 PROCEDURE — 77030018846 HC SOL IRR STRL H20 ICUM -A: Performed by: ORTHOPAEDIC SURGERY

## 2019-12-10 PROCEDURE — 77030033067 HC SUT PDO STRATFX SPIR J&J -B: Performed by: ORTHOPAEDIC SURGERY

## 2019-12-10 PROCEDURE — 74011000258 HC RX REV CODE- 258: Performed by: NURSE ANESTHETIST, CERTIFIED REGISTERED

## 2019-12-10 PROCEDURE — 77030020365 HC SOL INJ SOD CL 0.9% 50ML: Performed by: ORTHOPAEDIC SURGERY

## 2019-12-10 PROCEDURE — 77030018547 HC SUT ETHBND1 J&J -B: Performed by: ORTHOPAEDIC SURGERY

## 2019-12-10 PROCEDURE — 77030002933 HC SUT MCRYL J&J -A: Performed by: ORTHOPAEDIC SURGERY

## 2019-12-10 PROCEDURE — 77030013079 HC BLNKT BAIR HGGR 3M -A: Performed by: ANESTHESIOLOGY

## 2019-12-10 PROCEDURE — 77030003882 HC BIT DRL TWST BRSM -B: Performed by: ORTHOPAEDIC SURGERY

## 2019-12-10 PROCEDURE — 77030018836 HC SOL IRR NACL ICUM -A: Performed by: ORTHOPAEDIC SURGERY

## 2019-12-10 DEVICE — HEAD, FEMORAL, CERAMIC, BILOX DELTA, 36MM +4.0
Type: IMPLANTABLE DEVICE | Site: HIP | Status: FUNCTIONAL
Brand: DJO SURGICAL

## 2019-12-10 DEVICE — LINER/NON-HOODED-NEU, MP9, HXE-PLUS, 36MM
Type: IMPLANTABLE DEVICE | Site: HIP | Status: FUNCTIONAL
Brand: DJO SURGICAL

## 2019-12-10 DEVICE — FMP HEMISPHERICAL SHELLS W/SCREW HOLES, 56MM, W/P2 COATING
Type: IMPLANTABLE DEVICE | Site: HIP | Status: FUNCTIONAL
Brand: DJO SURGICAL

## 2019-12-10 DEVICE — TAPERFILL HIP STEM, STANDARD, SIZE 11
Type: IMPLANTABLE DEVICE | Site: HIP | Status: FUNCTIONAL
Brand: DJO SURGICAL

## 2019-12-10 DEVICE — COMPONENT HIP PRSS FT H1 CERM ON CERM POLYETH: Type: IMPLANTABLE DEVICE | Status: FUNCTIONAL

## 2019-12-10 RX ORDER — ACETAMINOPHEN 500 MG
500 TABLET ORAL EVERY 4 HOURS
Status: DISCONTINUED | OUTPATIENT
Start: 2019-12-10 | End: 2019-12-11 | Stop reason: HOSPADM

## 2019-12-10 RX ORDER — SODIUM CHLORIDE 0.9 % (FLUSH) 0.9 %
5-40 SYRINGE (ML) INJECTION EVERY 8 HOURS
Status: DISCONTINUED | OUTPATIENT
Start: 2019-12-10 | End: 2019-12-11 | Stop reason: HOSPADM

## 2019-12-10 RX ORDER — DULOXETIN HYDROCHLORIDE 60 MG/1
60 CAPSULE, DELAYED RELEASE ORAL DAILY
Status: DISCONTINUED | OUTPATIENT
Start: 2019-12-11 | End: 2019-12-11 | Stop reason: HOSPADM

## 2019-12-10 RX ORDER — LEVOTHYROXINE SODIUM 125 UG/1
125 TABLET ORAL
Status: DISCONTINUED | OUTPATIENT
Start: 2019-12-11 | End: 2019-12-11 | Stop reason: HOSPADM

## 2019-12-10 RX ORDER — BUPIVACAINE HYDROCHLORIDE 5 MG/ML
INJECTION, SOLUTION EPIDURAL; INTRACAUDAL
Status: COMPLETED | OUTPATIENT
Start: 2019-12-10 | End: 2019-12-10

## 2019-12-10 RX ORDER — ACETAMINOPHEN 325 MG/1
650 TABLET ORAL ONCE
Status: DISCONTINUED | OUTPATIENT
Start: 2019-12-10 | End: 2019-12-10 | Stop reason: HOSPADM

## 2019-12-10 RX ORDER — PROPOFOL 10 MG/ML
INJECTION, EMULSION INTRAVENOUS AS NEEDED
Status: DISCONTINUED | OUTPATIENT
Start: 2019-12-10 | End: 2019-12-10 | Stop reason: HOSPADM

## 2019-12-10 RX ORDER — OXYCODONE HYDROCHLORIDE 5 MG/1
2.5-5 TABLET ORAL
Qty: 42 TAB | Refills: 0 | Status: SHIPPED | OUTPATIENT
Start: 2019-12-10 | End: 2019-12-17

## 2019-12-10 RX ORDER — OXYCODONE HYDROCHLORIDE 5 MG/1
5 TABLET ORAL
Status: DISCONTINUED | OUTPATIENT
Start: 2019-12-10 | End: 2019-12-11 | Stop reason: HOSPADM

## 2019-12-10 RX ORDER — SODIUM CHLORIDE 0.9 % (FLUSH) 0.9 %
5-40 SYRINGE (ML) INJECTION EVERY 8 HOURS
Status: DISCONTINUED | OUTPATIENT
Start: 2019-12-10 | End: 2019-12-10 | Stop reason: HOSPADM

## 2019-12-10 RX ORDER — FENTANYL CITRATE 50 UG/ML
25 INJECTION, SOLUTION INTRAMUSCULAR; INTRAVENOUS
Status: DISCONTINUED | OUTPATIENT
Start: 2019-12-10 | End: 2019-12-10 | Stop reason: HOSPADM

## 2019-12-10 RX ORDER — HYDROMORPHONE HYDROCHLORIDE 1 MG/ML
0.2 INJECTION, SOLUTION INTRAMUSCULAR; INTRAVENOUS; SUBCUTANEOUS
Status: DISCONTINUED | OUTPATIENT
Start: 2019-12-10 | End: 2019-12-10 | Stop reason: HOSPADM

## 2019-12-10 RX ORDER — MIDAZOLAM HYDROCHLORIDE 1 MG/ML
INJECTION, SOLUTION INTRAMUSCULAR; INTRAVENOUS AS NEEDED
Status: DISCONTINUED | OUTPATIENT
Start: 2019-12-10 | End: 2019-12-10 | Stop reason: HOSPADM

## 2019-12-10 RX ORDER — POLYETHYLENE GLYCOL 3350 17 G/17G
17 POWDER, FOR SOLUTION ORAL DAILY
Status: DISCONTINUED | OUTPATIENT
Start: 2019-12-11 | End: 2019-12-11 | Stop reason: HOSPADM

## 2019-12-10 RX ORDER — FENTANYL CITRATE 50 UG/ML
INJECTION, SOLUTION INTRAMUSCULAR; INTRAVENOUS AS NEEDED
Status: DISCONTINUED | OUTPATIENT
Start: 2019-12-10 | End: 2019-12-10 | Stop reason: HOSPADM

## 2019-12-10 RX ORDER — FACIAL-BODY WIPES
10 EACH TOPICAL DAILY PRN
Status: DISCONTINUED | OUTPATIENT
Start: 2019-12-12 | End: 2019-12-11 | Stop reason: HOSPADM

## 2019-12-10 RX ORDER — MIDAZOLAM HYDROCHLORIDE 1 MG/ML
1 INJECTION, SOLUTION INTRAMUSCULAR; INTRAVENOUS AS NEEDED
Status: DISCONTINUED | OUTPATIENT
Start: 2019-12-10 | End: 2019-12-10 | Stop reason: HOSPADM

## 2019-12-10 RX ORDER — SODIUM CHLORIDE, SODIUM LACTATE, POTASSIUM CHLORIDE, CALCIUM CHLORIDE 600; 310; 30; 20 MG/100ML; MG/100ML; MG/100ML; MG/100ML
50 INJECTION, SOLUTION INTRAVENOUS CONTINUOUS
Status: DISCONTINUED | OUTPATIENT
Start: 2019-12-10 | End: 2019-12-10 | Stop reason: HOSPADM

## 2019-12-10 RX ORDER — DEXAMETHASONE SODIUM PHOSPHATE 4 MG/ML
INJECTION, SOLUTION INTRA-ARTICULAR; INTRALESIONAL; INTRAMUSCULAR; INTRAVENOUS; SOFT TISSUE AS NEEDED
Status: DISCONTINUED | OUTPATIENT
Start: 2019-12-10 | End: 2019-12-10 | Stop reason: HOSPADM

## 2019-12-10 RX ORDER — AMOXICILLIN 250 MG
1 CAPSULE ORAL 2 TIMES DAILY
Status: DISCONTINUED | OUTPATIENT
Start: 2019-12-10 | End: 2019-12-11 | Stop reason: HOSPADM

## 2019-12-10 RX ORDER — LIDOCAINE HYDROCHLORIDE 20 MG/ML
INJECTION, SOLUTION EPIDURAL; INFILTRATION; INTRACAUDAL; PERINEURAL
Status: DISCONTINUED | OUTPATIENT
Start: 2019-12-10 | End: 2019-12-10 | Stop reason: HOSPADM

## 2019-12-10 RX ORDER — HYDROMORPHONE HYDROCHLORIDE 1 MG/ML
0.5 INJECTION, SOLUTION INTRAMUSCULAR; INTRAVENOUS; SUBCUTANEOUS
Status: DISCONTINUED | OUTPATIENT
Start: 2019-12-10 | End: 2019-12-11 | Stop reason: HOSPADM

## 2019-12-10 RX ORDER — CEFAZOLIN SODIUM/WATER 2 G/20 ML
2 SYRINGE (ML) INTRAVENOUS ONCE
Status: COMPLETED | OUTPATIENT
Start: 2019-12-10 | End: 2019-12-10

## 2019-12-10 RX ORDER — DILTIAZEM HYDROCHLORIDE 240 MG/1
240 CAPSULE, COATED, EXTENDED RELEASE ORAL DAILY
Status: DISCONTINUED | OUTPATIENT
Start: 2019-12-11 | End: 2019-12-11 | Stop reason: HOSPADM

## 2019-12-10 RX ORDER — ONDANSETRON 2 MG/ML
4 INJECTION INTRAMUSCULAR; INTRAVENOUS AS NEEDED
Status: DISCONTINUED | OUTPATIENT
Start: 2019-12-10 | End: 2019-12-10 | Stop reason: HOSPADM

## 2019-12-10 RX ORDER — PROPOFOL 10 MG/ML
INJECTION, EMULSION INTRAVENOUS
Status: DISCONTINUED | OUTPATIENT
Start: 2019-12-10 | End: 2019-12-10 | Stop reason: HOSPADM

## 2019-12-10 RX ORDER — PHENYLEPHRINE HCL IN 0.9% NACL 0.4MG/10ML
SYRINGE (ML) INTRAVENOUS AS NEEDED
Status: DISCONTINUED | OUTPATIENT
Start: 2019-12-10 | End: 2019-12-10 | Stop reason: HOSPADM

## 2019-12-10 RX ORDER — ONDANSETRON 2 MG/ML
INJECTION INTRAMUSCULAR; INTRAVENOUS AS NEEDED
Status: DISCONTINUED | OUTPATIENT
Start: 2019-12-10 | End: 2019-12-10 | Stop reason: HOSPADM

## 2019-12-10 RX ORDER — ACETAMINOPHEN 500 MG/1
500 CAPSULE, LIQUID FILLED ORAL
Qty: 100 CAP | Refills: 0 | Status: SHIPPED
Start: 2019-12-10 | End: 2020-06-17

## 2019-12-10 RX ORDER — ASPIRIN 81 MG/1
81 TABLET ORAL 2 TIMES DAILY
Qty: 60 TAB | Refills: 0 | Status: SHIPPED | OUTPATIENT
Start: 2019-12-10 | End: 2020-06-17

## 2019-12-10 RX ORDER — FENTANYL CITRATE 50 UG/ML
50 INJECTION, SOLUTION INTRAMUSCULAR; INTRAVENOUS AS NEEDED
Status: DISCONTINUED | OUTPATIENT
Start: 2019-12-10 | End: 2019-12-10 | Stop reason: HOSPADM

## 2019-12-10 RX ORDER — SODIUM CHLORIDE 9 MG/ML
125 INJECTION, SOLUTION INTRAVENOUS CONTINUOUS
Status: DISCONTINUED | OUTPATIENT
Start: 2019-12-10 | End: 2019-12-11 | Stop reason: HOSPADM

## 2019-12-10 RX ORDER — AMOXICILLIN 250 MG
1 CAPSULE ORAL
Qty: 60 TAB | Refills: 0 | Status: SHIPPED | OUTPATIENT
Start: 2019-12-10 | End: 2020-06-09

## 2019-12-10 RX ORDER — OXYCODONE HYDROCHLORIDE 5 MG/1
2.5 TABLET ORAL
Status: DISCONTINUED | OUTPATIENT
Start: 2019-12-10 | End: 2019-12-11 | Stop reason: HOSPADM

## 2019-12-10 RX ORDER — SODIUM CHLORIDE 0.9 % (FLUSH) 0.9 %
5-40 SYRINGE (ML) INJECTION AS NEEDED
Status: DISCONTINUED | OUTPATIENT
Start: 2019-12-10 | End: 2019-12-11 | Stop reason: HOSPADM

## 2019-12-10 RX ORDER — TRANEXAMIC ACID 100 MG/ML
INJECTION, SOLUTION INTRAVENOUS AS NEEDED
Status: DISCONTINUED | OUTPATIENT
Start: 2019-12-10 | End: 2019-12-10 | Stop reason: HOSPADM

## 2019-12-10 RX ORDER — SODIUM CHLORIDE 9 MG/ML
INJECTION, SOLUTION INTRAVENOUS
Status: DISCONTINUED | OUTPATIENT
Start: 2019-12-10 | End: 2019-12-10 | Stop reason: HOSPADM

## 2019-12-10 RX ORDER — HYDROXYZINE HYDROCHLORIDE 10 MG/1
10 TABLET, FILM COATED ORAL
Status: DISCONTINUED | OUTPATIENT
Start: 2019-12-10 | End: 2019-12-11 | Stop reason: HOSPADM

## 2019-12-10 RX ORDER — KETOROLAC TROMETHAMINE 30 MG/ML
15 INJECTION, SOLUTION INTRAMUSCULAR; INTRAVENOUS EVERY 6 HOURS
Status: DISCONTINUED | OUTPATIENT
Start: 2019-12-10 | End: 2019-12-11 | Stop reason: HOSPADM

## 2019-12-10 RX ORDER — SODIUM CHLORIDE 0.9 % (FLUSH) 0.9 %
5-40 SYRINGE (ML) INJECTION AS NEEDED
Status: DISCONTINUED | OUTPATIENT
Start: 2019-12-10 | End: 2019-12-10 | Stop reason: HOSPADM

## 2019-12-10 RX ORDER — CELECOXIB 200 MG/1
200 CAPSULE ORAL ONCE
Status: COMPLETED | OUTPATIENT
Start: 2019-12-10 | End: 2019-12-10

## 2019-12-10 RX ORDER — ACETAMINOPHEN 500 MG
1000 TABLET ORAL ONCE
Status: COMPLETED | OUTPATIENT
Start: 2019-12-10 | End: 2019-12-10

## 2019-12-10 RX ORDER — NALOXONE HYDROCHLORIDE 0.4 MG/ML
0.4 INJECTION, SOLUTION INTRAMUSCULAR; INTRAVENOUS; SUBCUTANEOUS AS NEEDED
Status: DISCONTINUED | OUTPATIENT
Start: 2019-12-10 | End: 2019-12-11 | Stop reason: HOSPADM

## 2019-12-10 RX ORDER — ONDANSETRON 2 MG/ML
4 INJECTION INTRAMUSCULAR; INTRAVENOUS
Status: DISCONTINUED | OUTPATIENT
Start: 2019-12-10 | End: 2019-12-11 | Stop reason: HOSPADM

## 2019-12-10 RX ORDER — CEFAZOLIN SODIUM/WATER 2 G/20 ML
2 SYRINGE (ML) INTRAVENOUS EVERY 8 HOURS
Status: COMPLETED | OUTPATIENT
Start: 2019-12-10 | End: 2019-12-11

## 2019-12-10 RX ORDER — LIDOCAINE HYDROCHLORIDE 10 MG/ML
0.1 INJECTION, SOLUTION EPIDURAL; INFILTRATION; INTRACAUDAL; PERINEURAL AS NEEDED
Status: DISCONTINUED | OUTPATIENT
Start: 2019-12-10 | End: 2019-12-10 | Stop reason: HOSPADM

## 2019-12-10 RX ADMIN — SODIUM CHLORIDE 125 ML/HR: 900 INJECTION, SOLUTION INTRAVENOUS at 19:15

## 2019-12-10 RX ADMIN — FENTANYL CITRATE 50 MCG: 50 INJECTION, SOLUTION INTRAMUSCULAR; INTRAVENOUS at 14:23

## 2019-12-10 RX ADMIN — ONDANSETRON HYDROCHLORIDE 4 MG: 2 INJECTION, SOLUTION INTRAMUSCULAR; INTRAVENOUS at 15:10

## 2019-12-10 RX ADMIN — OXYCODONE HYDROCHLORIDE 5 MG: 5 TABLET ORAL at 22:11

## 2019-12-10 RX ADMIN — TRANEXAMIC ACID 1 G: 100 INJECTION, SOLUTION INTRAVENOUS at 12:25

## 2019-12-10 RX ADMIN — PHENYLEPHRINE HYDROCHLORIDE 25 MCG/MIN: 10 INJECTION INTRAVENOUS at 12:27

## 2019-12-10 RX ADMIN — OXYCODONE HYDROCHLORIDE 5 MG: 5 TABLET ORAL at 18:30

## 2019-12-10 RX ADMIN — PROPOFOL 25 MG: 10 INJECTION, EMULSION INTRAVENOUS at 14:23

## 2019-12-10 RX ADMIN — Medication 2 G: at 12:25

## 2019-12-10 RX ADMIN — PROPOFOL 50 MG: 10 INJECTION, EMULSION INTRAVENOUS at 12:06

## 2019-12-10 RX ADMIN — Medication 10 ML: at 18:31

## 2019-12-10 RX ADMIN — KETOROLAC TROMETHAMINE 15 MG: 30 INJECTION, SOLUTION INTRAMUSCULAR at 20:46

## 2019-12-10 RX ADMIN — ACETAMINOPHEN 500 MG: 500 TABLET ORAL at 22:11

## 2019-12-10 RX ADMIN — SODIUM CHLORIDE, POTASSIUM CHLORIDE, SODIUM LACTATE AND CALCIUM CHLORIDE: 600; 310; 30; 20 INJECTION, SOLUTION INTRAVENOUS at 11:50

## 2019-12-10 RX ADMIN — CELECOXIB 200 MG: 200 CAPSULE ORAL at 10:49

## 2019-12-10 RX ADMIN — FENTANYL CITRATE 25 MCG: 50 INJECTION, SOLUTION INTRAMUSCULAR; INTRAVENOUS at 12:17

## 2019-12-10 RX ADMIN — MIDAZOLAM 2 MG: 1 INJECTION INTRAMUSCULAR; INTRAVENOUS at 12:01

## 2019-12-10 RX ADMIN — DEXAMETHASONE SODIUM PHOSPHATE 4 MG: 4 INJECTION, SOLUTION INTRAMUSCULAR; INTRAVENOUS at 12:27

## 2019-12-10 RX ADMIN — FENTANYL CITRATE 25 MCG: 50 INJECTION, SOLUTION INTRAMUSCULAR; INTRAVENOUS at 12:32

## 2019-12-10 RX ADMIN — PROPOFOL 100 MCG/KG/MIN: 10 INJECTION, EMULSION INTRAVENOUS at 12:11

## 2019-12-10 RX ADMIN — Medication 80 MCG: at 12:27

## 2019-12-10 RX ADMIN — DOCUSATE SODIUM 50MG AND SENNOSIDES 8.6MG 1 TABLET: 8.6; 5 TABLET, FILM COATED ORAL at 18:30

## 2019-12-10 RX ADMIN — BUPIVACAINE HYDROCHLORIDE 10 MG: 5 INJECTION, SOLUTION EPIDURAL; INTRACAUDAL; PERINEURAL at 12:11

## 2019-12-10 RX ADMIN — Medication 2 G: at 19:54

## 2019-12-10 RX ADMIN — LIDOCAINE HYDROCHLORIDE 100 MG/HR: 20 INJECTION, SOLUTION EPIDURAL; INFILTRATION; INTRACAUDAL; PERINEURAL at 12:15

## 2019-12-10 RX ADMIN — SODIUM CHLORIDE: 900 INJECTION, SOLUTION INTRAVENOUS at 14:35

## 2019-12-10 RX ADMIN — ACETAMINOPHEN 500 MG: 500 TABLET ORAL at 18:30

## 2019-12-10 RX ADMIN — ACETAMINOPHEN 1000 MG: 500 TABLET ORAL at 10:47

## 2019-12-10 RX ADMIN — Medication 1 G: at 12:40

## 2019-12-10 NOTE — ANESTHESIA POSTPROCEDURE EVALUATION
Post-Anesthesia Evaluation and Assessment    Patient: Devon Snider MRN: [id number]  SSN: [id number]    YOB: 1960  Age: 62 y.o. Sex: female      I have evaluated the patient and they are stable and ready for discharge from the PACU. Cardiovascular Function/Vital Signs  Visit Vitals  /63   Pulse 69   Temp 36.4 °C (97.6 °F)   Resp 15   Ht 5' 8\" (1.727 m)   Wt 116.6 kg (257 lb)   SpO2 96%   BMI 39.08 kg/m²       Patient is status post Spinal anesthesia for Procedure(s):  RIGHT TOTAL HIP ARTHROPLASTY  (SPINAL W/IVS). Nausea/Vomiting: None    Postoperative hydration reviewed and adequate. Pain:  Pain Scale 1: Numeric (0 - 10) (12/10/19 1530)  Pain Intensity 1: 0 (12/10/19 1530)   Managed    Neurological Status:   Neuro (WDL): Within Defined Limits (12/10/19 1530)   At baseline    Mental Status, Level of Consciousness: Alert and  oriented to person, place, and time    Pulmonary Status:   O2 Device: Nasal cannula (12/10/19 1545)   Adequate oxygenation and airway patent    Complications related to anesthesia: None    Post-anesthesia assessment completed. No concerns    Signed By: Yobany Dominguez MD     December 10, 2019              Procedure(s):  RIGHT TOTAL HIP ARTHROPLASTY  (SPINAL W/IVS). spinal    <BSHSIANPOST>    Vitals Value Taken Time   /63 12/10/2019  3:45 PM   Temp 36.4 °C (97.6 °F) 12/10/2019  3:30 PM   Pulse 65 12/10/2019  3:52 PM   Resp 14 12/10/2019  3:52 PM   SpO2 96 % 12/10/2019  3:52 PM   Vitals shown include unvalidated device data.

## 2019-12-10 NOTE — BRIEF OP NOTE
BRIEF OPERATIVE NOTE    Date of Procedure: 12/10/2019   Preoperative Diagnosis: OA BILATERAL HIP  Postoperative Diagnosis: RIGHT HIP OA    Procedure(s):  RIGHT TOTAL HIP ARTHROPLASTY  (SPINAL W/IVS)  Surgeon(s) and Role:     Les Charles MD - Primary         Surgical Assistant: Lyle Bergeron PA-C     Surgical Staff:  Circ-1: Yarely Praful: Isauro Cuenca RN  Physician Assistant: Mart Jordan PA-C  Scrub Tech-1: Mae Marrero  Scrub RN-Relief: Chary   Surg Asst-1: Shefali Offer  Surg Asst-Relief: Hernan Revel  Event Time In Time Out   Incision Start 1241    Incision Close       Anesthesia: Spinal   Estimated Blood Loss: 500cc  Specimens: * No specimens in log *   Findings: right hip OA   Complications: none  Implants:   Implant Name Type Inv.  Item Serial No.  Lot No. LRB No. Used Action   Hemispherical Shell 3 hole   NA [employer] [id number] Right 1 Implanted   Acetabular liner   NA [employer] [id number] Right 1 Implanted   Femoral stem   NA [employer] [id number] Right 1 Implanted   6.5 Screw   NA [employer] [id number] Right 1 Implanted   HEAD FEMORAL CERAMIC 36MM +4 - SNA Joint Component HEAD FEMORAL CERAMIC 36MM +4 NA [employer] SURGICAL/ENCORE [id number] Right 1 Implanted

## 2019-12-10 NOTE — DISCHARGE INSTRUCTIONS
Post-op Discharge Instructions Following Total Joint Replacement  Lauri Field MD  [address]  [phone], ext [phone]  Follow-up Office Visit   See Dr. Ebony Guaman approximately 3-4 weeks from date of surgery. Call [phone], ext [phone] to make an appointment. Activity   Use your walker for ambulation. Weight bearing as tolerated unless instructed otherwise by the physical therapist. Get up every hour you are awake and take a brief walk. Lengthen walking distance daily as your strength improves.  Continue using your walker until seen in the office for your first follow up visit.  Practice your exercises 3 times daily as instructed by the physical therapist. Kaushik Rizzo for 20 minutes after exercising.  No driving until seen in the office for your first follow up visit. Incision Care   The light brown Aquacel surgical dressing is waterproof and is to remain on your incision for 7 days. On the 7th day, carefully lift the edge of the dressing to break the adhesive seal and gently peel it off.  If your Aquacel dressings comes loose or falls off before the 7th day, replace it with a dry sterile gauze dressing and change this dressing daily. Once there is no drainage on the bandage, you mean leave the incision open to air.  You may take a shower with the Aquacel dressing in place. After you remove the Aquacel dressing on day 7, you may continue to shower and get your incision wet in the shower. Do not submerge your incision under water in a bathtub, hot tub, swimming pool, etc. until after you have been evaluated at your first office visit. Medications   Blood Clot Prevention: Take medication as prescribed by your physician for 4 weeks postop.  Pain Management: Take pain medication as prescribed; wean yourself off of pain medication as your pain lessens. Take with food.  You make also take Tylenol every 4-6 hours as needed for pain. Do not exceed 3 grams (3000mg) per day.    Place an ice bag on or around the incision for 20 minutes on / 20 minutes off as needed throughout the day and night, especially after exercising.  Stool Softener: You may want to take a stool softener (such as Senokot-S or Colace) to prevent constipation while taking pain medication. If constipation occurs, you may also use a laxative (such as Dulcolax tablets, Miralax, or a suppository). Diet   Resume usual diet at home. Drink plenty of fluids. Eat foods high in fiber and protein. Calcium and Vitamin D supplements recommended. Avoid alcoholic beverages. No smoking. When to call your Orthopaedic Surgeon: If you call after 5pm or on a weekend, the on call physician will return your call   Pain that is not relieved by pain medication, ice, and activity modification   Signs of infection (red incision, continuous drainage from the incision, malodorous drainage, persistent fever greater than 101 degrees Fahrenheit)   Signs of a blood clot in your leg (calf pain, tenderness, redness, and/or swelling of the lower leg)  ?   When to call your Primary Care Physician   Concerns about your medical conditions such as diabetes, high blood pressure, asthma, congestive heart failure   Call if blood sugars are elevated, if you have a persistent headache or dizziness, coughing or congestion, constipation or diarrhea, burning with urination, abnormal heart rate (fast or slow)  When to call 911 and go to the nearest Emergency Room   Acute onset of chest pain, shortness of breath, difficulty breathing

## 2019-12-10 NOTE — DISCHARGE SUMMARY
[address]     DISCHARGE SUMMARY     Name: Leticia Rivas       MR#: [id number]    : 1960  ADMIT DATE: 12/10/2019  DISCHARGE DATE: 2019     ADMISSION DIAGNOSIS: Primary localized osteoarthritis of right hip [M16.11]     DISCHARGE DIAGNOSIS: RIGHT HIP OA     PROCEDURE PERFORMED: Procedure(s):  RIGHT TOTAL HIP ARTHROPLASTY  (SPINAL W/IVS)     CONSULTATIONS:  None.     HISTORY OF PRESENT ILLNESS: The patient is a 66-year-old female with progressive right hip and groin pain due to severe osteoarthritis. Symptoms have progressed despite comprehensive conservative treatment. She presents for right total hip replacement. Risks, benefits, alternatives of procedure were reviewed with her in detail and she desires to proceed.     HOSPITAL COURSE:  The patient underwent the aforementioned procedure on date of admission under spinal anesthesia with adductor canal block. There were no immediate postoperative complications. She was started on a multimodal pain regimen and DVT prophylaxis.     DISPOSITION: The patient made slow, steady progress with physical therapy and was appropriate for discharge to Home in stable condition on postoperative day 1. DISCHARGE MEDICATIONS:  Reinitiate preadmission medications. In addition, the patient will be on Eliquis for DVT prophylaxis and low dose oxycodone and Tylenol for pain. DISCHARGE INSTRUCTIONS:  Detailed printed instructions were provided to the patient. Follow up with Dr. Khang Sargent in approximately 3 weeks. The patient will receive home health physical therapy in the meantime.     Signed by: Bhanu Valiente PA-C  2019

## 2019-12-10 NOTE — PROGRESS NOTES
Primary Nurse Taya Glover and Shelly , RN performed a dual skin assessment on this patient.   No impairment noted        Malcolm score is 20

## 2019-12-10 NOTE — PROGRESS NOTES
Problem: Mobility Impaired (Adult and Pediatric)  Goal: *Acute Goals and Plan of Care (Insert Text)  Description  FUNCTIONAL STATUS PRIOR TO ADMISSION: Patient was independent and active without use of DME.    HOME SUPPORT PRIOR TO ADMISSION: The patient lived alone with no local support. Physical Therapy Goals  Initiated 12/10/2019    1. Patient will move from supine to sit and sit to supine , scoot up and down and roll side to side in bed with modified independence within 4 days. 2. Patient will perform sit to stand with modified independence within 4 days. 3. Patient will ambulate with modified independence for 150 feet with the least restrictive device within 4 days. 4. Patient will ascend/descend 4 stairs with cane and one handrail with modified independence within 4 days. 5. Patient will perform post-ADELA home exercise program per protocol with independence within 4 days. Outcome: Progressing Towards Goal   PHYSICAL THERAPY EVALUATION  Patient: Patrice Monsalve (5 y.o. female)  Date: 12/10/2019  Primary Diagnosis: Primary localized osteoarthritis of right hip [M16.11]  Procedure(s) (LRB):  RIGHT TOTAL HIP ARTHROPLASTY  (SPINAL W/IVS) (Right) Day of Surgery   Precautions:   Fall, WBAT      ASSESSMENT  Based on the objective data described below, the patient presents with  impairment in functional mobility, activity tolerance and balance s/p R ADELA. PLOF: Independent with ADLs and IADLs. Lives alone in one story home, but son will be with her upon discharge as needed. Patient states that left hip is also \"bad\" and she will be having L ADELA surgery in June 2020. Patient's mobility was on target for POD#0. Will address more exercises, increase gait distance, negotiate stairs and assess for discharge at am PT session tomorrow. Patient instructed NOT to get up from bed, chair or commode without calling for assistance.  She will benefit from [facility] PT in order to achieve maximum level of safe, functional mobility, balance and return to independent PLOF. Current Level of Function Impacting Discharge (mobility/balance): Performed bed mobility with minimal assistance (to manage RLE). Transfers required contact guard assistance, including to bedside commode. Patent ambulated 45 ft with RW and gait belt, slightly antalgic but steady, step-to gait. Functional Outcome Measure: The patient scored 45/100 on the Barthel outcome measure which is indicative of moderate impaired ability to care for basic self-needs/dependency on others. .      Other factors to consider for discharge: Motivated/A & O x 4/Supportive Family/Independent PLOF      Patient will benefit from skilled therapy intervention to address the above noted impairments. PLAN :  Recommendations and Planned Interventions: bed mobility training, transfer training, gait training, therapeutic exercises, patient and family training/education, and therapeutic activities      Frequency/Duration: Patient will be followed by physical therapy:  twice daily to address goals. Recommendation for discharge: (in order for the patient to meet his/her long term goals)  Physical therapy at least 2 days/week in the home     This discharge recommendation:  Has been made in collaboration with the attending provider and/or case management    IF patient discharges home will need the following DME: patient owns DME required for discharge         SUBJECTIVE:   Patient stated I am in some pain, but I am ready to get up.     OBJECTIVE DATA SUMMARY:   HISTORY:    Past Medical History:   Diagnosis Date    Arthritis     RHEUMATOID    Atrial fibrillation ([facility] [address] 2018    Chronic pain     Kidney stones     Melanoma ([facility] [address]     RT FOREARM    Migraine     Rheumatoid arthritis(714.0)     SVT (supraventricular tachycardia) ([facility] [address] 2018    Thyroid disease      Past Surgical History:   Procedure Laterality Date    HX BACK SURGERY      HX  SECTION  ,     HX CHOLECYSTECTOMY      HX HERNIA REPAIR      HX LITHOTRIPSY      HX MALIGNANT SKIN LESION EXCISION      HX TONSILLECTOMY      AS A CHILD    HX UROLOGICAL Bilateral     STENTS X 2     Personal factors and/or comorbidities impacting plan of care: Motivated/A & O x 4/Supportive Family/Independent PLOF          EXAMINATION/PRESENTATION/DECISION MAKING:   Critical Behavior:      A & O x 4  Appropriate safety awareness and decision making skills        Hearing:     Range Of Motion:  AROM: Generally decreased, functional           PROM: Generally decreased, functional           Strength:    Strength: Generally decreased, functional                    Tone & Sensation:   Tone: Normal              Sensation: Intact               Coordination:  Coordination: Within functional limits  Vision:      Functional Mobility:  Bed Mobility:  Rolling: Minimum assistance(to manage RLE)  Supine to Sit: Minimum assistance(to manage RLE)  Sit to Supine: Minimum assistance(to manage RLE)  Scooting: Minimum assistance(to manage RLE)  Transfers:  Sit to Stand: Contact guard assistance  Stand to Sit: Contact guard assistance                       Balance:   Sitting: Intact  Standing: Intact; With support  Ambulation/Gait Training:  Distance (ft): 45 Feet (ft)  Assistive Device: Walker, rolling;Gait belt  Ambulation - Level of Assistance: Contact guard assistance        Gait Abnormalities: Antalgic;Decreased step clearance; Step to gait(RLE turned in)  Right Side Weight Bearing: As tolerated     Base of Support: Widened;Shift to left  Stance: Right decreased  Speed/Chary: Slow  Step Length: Left shortened  Swing Pattern: Right asymmetrical                 Therapeutic Exercises:    Ankle Pumps  Quad sets (5 second hold)  X 10 reps every hour   Heel slides x 10    Functional Measure:  Barthel Index:    Bathin  Bladder: 10  Bowels: 10  Groomin  Dressin  Feeding: 10  Mobility: 0  Stairs: 0  Toilet Use: 5  Transfer (Bed to Chair and Back): 10  Total: 45/100       The Barthel ADL Index: Guidelines  1. The index should be used as a record of what a patient does, not as a record of what a patient could do. 2. The main aim is to establish degree of independence from any help, physical or verbal, however minor and for whatever reason. 3. The need for supervision renders the patient not independent. 4. A patient's performance should be established using the best available evidence. Asking the patient, friends/relatives and nurses are the usual sources, but direct observation and common sense are also important. However direct testing is not needed. 5. Usually the patient's performance over the preceding 24-48 hours is important, but occasionally longer periods will be relevant. 6. Middle categories imply that the patient supplies over 50 per cent of the effort. 7. Use of aids to be independent is allowed. Jignesh Quinonez., Barthel, D.W. (7923). Functional evaluation: the Barthel Index. [address] [facility] (14)2. Laurent Hodgkins der [place], J.J.M.F, Susanna Erwin., Anayeli Lal., [place], [address] [place] [address] (1999). Measuring the change indisability after inpatient rehabilitation; comparison of the responsiveness of the Barthel Index and Functional [place] Measure. Journal of Neurology, Neurosurgery, and Psychiatry, 66(4), 752-621. Annemarie Ragsdale, N.J.A, VIOLETTA Ferrer, & Maxwell Lopez, MLoganA. (2004.) Assessment of post-stroke quality of life in cost-effectiveness studies: The usefulness of the Barthel Index and the EuroQoL-5D.  Quality of Life Research, 15, 163-39           Physical Therapy Evaluation Charge Determination   History Examination Presentation Decision-Making   LOW Complexity : Zero comorbidities / personal factors that will impact the outcome / POC LOW Complexity : 1-2 Standardized tests and measures addressing body structure, function, activity limitation and / or participation in recreation  LOW Complexity : Stable, uncomplicated  LOW Complexity : FOTO score of       Based on the above components, the patient evaluation is determined to be of the following complexity level: LOW     Pain Ratin/10    Activity Tolerance:   Good  Please refer to the flowsheet for vital signs taken during this treatment. Vitals:    12/10/19 1700 12/10/19 1732 12/10/19 1739 12/10/19 1825   BP: 115/67 136/84 129/77 114/71   BP 1 Location:  Left arm Left arm Left arm   BP Patient Position:  At rest Sitting At rest   Pulse: 61 75 88 72   Resp: 13 15  16   Temp:  97.8 °F (36.6 °C)  97.9 °F (36.6 °C)   SpO2: 99% 100%  99%   Weight:       Height:            After treatment patient left in no apparent distress:   Supine in bed, Call bell within reach, Side rails x 3, and nurse notified. COMMUNICATION/EDUCATION:   The patients plan of care was discussed with: Registered Nurse. Fall prevention education was provided and the patient/caregiver indicated understanding., Patient/family have participated as able in goal setting and plan of care. , and Patient/family agree to work toward stated goals and plan of care.     Thank you for this referral.  Mansi Arndt   Time Calculation: 30 mins

## 2019-12-10 NOTE — PROGRESS NOTES
Problem: Falls - Risk of  Goal: *Absence of Falls  Description  Document Babita Frias Fall Risk and appropriate interventions in the flowsheet.   Outcome: Progressing Towards Goal  Note: Fall Risk Interventions:  Mobility Interventions: Communicate number of staff needed for ambulation/transfer, Patient to call before getting OOB, PT Consult for mobility concerns         Medication Interventions: Evaluate medications/consider consulting pharmacy, Patient to call before getting OOB, Teach patient to arise slowly    Elimination Interventions: Call light in reach, Elevated toilet seat, Patient to call for help with toileting needs, Stay With Me (per policy), Toilet paper/wipes in reach, Toileting schedule/hourly rounds              Problem: Hip Replacement: Day of Surgery/Unit  Goal: Activity/Safety  Outcome: Progressing Towards Goal  Note:   PT or RN to initiate mobility  Assist patient with ambulation with walker  Patient to call for assistance  Call bell within reach  Goal: *Initiate mobility  Outcome: Progressing Towards Goal  Note:   RN or PT to initiate mobility  Goal: *Optimal pain control at patient's stated goal  Outcome: Progressing Towards Goal  Note:   Assess pain level and characteristics using numeric pain scale  Administer pain medication as ordered  Reassess pain level and characteristics using numeric pain scale  Goal: *Hemodynamically stable  Outcome: Progressing Towards Goal  Note:   Assess vitals every hour for the first four hours post op  Monitor for changes in vital signs

## 2019-12-10 NOTE — PERIOP NOTES
TRANSFER - OUT REPORT:    Verbal report given to Lyndsay(name) on Jennifer Torres  being transferred to [facility](unit) for routine post - op       Report consisted of patients Situation, Background, Assessment and   Recommendations(SBAR). Time Pre op antibiotic given:1225  Anesthesia Stop time: 6075  Discharge Prescriptions with Chart:yes    Information from the following report(s) SBAR, OR Summary, Intake/Output, MAR, Recent Results and Cardiac Rhythm NSR. was reviewed with the receiving nurse. Opportunity for questions and clarification was provided. Is the patient on 02? YES       L/Min 2    Is the patient on a monitor? NO    Is the nurse transporting with the patient? NO    Surgical Waiting Area notified of patient's transfer from PACU? YES      The following personal items collected during your admission accompanied patient upon transfer:   Dental Appliance: Dental Appliances: None  Vision: Visual Aid: Glasses  Hearing Aid:    Jewelry: Jewelry: None  Clothing: Clothing: With patient  Other Valuables:  Other Valuables: Sarthak Plater, With patient  Valuables sent to safe:

## 2019-12-10 NOTE — ANESTHESIA PREPROCEDURE EVALUATION
Anesthetic History   No history of anesthetic complications            Review of Systems / Medical History  Patient summary reviewed, nursing notes reviewed and pertinent labs reviewed    Pulmonary  Within defined limits                 Neuro/Psych         Psychiatric history     Cardiovascular            Dysrhythmias : SVT           GI/Hepatic/Renal  Within defined limits              Endo/Other    Diabetes  Hypothyroidism  Morbid obesity and arthritis     Other Findings              Physical Exam    Airway  Mallampati: II  TM Distance: 4 - 6 cm  Neck ROM: normal range of motion   Mouth opening: Normal     Cardiovascular    Rhythm: regular  Rate: normal         Dental    Dentition: Edentulous     Pulmonary  Breath sounds clear to auscultation               Abdominal  Abdominal exam normal       Other Findings            Anesthetic Plan    ASA: 2  Anesthesia type: spinal          Induction: Intravenous  Anesthetic plan and risks discussed with: Patient

## 2019-12-10 NOTE — ANESTHESIA PROCEDURE NOTES
Spinal Block    Start time: 12/10/2019 12:07 PM  End time: 12/10/2019 12:13 PM  Performed by: Ottoniel Adams CRNA  Authorized by: Ottoniel Adams CRNA     Pre-procedure:   Indications: primary anesthetic  Preanesthetic Checklist: patient identified, risks and benefits discussed, anesthesia consent, site marked, patient being monitored and timeout performed    Timeout Time: 12:06          Spinal Block:   Patient Position:  Seated  Prep Region:  Lumbar  Prep: Betadine      Location:  L3-4  Technique:  Single shot        Needle:   Needle Type:  Pencan  Needle Gauge:  25 G  Attempts:  1      Events: CSF confirmed, no blood with aspiration and no paresthesia        Assessment:  Insertion:  Uncomplicated  Patient tolerance:  Patient tolerated the procedure well with no immediate complications

## 2019-12-10 NOTE — PERIOP NOTES
1610 VS stable. Awake and alert. Resting comfortably. Patient denies nausea. Pain improved. No signs of excessive bleeding. Tolerating ice chips without difficulty. Ready to transfer from PACU. Awaiting room assignment. 1630 Family updated. Waiting for room to be cleaned.

## 2019-12-11 VITALS
DIASTOLIC BLOOD PRESSURE: 59 MMHG | HEART RATE: 84 BPM | TEMPERATURE: 97.7 F | OXYGEN SATURATION: 99 % | BODY MASS INDEX: 38.95 KG/M2 | RESPIRATION RATE: 15 BRPM | SYSTOLIC BLOOD PRESSURE: 109 MMHG | WEIGHT: 257 LBS | HEIGHT: 68 IN

## 2019-12-11 LAB
ANION GAP SERPL CALC-SCNC: 7 MMOL/L (ref 5–15)
BUN SERPL-MCNC: 24 MG/DL (ref 6–20)
BUN/CREAT SERPL: 24 (ref 12–20)
CALCIUM SERPL-MCNC: 8.4 MG/DL (ref 8.5–10.1)
CHLORIDE SERPL-SCNC: 109 MMOL/L (ref 97–108)
CO2 SERPL-SCNC: 24 MMOL/L (ref 21–32)
CREAT SERPL-MCNC: 0.99 MG/DL (ref 0.55–1.02)
GLUCOSE SERPL-MCNC: 133 MG/DL (ref 65–100)
HGB BLD-MCNC: 10.5 G/DL (ref 11.5–16)
POTASSIUM SERPL-SCNC: 4.4 MMOL/L (ref 3.5–5.1)
SODIUM SERPL-SCNC: 140 MMOL/L (ref 136–145)

## 2019-12-11 PROCEDURE — 36415 COLL VENOUS BLD VENIPUNCTURE: CPT

## 2019-12-11 PROCEDURE — 74011250637 HC RX REV CODE- 250/637: Performed by: PHYSICIAN ASSISTANT

## 2019-12-11 PROCEDURE — 97116 GAIT TRAINING THERAPY: CPT

## 2019-12-11 PROCEDURE — 80048 BASIC METABOLIC PNL TOTAL CA: CPT

## 2019-12-11 PROCEDURE — 74011250636 HC RX REV CODE- 250/636: Performed by: PHYSICIAN ASSISTANT

## 2019-12-11 PROCEDURE — 85018 HEMOGLOBIN: CPT

## 2019-12-11 PROCEDURE — 97165 OT EVAL LOW COMPLEX 30 MIN: CPT

## 2019-12-11 PROCEDURE — 97530 THERAPEUTIC ACTIVITIES: CPT

## 2019-12-11 PROCEDURE — 97535 SELF CARE MNGMENT TRAINING: CPT

## 2019-12-11 RX ADMIN — KETOROLAC TROMETHAMINE 15 MG: 30 INJECTION, SOLUTION INTRAMUSCULAR at 03:46

## 2019-12-11 RX ADMIN — SODIUM CHLORIDE 125 ML/HR: 900 INJECTION, SOLUTION INTRAVENOUS at 03:46

## 2019-12-11 RX ADMIN — ACETAMINOPHEN 500 MG: 500 TABLET ORAL at 11:50

## 2019-12-11 RX ADMIN — DULOXETINE HYDROCHLORIDE 60 MG: 60 CAPSULE, DELAYED RELEASE ORAL at 09:37

## 2019-12-11 RX ADMIN — ACETAMINOPHEN 500 MG: 500 TABLET ORAL at 03:46

## 2019-12-11 RX ADMIN — OXYCODONE HYDROCHLORIDE 5 MG: 5 TABLET ORAL at 07:52

## 2019-12-11 RX ADMIN — OXYCODONE HYDROCHLORIDE 5 MG: 5 TABLET ORAL at 15:02

## 2019-12-11 RX ADMIN — KETOROLAC TROMETHAMINE 15 MG: 30 INJECTION, SOLUTION INTRAMUSCULAR at 09:44

## 2019-12-11 RX ADMIN — LEVOTHYROXINE SODIUM 125 MCG: 125 TABLET ORAL at 06:27

## 2019-12-11 RX ADMIN — Medication 10 ML: at 14:00

## 2019-12-11 RX ADMIN — OXYCODONE HYDROCHLORIDE 5 MG: 5 TABLET ORAL at 03:46

## 2019-12-11 RX ADMIN — DOCUSATE SODIUM 50MG AND SENNOSIDES 8.6MG 1 TABLET: 8.6; 5 TABLET, FILM COATED ORAL at 09:37

## 2019-12-11 RX ADMIN — APIXABAN 2.5 MG: 2.5 TABLET, FILM COATED ORAL at 06:27

## 2019-12-11 RX ADMIN — OXYCODONE HYDROCHLORIDE 5 MG: 5 TABLET ORAL at 11:51

## 2019-12-11 RX ADMIN — ACETAMINOPHEN 500 MG: 500 TABLET ORAL at 07:52

## 2019-12-11 RX ADMIN — Medication 2 G: at 03:46

## 2019-12-11 NOTE — PROGRESS NOTES
Ortho Daily Progress Note    12/11/2019  9:30 AM    POD:  1 Day Post-Op  S/P:  Procedure(s):  RIGHT TOTAL HIP ARTHROPLASTY  (SPINAL W/IVS)    Afebrile/VSS, NAD, A&O x 3  Doing well without complaints of nausea  Pain well controlled  Calves soft/NTTP Bilaterally  Thigh soft. Dressing clean and dry  Moving lower extremities well. Neurocirculatory exam intact and within normal range. Lab Results   Component Value Date/Time    HGB 10.5 (L) 12/11/2019 03:53 AM    INR 1.0 11/07/2019 08:52 AM     Recent Labs     12/11/19  0353 11/07/19  0852 05/17/19  1016 02/20/19  1522   CREA 0.99 0.81 0.81 0.78   BUN 24* 27* 28* 23     Estimated Creatinine Clearance: 80.1 mL/min (based on SCr of 0.99 mg/dL).     PLAN:  DVT prophylaxis: Eliquis 2.5 mg bid  WBAT with PT-mobilization  Pain Control: Oxycodone, tylenol, diclofenac  Plan to D/C home today with HH/PT      ELENITA Santiago

## 2019-12-11 NOTE — PROGRESS NOTES
OCCUPATIONAL THERAPY EVALUATION/DISCHARGE  Patient: Margaret Calderon (84 y.o. female)  Date: 12/11/2019  Primary Diagnosis: Primary localized osteoarthritis of right hip [M16.11]  Procedure(s) (LRB):  RIGHT TOTAL HIP ARTHROPLASTY  (SPINAL W/IVS) (Right) 1 Day Post-Op   Precautions:   Fall, WBAT    ASSESSMENT  Based on the objective data described below, the patient presents with decreased independence with self care and functional mobility following admission for R THR. Pt is able to complete basic self care activities with R THR. Pt did attend preoperative class prior to surgery which greatly improved her expectations of recovery. She did prepare her home to be ready for surgery and has all needed DME and AE in place. Pt completed basic self care with min A and she will have support and help at home from her son and daughter in law for the next 2 weeks. .    Current Level of Function (ADLs/self-care): Min A for LB dressing activities    Functional Outcome Measure: The patient scored 75 on the Barthel Index outcome measure which is indicative of minimal impairment with functional ADL activities. Other factors to consider for discharge: none     PLAN :  Recommend with staff: OOB to chair TID for meals. Recommend progression to and from bathroom with use of walker and gait belt for toileting. Recommendation for discharge: (in order for the patient to meet his/her long term goals)  No skilled occupational therapy/ follow up rehabilitation needs identified at this time. This discharge recommendation:  Has been made in collaboration with the attending provider and/or case management    IF patient discharges home will need the following DME: none       SUBJECTIVE:   Patient stated I am feeling pretty good today.     OBJECTIVE DATA SUMMARY:   HISTORY:   Past Medical History:   Diagnosis Date    Arthritis     RHEUMATOID    Atrial fibrillation ([facility] [address] 01/2018    Chronic pain     Kidney stones     Melanoma ([facility] [address]     RT FOREARM    Migraine     Rheumatoid arthritis(714.0)     SVT (supraventricular tachycardia) ([facility] [address] 2018    Thyroid disease      Past Surgical History:   Procedure Laterality Date    HX BACK SURGERY      HX  SECTION  ,     HX CHOLECYSTECTOMY      HX HERNIA REPAIR      HX LITHOTRIPSY      HX MALIGNANT SKIN LESION EXCISION      HX TONSILLECTOMY      AS A CHILD    HX UROLOGICAL Bilateral     STENTS X 2       Prior Level of Function/Environment/Context: pt is independent at baseline. She completed all ADL and IADL activities. 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 Expanded or extensive additional review of patient history:   Home Situation  Home Environment: Private residence  # Steps to Enter: 0  One/Two Story Residence: Two story  # of Interior Steps: 15  Interior Rails: Right  Lift Chair Available: No  Living Alone: Yes  Support Systems: Family member(s)  Patient Expects to be Discharged to[de-identified] Private residence  Current DME Used/Available at Home: Samuella Land, rolling, Shower chair, Grab bars, Raised toilet seat  Tub or Shower Type: Shower    Hand dominance: Right    EXAMINATION OF PERFORMANCE DEFICITS:  Cognitive/Behavioral Status:  Neurologic State: Alert  Orientation Level: Oriented X4  Cognition: Appropriate for age attention/concentration  Perception: Appears intact  Perseveration: No perseveration noted  Safety/Judgement: Good awareness of safety precautions    Skin: see nursing notes    Edema: none noted    Hearing: Auditory  Auditory Impairment: None    Vision/Perceptual:                           Acuity: Within Defined Limits         Range of Motion:    AROM: Within functional limits  PROM: Within functional limits                      Strength:    Strength: Within functional limits                Coordination:  Coordination: Within functional limits  Fine Motor Skills-Upper: Right Intact; Left Intact    Gross Motor Skills-Upper: Right Intact; Left Intact    Tone & Sensation:    Tone: Normal  Sensation: Intact                      Balance:  Sitting: Intact  Standing: Intact; With support    Functional Mobility and Transfers for ADLs:  Bed Mobility:  Rolling: Supervision  Supine to Sit: Supervision  Sit to Supine: Supervision  Scooting: Supervision    Transfers:  Sit to Stand: Supervision  Stand to Sit: Supervision  Bed to Chair: Supervision  Bathroom Mobility: Supervision/set up  Toilet Transfer : Supervision    ADL Assessment:  Feeding: Supervision    Oral Facial Hygiene/Grooming: Supervision    Bathing: Minimum assistance    Upper Body Dressing: Supervision    Lower Body Dressing: Minimum assistance    Toileting: Supervision                ADL Intervention and task modifications:   IADL training:   Discussed at length precautions with IADL tasks. Discussed body alignment and ensuring pt does not twist hips/knees to ensure proper body alignment. Discussed finger tip rule for daily activities and to use a reacher for all tasks that are out of reach. Pt discussed to avoid tasks such as sweeping, mopping, vacuuming, changing bed linens, carrying a laundry basket, reaching into a low oven, or cleaning showers and toilets. Pt verbalized understanding of instructions. Did encourage pt to stand at sink for grooming, washing dishes, and light meal preparations to increase overall standing tolerance and independence with all activities. Lower Body Access:  Pt with posterior hip replacement and instructed to avoid extreme movements and allow pain to be the guide to complete lower body access. Recommend use of hip kit to complete lower body dressing to maximize independence and assist with pain control. Pt provided with education for proper  to access lower body with trunk flexion. Pt instructed with trunk flexion, both elbows and hands should reach between knees with hips externally rotated.   Pt is allowed to reach to lower body with arms surrounding one knee and never to have both hands/elbows on the outside of their knee causing flexion and internal rotation of the hip in a combined movement. Pt reports independence with training and education. Recommend pt to follow this instruction for 8-12 weeks until cleared by surgeon. Pt completed lower body dressing with min A for pants due to gripper socks, supervision for underpants, socks with min A , and provided demonstration for shoes. Cognitive Retraining  Safety/Judgement: Good awareness of safety precautions    Functional Measure:  Barthel Index:    Bathin  Bladder: 10  Bowels: 10  Groomin  Dressin  Feeding: 10  Mobility: 15  Stairs: 0  Toilet Use: 5  Transfer (Bed to Chair and Back): 15  Total: 75/100        The Barthel ADL Index: Guidelines  1. The index should be used as a record of what a patient does, not as a record of what a patient could do. 2. The main aim is to establish degree of independence from any help, physical or verbal, however minor and for whatever reason. 3. The need for supervision renders the patient not independent. 4. A patient's performance should be established using the best available evidence. Asking the patient, friends/relatives and nurses are the usual sources, but direct observation and common sense are also important. However direct testing is not needed. 5. Usually the patient's performance over the preceding 24-48 hours is important, but occasionally longer periods will be relevant. 6. Middle categories imply that the patient supplies over 50 per cent of the effort. 7. Use of aids to be independent is allowed. Humberto Tena., Barthel, D.W. ([id number]). Functional evaluation: the Barthel Index. [facility] (14)2. JOELLE Rome, Jesus Soto., Garrick Jasso., [place], [address] ().  Measuring the change indisability after inpatient rehabilitation; comparison of the responsiveness of the Barthel Index and Functional [place] Measure. Journal of Neurology, Neurosurgery, and Psychiatry, 664), 012-345. ROGER Storm, VIOLETTA Ferrer, & Toni Uriostegui M.A. (2004.) Assessment of post-stroke quality of life in cost-effectiveness studies: The usefulness of the Barthel Index and the EuroQoL-5D. Quality of Life Research, 15, 419-63         Occupational Therapy Evaluation Charge Determination   History Examination Decision-Making   LOW Complexity : Brief history review  LOW Complexity : 1-3 performance deficits relating to physical, cognitive , or psychosocial skils that result in activity limitations and / or participation restrictions  LOW Complexity : No comorbidities that affect functional and no verbal or physical assistance needed to complete eval tasks       Based on the above components, the patient evaluation is determined to be of the following complexity level: LOW   Pain Rating:  No pain reported    Activity Tolerance:   Good  Please refer to the flowsheet for vital signs taken during this treatment. After treatment patient left in no apparent distress:    Supine in bed and Call bell within reach    COMMUNICATION/EDUCATION:   The patients plan of care was discussed with: Physical Therapist and Registered Nurse.     Thank you for this referral.  Victor Manuel Lagos OT  Time Calculation: 36 mins

## 2019-12-11 NOTE — PROGRESS NOTES
Reason for Admission:    Right total hip arthroplasty                  RRAT Score:   14               Do you (patient/family) have any concerns for transition/discharge? No              Plan for utilizing home health:   Yes    Current Advanced Directive/Advance Care Plan:  Not on file. Transition of Care Plan:   CM met with pt to introduce her to the Cm role. Pt stated that she lives alone, but her son will be staying with her for a week after d/c. She said that she was independent prior to admission. CM informed pt that she has home health orders and offered her freedom of choice. ( Her [employer] is not accepted by many home health agencies. ). Per her request, CM sent referrals to [facility], [facility] and [facility]. [address] Management Interventions  PCP Verified by CM:  Yes  Palliative Care Criteria Met (RRAT>21 & CHF Dx)?: No  Transition of Care Consult (CM Consult): Discharge Planning  MyChart Signup: No  Discharge Durable Medical Equipment: No  Physical Therapy Consult: Yes  Speech Therapy Consult: No  Current Support Network: Lives Alone  Confirm Follow Up Transport: Family  Plan discussed with Pt/Family/Caregiver: Yes  Freedom of Choice Offered: Yes   Resource Information Provided?: No

## 2019-12-11 NOTE — PROGRESS NOTES
KRISTI- Home with Martin Knowles. CM received messages from [facility] and [facility] and they both denied being able to accept this pt for home health. CM sent a referral to [facility] via [facility] and they accepted this pt.  CM placed this information on pt's AVS. Poncho Dinero

## 2019-12-11 NOTE — PROGRESS NOTES
Problem: Mobility Impaired (Adult and Pediatric)  Goal: *Acute Goals and Plan of Care (Insert Text)  Description  FUNCTIONAL STATUS PRIOR TO ADMISSION: Patient was independent and active without use of DME.    HOME SUPPORT PRIOR TO ADMISSION: The patient lived alone with no local support. Physical Therapy Goals  Initiated 12/10/2019    1. Patient will move from supine to sit and sit to supine , scoot up and down and roll side to side in bed with modified independence within 4 days. 2. Patient will perform sit to stand with modified independence within 4 days. 3. Patient will ambulate with modified independence for 150 feet with the least restrictive device within 4 days. GOAL CANCELLED: 4. Patient will ascend/descend 4 stairs with cane and one handrail with modified independence within 4 days. Cancel: No steps to enter and will live on first floor. 5. Patient will perform post-ADELA home exercise program per protocol with independence within 4 days. Outcome: Resolved/Met   PHYSICAL THERAPY TREATMENT/DISCHARGE  Patient: Chanel Gardner (89 y.o. female)  Date: 12/11/2019  Diagnosis: Primary localized osteoarthritis of right hip [M16.11]   <principal problem not specified>  Procedure(s) (LRB):  RIGHT TOTAL HIP ARTHROPLASTY  (SPINAL W/IVS) (Right) 1 Day Post-Op  Precautions: Fall, WBAT  Chart, physical therapy assessment, plan of care and goals were reviewed. ASSESSMENT  Patient continues with skilled PT services and has met acute care PT goals. Patient is cleared for discharge from PT standpoint. She will benefit from [address] PT in order to achieve maximum level of safe, functional mobility, balance and return to independent PLOF. Educated patient on Discharge Instructions relating to PT program progression post-discharge. She demonstrated/verbalized understanding.      Barthel Functional Measure improved to 80/100 indicating minimal impaired ability to care for basic self-needs/dependency on others. Performed all mobility with stand by assistance. Ambulated 300 feet safely with RW. She is independent with post-op ADELA exercise program.    Other factors to consider for discharge: Motivated/A & O x 4/Supportive Family/Independent PLOF          PLAN :  Patient will be discharged from acute skilled physical therapy at this time. Rationale for discharge:  Goals achieved    Recommendation for discharge: (in order for the patient to meet his/her long term goals)  Physical therapy at least 2 days/week in the home     This discharge recommendation:  Has been made in collaboration with the attending provider and/or case management    IF patient discharges home will need the following DME: patient owns DME required for discharge       SUBJECTIVE:   Patient stated I am ready to roll.     OBJECTIVE DATA SUMMARY:   Critical Behavior:  Neurologic State: Alert  Orientation Level: Oriented X4  Cognition: Appropriate decision making, Appropriate for age attention/concentration, Appropriate safety awareness, Follows commands     Functional Mobility Training:  Bed Mobility:  Rolling: Stand-by assistance  Supine to Sit: Stand-by assistance  Sit to Supine: Stand-by assistance  Scooting: Stand-by assistance        Transfers:  Sit to Stand: Stand-by assistance  Stand to Sit: Stand-by assistance        Bed to Chair: Stand-by assistance                    Balance:  Sitting: Intact  Standing: Intact; With support  Ambulation/Gait Training:  Distance (ft): 300 Feet (ft)  Assistive Device: Walker, rolling;Gait belt  Ambulation - Level of Assistance: Stand-by assistance        Gait Abnormalities: Antalgic(step-through gait today; cues for neutral RLE )  Right Side Weight Bearing: As tolerated     Base of Support: Widened;Shift to left  Stance: Right decreased  Speed/Chary: Slow  Step Length: Left shortened  Swing Pattern: Right asymmetrical          Stairs - Level of Assistance: (NO STEPS TO ENTER HOME/LIVES ! ST FLOOR) Functional Measures:    Barthel Index:    Bathin  Bladder: 10  Bowels: 10  Groomin  Dressin  Feeding: 10  Mobility: 15  Stairs: 0  Toilet Use: 10  Transfer (Bed to Chair and Back): 15  Total: 80/100       The Barthel ADL Index: Guidelines  1. The index should be used as a record of what a patient does, not as a record of what a patient could do. 2. The main aim is to establish degree of independence from any help, physical or verbal, however minor and for whatever reason. 3. The need for supervision renders the patient not independent. 4. A patient's performance should be established using the best available evidence. Asking the patient, friends/relatives and nurses are the usual sources, but direct observation and common sense are also important. However direct testing is not needed. 5. Usually the patient's performance over the preceding 24-48 hours is important, but occasionally longer periods will be relevant. 6. Middle categories imply that the patient supplies over 50 per cent of the effort. 7. Use of aids to be independent is allowed. [place]., Barthel, D.W. ([id number]). Functional evaluation: the Barthel Index. [facility] (14)2. JOELLE Galindo, Dangelo Klein., Earnest Wallace., [place], [address] [place] [address] (). Measuring the change indisability after inpatient rehabilitation; comparison of the responsiveness of the Barthel Index and Functional [place] Measure. Journal of Neurology, Neurosurgery, and Psychiatry, 66(4), 063-314. Amanda Wilkinson, N.J.A, VIOLETTA Ferrer, & Ranae Lanes, M.A. (2004.) Assessment of post-stroke quality of life in cost-effectiveness studies: The usefulness of the Barthel Index and the EuroQoL-5D. Quality of Life Research, 15, 223-59      Therapeutic Exercises:   Patient attended pre-op JOINT CLASS, is independent with post-op ADELA exercise protocol and has same in written, illlustrated form.      Pain Ratin/20    Activity Tolerance:   Good      After treatment patient left in no apparent distress:   Sitting in chair, Call bell within reach, and nurse notified.      COMMUNICATION/COLLABORATION:   The patients plan of care was discussed with: Occupational Therapist, Registered Nurse, and     Per Soto   Time Calculation: 35 mins

## 2019-12-11 NOTE — PROGRESS NOTES
Bedside and Verbal shift change report given to Demetri Zimmer RN (oncoming nurse) by Ramon Trujillo RN (offgoing nurse). Report included the following information SBAR, Kardex, OR Summary, Procedure Summary, Intake/Output, MAR, Accordion and Recent Results.

## 2019-12-11 NOTE — PROGRESS NOTES
Orthopaedics Daily Progress Note                            Date of Surgery:  12/10/2019      Patient: Brianna Camilo   YOB: 1960  Age: 62 y.o. SUBJECTIVE:   1 Day Post-Op following RIGHT TOTAL HIP ARTHROPLASTY  (SPINAL W/IVS). The patient's post operative pain is well controlled. No CP/SOB. No N/V. The patient's mobility will be evaluated today during PT sessions. Ambulated yesterday into the ragland without issue. OBJECTIVE:     Vital Signs:      Visit Vitals  /66 (BP 1 Location: Right arm, BP Patient Position: At rest)   Pulse 72   Temp 98.2 °F (36.8 °C)   Resp 14   Ht 5' 8\" (1.727 m)   Wt 116.6 kg (257 lb)   SpO2 98%   BMI 39.08 kg/m²       Physical Exam:  General: A&Ox3. The patient is cooperative, and in no acute distress. Respiratory: Respirations are unlabored. Surgical site(s): dressing clean, dry  Musculoskeletal: Calves are soft, supple, and non-tender upon palpation. Motor 5/5. Neurological:  Neurovascularly intact with good dorsi and plantar flexion. Pulses symmetrical.    Laboratory Values:             Recent Results (from the past 12 hour(s))   METABOLIC PANEL, BASIC    Collection Time: 12/11/19  3:53 AM   Result Value Ref Range    Sodium 140 136 - 145 mmol/L    Potassium 4.4 3.5 - 5.1 mmol/L    Chloride 109 (H) 97 - 108 mmol/L    CO2 24 21 - 32 mmol/L    Anion gap 7 5 - 15 mmol/L    Glucose 133 (H) 65 - 100 mg/dL    BUN 24 (H) 6 - 20 MG/DL    Creatinine 0.99 0.55 - 1.02 MG/DL    BUN/Creatinine ratio 24 (H) 12 - 20      GFR est AA >60 >60 ml/min/1.73m2    GFR est non-AA 57 (L) >60 ml/min/1.73m2    Calcium 8.4 (L) 8.5 - 10.1 MG/DL   HEMOGLOBIN    Collection Time: 12/11/19  3:53 AM   Result Value Ref Range    HGB 10.5 (L) 11.5 - 16.0 g/dL         PLAN:     S/P RIGHT TOTAL HIP ARTHROPLASTY  (SPINAL W/IVS) -Continue WBAT. -Mobilize and continue with PT/OT until discharged     Hemodynamics Hgb today is 10.5. Acute blood loss anemia as expected.  Patient asymptomatic. Continue to monitor. Wound Monitor postop dressing; no postop dressing changes necessary. Reinforce PRN. Post Operative Pain Pain Control: stable, mild-to-moderate joint symptoms intermittently, reasonably well controlled by current meds. DVT Prophylaxis Continue with SCD'S, Ankle Pump Exercises. Eliquis     Discharge Disposition Discharge plan: Home with HHPT today.        Signed By: Cesar Tovar PA-C  December 11, 2019 8:14 AM

## 2019-12-11 NOTE — PROGRESS NOTES
Bedside and Verbal shift change report given to Mason Gao RN (oncoming nurse) by Dayne Clay RN (offgoing nurse). Report included the following information SBAR, Kardex, OR Summary, Intake/Output, MAR and Recent Results.

## 2019-12-11 NOTE — PROGRESS NOTES
RN contacted ELENITA Mccarthy because patient has diltiazem 240 mg due with /59. Per PA- hold medication.

## 2019-12-11 NOTE — OP NOTES
[facility]  OPERATIVE REPORT    Name:  Tony Duran  MR#:  [id number]  :  1960  ACCOUNT #:  [de-identified]  DATE OF SERVICE:  12/10/2019      PREOPERATIVE DIAGNOSIS:  Osteoarthritis, right hip. POSTOPERATIVE DIAGNOSIS:  Osteoarthritis, right hip. PROCEDURE PERFORMED:  Right total hip arthroplasty. SURGEON:  Debby Jha MD    ASSISTANT:  First assistant, Hue Ruiz PA-C    ANESTHESIA:  Spinal with sedation. COMPLICATIONS:  None. SPECIMENS REMOVED:  None. IMPLANTS:  Components implanted, DonJoy 56 mm FMP acetabular shell with 1 cancellous screw and 36 mm inside diameter neutral polyethylene liner, size 11 standard offset TaperFill femoral stem with 36 mm +4 ceramic femoral head. ESTIMATED BLOOD LOSS:  500 mL. INDICATIONS:  The patient is a 78-year-old female with progressive right hip and groin pain due to severe osteoarthritis. Symptoms have progressed despite comprehensive conservative treatment. She presents for right total hip replacement. Risks, benefits, alternatives of procedure were reviewed with her in detail and she desires to proceed. PROCEDURE IN DETAIL:  The patient was taken to the operating room, where anesthesia team placed a spinal.  Preoperative IV antibiotics were administered. She was turned to the left lateral decubitus position on a Bueno frame hip positioner. All bony prominences were well-padded and an axillary roll was placed. Right hip and thigh were prepped and draped in usual sterile fashion. Through a lateral hip incision, I performed a posterior approach to the right hip. Incision had to be extended a fair amount proximal and distal more due to the patient's obesity. Short rotators and posterior capsule were reflected posteriorly. Leg lengths were checked on the table for comparison with trial reduction later during the procedure. Hip was dislocated and femoral neck osteotomy was made.   Acetabular retractors were placed to achieve adequate exposure and retract the femur anteriorly and to release the gluteus aidee tendon. Remaining labrum was excised. Acetabulum was reamed with hemispherical reamers up to 55 mm before impacting a 56 mm FMP acetabular shell. Intrinsic stability was satisfactory that was augmented with 1 cancellous screw. A 36 neutral trial liner was placed. Remaining peripheral osteophytes were removed from the acetabulum anteriorly and anterior-superior and also inferiorly. Femur was prepared with TaperFill broaches up to size 11 before achieving rotational stability. Calcar was planed. Based on native anatomy, hip was reduced with a standard offset neck trial, 36 +4 head produced satisfactory leg length and soft tissue tension. Hip was stable to full extension, external rotation, but had some internal impingement posteriorly and rim of the polyethylene so I incorporated a few more degrees of relative retroversion and retrialed at this point without any internal impingement. Hip was stable anteriorly, stable to straight hyperflexion and with the hip flexed 90 degrees and neutral abduction, there was greater than 70-80 degrees of internal rotation. Trials were removed. The wound was copiously irrigated by pulse lavage before the real components were implanted. Same leg length and stability were achieved. It should be noted that overall lengthened the surgical side a total of approximately 7-8 mm, making the surgical side longer than the contralateral left side, where we plan to return at a later date for left hip replacement on that side. Periarticular soft tissues were injected with a solution containing 0.5% ropivacaine with epinephrine as well as clonidine and Toradol. Posterior capsule was repaired to the inner aspect of the posterior greater trochanter through drill holes using #2 Ethibond sutures.   Gluteus aidee tendon was repaired with heavy interrupted Vicryl sutures. Deep fascia was closed with a combination of heavy Vicryl sutures and a running #2 Stratafix suture. Skin and very thick subcutaneous layer were closed in multiple layers with Vicryl and a running Monocryl subcuticular stitch. The wound was dressed with Dermabond and an Aquacel occlusive dressing. The patient's bladder was decompressed with straight catheterization before she was transported to postanesthesia care unit in stable condition. All counts were correct at the end of the procedure. Note that the time to perform the procedure was approximately 50-75% longer than typical elected primary total hip replacement due to the patient's obesity, which made exposure and soft tissue management much more difficult and time consuming. The physician assistant was critical throughout the case to assist with positioning, retraction, and closure. There were no other available residents, fellows, or surgical assistants available to assist during this procedure.       Sanjana Foreman MD      [id number]  D:  12/10/2019 19:53  T:  12/11/2019 4:57  JOB #:  [id number]  CC:  MD Adin Flores MD

## 2020-06-09 ENCOUNTER — HOSPITAL ENCOUNTER (OUTPATIENT)
Dept: PREADMISSION TESTING | Age: 60
Discharge: HOME | End: 2020-06-09
Payer: COMMERCIAL

## 2020-06-09 VITALS
HEART RATE: 66 BPM | WEIGHT: 251.25 LBS | BODY MASS INDEX: 38.08 KG/M2 | DIASTOLIC BLOOD PRESSURE: 82 MMHG | HEIGHT: 68 IN | TEMPERATURE: 98 F | SYSTOLIC BLOOD PRESSURE: 136 MMHG

## 2020-06-09 LAB
ABO + RH BLD: NORMAL
ANION GAP SERPL CALC-SCNC: 5 MMOL/L (ref 5–15)
APPEARANCE UR: ABNORMAL
ATRIAL RATE: 62 BPM
BACTERIA URNS QL MICRO: NEGATIVE /HPF
BILIRUB UR QL: NEGATIVE
BLOOD GROUP ANTIBODIES SERPL: NORMAL
BUN SERPL-MCNC: 29 MG/DL (ref 6–20)
BUN/CREAT SERPL: 38 (ref 12–20)
CALCIUM SERPL-MCNC: 8.8 MG/DL (ref 8.5–10.1)
CALCULATED P AXIS, ECG09: 75 DEGREES
CALCULATED R AXIS, ECG10: 18 DEGREES
CALCULATED T AXIS, ECG11: 46 DEGREES
CAOX CRY URNS QL MICRO: ABNORMAL
CHLORIDE SERPL-SCNC: 106 MMOL/L (ref 97–108)
CO2 SERPL-SCNC: 29 MMOL/L (ref 21–32)
COLOR UR: ABNORMAL
CREAT SERPL-MCNC: 0.77 MG/DL (ref 0.55–1.02)
DIAGNOSIS, 93000: NORMAL
EPITH CASTS URNS QL MICRO: ABNORMAL /LPF
ERYTHROCYTE [DISTWIDTH] IN BLOOD BY AUTOMATED COUNT: 15.7 % (ref 11.5–14.5)
EST. AVERAGE GLUCOSE BLD GHB EST-MCNC: 123 MG/DL
GLUCOSE SERPL-MCNC: 88 MG/DL (ref 65–100)
GLUCOSE UR STRIP.AUTO-MCNC: NEGATIVE MG/DL
HBA1C MFR BLD: 5.9 % (ref 4–5.6)
HCT VFR BLD AUTO: 38.2 % (ref 35–47)
HGB BLD-MCNC: 12 G/DL (ref 11.5–16)
HGB UR QL STRIP: NEGATIVE
INR PPP: 1 (ref 0.9–1.1)
KETONES UR QL STRIP.AUTO: ABNORMAL MG/DL
LEUKOCYTE ESTERASE UR QL STRIP.AUTO: NEGATIVE
MCH RBC QN AUTO: 27.6 PG (ref 26–34)
MCHC RBC AUTO-ENTMCNC: 31.4 G/DL (ref 30–36.5)
MCV RBC AUTO: 87.8 FL (ref 80–99)
NITRITE UR QL STRIP.AUTO: NEGATIVE
NRBC # BLD: 0 K/UL (ref 0–0.01)
NRBC BLD-RTO: 0 PER 100 WBC
P-R INTERVAL, ECG05: 138 MS
PH UR STRIP: 5 [PH] (ref 5–8)
PLATELET # BLD AUTO: 287 K/UL (ref 150–400)
PMV BLD AUTO: 9.2 FL (ref 8.9–12.9)
POTASSIUM SERPL-SCNC: 4.3 MMOL/L (ref 3.5–5.1)
PROT UR STRIP-MCNC: NEGATIVE MG/DL
PROTHROMBIN TIME: 10.6 SEC (ref 9–11.1)
Q-T INTERVAL, ECG07: 466 MS
QRS DURATION, ECG06: 96 MS
QTC CALCULATION (BEZET), ECG08: 472 MS
RBC # BLD AUTO: 4.35 M/UL (ref 3.8–5.2)
RBC #/AREA URNS HPF: ABNORMAL /HPF (ref 0–5)
SODIUM SERPL-SCNC: 140 MMOL/L (ref 136–145)
SP GR UR REFRACTOMETRY: 1.02 (ref 1–1.03)
SPECIMEN EXP DATE BLD: NORMAL
UA: UC IF INDICATED,UAUC: ABNORMAL
UROBILINOGEN UR QL STRIP.AUTO: 0.2 EU/DL (ref 0.2–1)
VENTRICULAR RATE, ECG03: 62 BPM
WBC # BLD AUTO: 3.8 K/UL (ref 3.6–11)
WBC URNS QL MICRO: ABNORMAL /HPF (ref 0–4)

## 2020-06-09 PROCEDURE — 93005 ELECTROCARDIOGRAM TRACING: CPT

## 2020-06-09 PROCEDURE — 36415 COLL VENOUS BLD VENIPUNCTURE: CPT

## 2020-06-09 PROCEDURE — 81001 URINALYSIS AUTO W/SCOPE: CPT

## 2020-06-09 PROCEDURE — 83036 HEMOGLOBIN GLYCOSYLATED A1C: CPT

## 2020-06-09 PROCEDURE — 80048 BASIC METABOLIC PNL TOTAL CA: CPT

## 2020-06-09 PROCEDURE — 85027 COMPLETE CBC AUTOMATED: CPT

## 2020-06-09 PROCEDURE — 85610 PROTHROMBIN TIME: CPT

## 2020-06-09 PROCEDURE — 86900 BLOOD TYPING SEROLOGIC ABO: CPT

## 2020-06-09 RX ORDER — ASCORBIC ACID 250 MG
1 TABLET,CHEWABLE ORAL
COMMUNITY

## 2020-06-09 RX ORDER — ACETAMINOPHEN 500 MG
1000 TABLET ORAL ONCE
Status: CANCELLED | OUTPATIENT
Start: 2020-06-09 | End: 2020-06-09

## 2020-06-09 RX ORDER — CEFAZOLIN SODIUM/WATER 2 G/20 ML
2 SYRINGE (ML) INTRAVENOUS ONCE
Status: CANCELLED | OUTPATIENT
Start: 2020-06-16 | End: 2020-06-16

## 2020-06-09 RX ORDER — ETANERCEPT 50 MG/ML
50 SOLUTION SUBCUTANEOUS
COMMUNITY
End: 2020-06-17

## 2020-06-10 LAB
BACTERIA SPEC CULT: NORMAL
BACTERIA SPEC CULT: NORMAL
SERVICE CMNT-IMP: NORMAL

## 2020-06-11 NOTE — PERIOP NOTES
Faxed nares culture result to Dr. Chana Denis. Voicemail message left for Glo/Dr. Blanton's office re: nares culture positive for MSSA and will need treatment by surgeon's office.

## 2020-06-12 ENCOUNTER — HOSPITAL ENCOUNTER (OUTPATIENT)
Dept: PREADMISSION TESTING | Age: 60
Discharge: HOME | End: 2020-06-12
Payer: COMMERCIAL

## 2020-06-12 DIAGNOSIS — Z20.822 ENCOUNTER FOR LABORATORY TESTING FOR COVID-19 VIRUS: ICD-10-CM

## 2020-06-12 PROCEDURE — 87635 SARS-COV-2 COVID-19 AMP PRB: CPT

## 2020-06-13 LAB — SARS-COV-2, COV2NT: NOT DETECTED

## 2020-06-15 NOTE — DISCHARGE INSTRUCTIONS
Post-op Discharge Instructions Following Total Joint Replacement  Dominga Mchugh MD  [address]  [phone], ext [phone]  Follow-up Office Visit   See Dr. Yves Prasad approximately 3-4 weeks from date of surgery. Call [phone], ext [phone] to make an appointment. Activity   Use your walker for ambulation. Weight bearing as tolerated unless instructed otherwise by the physical therapist. Get up every hour you are awake and take a brief walk. Lengthen walking distance daily as your strength improves.  Continue using your walker until seen in the office for your first follow up visit.  Practice your exercises 3 times daily as instructed by the physical therapist. Galo Mcallister for 20 minutes after exercising.  No driving until seen in the office for your first follow up visit. Incision Care   The light brown Aquacel surgical dressing is waterproof and is to remain on your incision for 7 days. On the 7th day, carefully lift the edge of the dressing to break the adhesive seal and gently peel it off.  If your Aquacel dressings comes loose or falls off before the 7th day, replace it with a dry sterile gauze dressing and change this dressing daily. Once there is no drainage on the bandage, you mean leave the incision open to air.  You may take a shower with the Aquacel dressing in place. After you remove the Aquacel dressing on day 7, you may continue to shower and get your incision wet in the shower. Do not submerge your incision under water in a bathtub, hot tub, swimming pool, etc. until after you have been evaluated at your first office visit. Medications   Blood Clot Prevention: Take medication as prescribed by your physician for 4 weeks postop.  Pain Management: Take pain medication as prescribed; wean yourself off of pain medication as your pain lessens. Take with food.  You make also take Tylenol every 4-6 hours as needed for pain. Do not exceed 3 grams (3000mg) per day.    Place an ice bag on or around the incision for 20 minutes on / 20 minutes off as needed throughout the day and night, especially after exercising.  Stool Softener: You may want to take a stool softener (such as Senokot-S or Colace) to prevent constipation while taking pain medication. If constipation occurs, you may also use a laxative (such as Dulcolax tablets, Miralax, or a suppository). Diet   Resume usual diet at home. Drink plenty of fluids. Eat foods high in fiber and protein. Calcium and Vitamin D supplements recommended. Avoid alcoholic beverages. No smoking. When to call your Orthopaedic Surgeon: If you call after 5pm or on a weekend, the on call physician will return your call   Pain that is not relieved by pain medication, ice, and activity modification   Signs of infection (red incision, continuous drainage from the incision, malodorous drainage, persistent fever greater than 101 degrees Fahrenheit)   Signs of a blood clot in your leg (calf pain, tenderness, redness, and/or swelling of the lower leg)  ?   When to call your Primary Care Physician   Concerns about your medical conditions such as diabetes, high blood pressure, asthma, congestive heart failure   Call if blood sugars are elevated, if you have a persistent headache or dizziness, coughing or congestion, constipation or diarrhea, burning with urination, abnormal heart rate (fast or slow)  When to call 911 and go to the nearest Emergency Room   Acute onset of chest pain, shortness of breath, difficulty breathing

## 2020-06-16 ENCOUNTER — APPOINTMENT (OUTPATIENT)
Dept: GENERAL RADIOLOGY | Age: 60
DRG: 470 | End: 2020-06-16
Attending: PHYSICIAN ASSISTANT
Payer: COMMERCIAL

## 2020-06-16 ENCOUNTER — ANESTHESIA EVENT (OUTPATIENT)
Dept: SURGERY | Age: 60
DRG: 470 | End: 2020-06-16
Payer: COMMERCIAL

## 2020-06-16 ENCOUNTER — HOSPITAL ENCOUNTER (INPATIENT)
Age: 60
LOS: 1 days | Discharge: HOME HEALTH | DRG: 470 | End: 2020-06-17
Attending: ORTHOPAEDIC SURGERY | Admitting: ORTHOPAEDIC SURGERY
Payer: COMMERCIAL

## 2020-06-16 ENCOUNTER — ANESTHESIA (OUTPATIENT)
Dept: SURGERY | Age: 60
DRG: 470 | End: 2020-06-16
Payer: COMMERCIAL

## 2020-06-16 DIAGNOSIS — M16.12 PRIMARY OSTEOARTHRITIS OF LEFT HIP: Primary | ICD-10-CM

## 2020-06-16 PROCEDURE — 77030018723 HC ELCTRD BLD COVD -A: Performed by: ORTHOPAEDIC SURGERY

## 2020-06-16 PROCEDURE — 97161 PT EVAL LOW COMPLEX 20 MIN: CPT

## 2020-06-16 PROCEDURE — 74011250636 HC RX REV CODE- 250/636: Performed by: ORTHOPAEDIC SURGERY

## 2020-06-16 PROCEDURE — 73501 X-RAY EXAM HIP UNI 1 VIEW: CPT

## 2020-06-16 PROCEDURE — 77030018673: Performed by: ORTHOPAEDIC SURGERY

## 2020-06-16 PROCEDURE — 77030027138 HC INCENT SPIROMETER -A

## 2020-06-16 PROCEDURE — 0SRB04A REPLACEMENT OF LEFT HIP JOINT WITH CERAMIC ON POLYETHYLENE SYNTHETIC SUBSTITUTE, UNCEMENTED, OPEN APPROACH: ICD-10-PCS | Performed by: ORTHOPAEDIC SURGERY

## 2020-06-16 PROCEDURE — 74011250636 HC RX REV CODE- 250/636: Performed by: ANESTHESIOLOGY

## 2020-06-16 PROCEDURE — 77030005513 HC CATH URETH FOL11 MDII -B: Performed by: ORTHOPAEDIC SURGERY

## 2020-06-16 PROCEDURE — 77030040922 HC BLNKT HYPOTHRM STRY -A

## 2020-06-16 PROCEDURE — 76010000173 HC OR TIME 3 TO 3.5 HR INTENSV-TIER 1: Performed by: ORTHOPAEDIC SURGERY

## 2020-06-16 PROCEDURE — 74011250637 HC RX REV CODE- 250/637: Performed by: PHYSICIAN ASSISTANT

## 2020-06-16 PROCEDURE — 97116 GAIT TRAINING THERAPY: CPT

## 2020-06-16 PROCEDURE — 77030039825 HC MSK NSL PAP SUPERNO2VA VYRM -B: Performed by: ANESTHESIOLOGY

## 2020-06-16 PROCEDURE — 77030036660

## 2020-06-16 PROCEDURE — 74011000250 HC RX REV CODE- 250: Performed by: NURSE ANESTHETIST, CERTIFIED REGISTERED

## 2020-06-16 PROCEDURE — 77030014125 HC TY EPDRL BBMI -B: Performed by: ANESTHESIOLOGY

## 2020-06-16 PROCEDURE — 74011000250 HC RX REV CODE- 250: Performed by: PHYSICIAN ASSISTANT

## 2020-06-16 PROCEDURE — 77030031139 HC SUT VCRL2 J&J -A: Performed by: ORTHOPAEDIC SURGERY

## 2020-06-16 PROCEDURE — 97530 THERAPEUTIC ACTIVITIES: CPT

## 2020-06-16 PROCEDURE — 74011250637 HC RX REV CODE- 250/637: Performed by: ORTHOPAEDIC SURGERY

## 2020-06-16 PROCEDURE — 77030010507 HC ADH SKN DERMBND J&J -B: Performed by: ORTHOPAEDIC SURGERY

## 2020-06-16 PROCEDURE — 76210000006 HC OR PH I REC 0.5 TO 1 HR: Performed by: ORTHOPAEDIC SURGERY

## 2020-06-16 PROCEDURE — 77030018547 HC SUT ETHBND1 J&J -B: Performed by: ORTHOPAEDIC SURGERY

## 2020-06-16 PROCEDURE — 77030018846 HC SOL IRR STRL H20 ICUM -A: Performed by: ORTHOPAEDIC SURGERY

## 2020-06-16 PROCEDURE — 77030003882 HC BIT DRL TWST BRSM -B: Performed by: ORTHOPAEDIC SURGERY

## 2020-06-16 PROCEDURE — C1776 JOINT DEVICE (IMPLANTABLE): HCPCS | Performed by: ORTHOPAEDIC SURGERY

## 2020-06-16 PROCEDURE — 77030033067 HC SUT PDO STRATFX SPIR J&J -B: Performed by: ORTHOPAEDIC SURGERY

## 2020-06-16 PROCEDURE — 77030041397 HC DRSG PRIMASEAL AG MDII -B: Performed by: ORTHOPAEDIC SURGERY

## 2020-06-16 PROCEDURE — 74011250636 HC RX REV CODE- 250/636: Performed by: PHYSICIAN ASSISTANT

## 2020-06-16 PROCEDURE — 77030006822 HC BLD SAW SAG BRSM -B: Performed by: ORTHOPAEDIC SURGERY

## 2020-06-16 PROCEDURE — 77030018836 HC SOL IRR NACL ICUM -A: Performed by: ORTHOPAEDIC SURGERY

## 2020-06-16 PROCEDURE — 74011000250 HC RX REV CODE- 250: Performed by: ORTHOPAEDIC SURGERY

## 2020-06-16 PROCEDURE — 74011000258 HC RX REV CODE- 258: Performed by: NURSE ANESTHETIST, CERTIFIED REGISTERED

## 2020-06-16 PROCEDURE — 74011250636 HC RX REV CODE- 250/636: Performed by: NURSE ANESTHETIST, CERTIFIED REGISTERED

## 2020-06-16 PROCEDURE — 65270000029 HC RM PRIVATE

## 2020-06-16 PROCEDURE — 76060000037 HC ANESTHESIA 3 TO 3.5 HR: Performed by: ORTHOPAEDIC SURGERY

## 2020-06-16 PROCEDURE — 77030018074 HC RTVR SUT ARTH4 S&N -B: Performed by: ORTHOPAEDIC SURGERY

## 2020-06-16 PROCEDURE — 77030002933 HC SUT MCRYL J&J -A: Performed by: ORTHOPAEDIC SURGERY

## 2020-06-16 PROCEDURE — 77030011640 HC PAD GRND REM COVD -A: Performed by: ORTHOPAEDIC SURGERY

## 2020-06-16 DEVICE — COMPONENT HIP PRSS FT H1 CERM ON CERM POLYETH: Type: IMPLANTABLE DEVICE | Status: FUNCTIONAL

## 2020-06-16 DEVICE — EMPOWR ACET SYSTEM, CUP, HEMISPHERICAL, CLUSTER HOLE, 56MM
Type: IMPLANTABLE DEVICE | Site: HIP | Status: FUNCTIONAL
Brand: DJO SURGICAL

## 2020-06-16 DEVICE — EMPOWR ACETABULAR SYSTEM, LINER, NEUTRAL, HXE+, 36H
Type: IMPLANTABLE DEVICE | Site: HIP | Status: FUNCTIONAL
Brand: DJO SURGICAL

## 2020-06-16 DEVICE — HEAD, FEMORAL, CERAMIC, BILOX DELTA, 36MM +4.0
Type: IMPLANTABLE DEVICE | Site: HIP | Status: FUNCTIONAL
Brand: DJO SURGICAL

## 2020-06-16 DEVICE — EMPOWR ACETABULAR, BONE SCREW, 40MM
Type: IMPLANTABLE DEVICE | Site: HIP | Status: FUNCTIONAL
Brand: DJO SURGICAL

## 2020-06-16 DEVICE — TAPERFILL HIP STEM, STANDARD, SIZE 10
Type: IMPLANTABLE DEVICE | Site: HIP | Status: FUNCTIONAL
Brand: DJO SURGICAL

## 2020-06-16 RX ORDER — EPHEDRINE SULFATE/0.9% NACL/PF 50 MG/5 ML
5 SYRINGE (ML) INTRAVENOUS AS NEEDED
Status: DISCONTINUED | OUTPATIENT
Start: 2020-06-16 | End: 2020-06-16 | Stop reason: HOSPADM

## 2020-06-16 RX ORDER — CEFAZOLIN SODIUM/WATER 2 G/20 ML
2 SYRINGE (ML) INTRAVENOUS ONCE
Status: COMPLETED | OUTPATIENT
Start: 2020-06-16 | End: 2020-06-16

## 2020-06-16 RX ORDER — DEXAMETHASONE SODIUM PHOSPHATE 4 MG/ML
INJECTION, SOLUTION INTRA-ARTICULAR; INTRALESIONAL; INTRAMUSCULAR; INTRAVENOUS; SOFT TISSUE AS NEEDED
Status: DISCONTINUED | OUTPATIENT
Start: 2020-06-16 | End: 2020-06-16 | Stop reason: HOSPADM

## 2020-06-16 RX ORDER — SODIUM CHLORIDE 0.9 % (FLUSH) 0.9 %
5-40 SYRINGE (ML) INJECTION EVERY 8 HOURS
Status: DISCONTINUED | OUTPATIENT
Start: 2020-06-16 | End: 2020-06-17 | Stop reason: HOSPADM

## 2020-06-16 RX ORDER — FACIAL-BODY WIPES
10 EACH TOPICAL DAILY PRN
Status: DISCONTINUED | OUTPATIENT
Start: 2020-06-17 | End: 2020-06-17 | Stop reason: HOSPADM

## 2020-06-16 RX ORDER — HYDROMORPHONE HYDROCHLORIDE 1 MG/ML
0.2 INJECTION, SOLUTION INTRAMUSCULAR; INTRAVENOUS; SUBCUTANEOUS
Status: DISCONTINUED | OUTPATIENT
Start: 2020-06-16 | End: 2020-06-16 | Stop reason: HOSPADM

## 2020-06-16 RX ORDER — ACETAMINOPHEN 325 MG/1
650 TABLET ORAL ONCE
Status: DISCONTINUED | OUTPATIENT
Start: 2020-06-16 | End: 2020-06-16 | Stop reason: HOSPADM

## 2020-06-16 RX ORDER — LIDOCAINE HYDROCHLORIDE 20 MG/ML
INJECTION, SOLUTION EPIDURAL; INFILTRATION; INTRACAUDAL; PERINEURAL AS NEEDED
Status: DISCONTINUED | OUTPATIENT
Start: 2020-06-16 | End: 2020-06-16 | Stop reason: HOSPADM

## 2020-06-16 RX ORDER — FENTANYL CITRATE 50 UG/ML
25 INJECTION, SOLUTION INTRAMUSCULAR; INTRAVENOUS
Status: COMPLETED | OUTPATIENT
Start: 2020-06-16 | End: 2020-06-16

## 2020-06-16 RX ORDER — SODIUM CHLORIDE, SODIUM LACTATE, POTASSIUM CHLORIDE, CALCIUM CHLORIDE 600; 310; 30; 20 MG/100ML; MG/100ML; MG/100ML; MG/100ML
100 INJECTION, SOLUTION INTRAVENOUS CONTINUOUS
Status: DISCONTINUED | OUTPATIENT
Start: 2020-06-16 | End: 2020-06-16 | Stop reason: HOSPADM

## 2020-06-16 RX ORDER — ACETAMINOPHEN 500 MG
500 TABLET ORAL EVERY 4 HOURS
Qty: 100 TAB | Refills: 0 | Status: SHIPPED
Start: 2020-06-16

## 2020-06-16 RX ORDER — OXYCODONE HYDROCHLORIDE 5 MG/1
2.5-5 TABLET ORAL
Qty: 42 TAB | Refills: 0 | Status: SHIPPED | OUTPATIENT
Start: 2020-06-16 | End: 2020-06-23

## 2020-06-16 RX ORDER — BUPIVACAINE HYDROCHLORIDE 5 MG/ML
INJECTION, SOLUTION EPIDURAL; INTRACAUDAL AS NEEDED
Status: DISCONTINUED | OUTPATIENT
Start: 2020-06-16 | End: 2020-06-16 | Stop reason: HOSPADM

## 2020-06-16 RX ORDER — OXYCODONE HYDROCHLORIDE 5 MG/1
2.5 TABLET ORAL
Status: DISCONTINUED | OUTPATIENT
Start: 2020-06-16 | End: 2020-06-17 | Stop reason: HOSPADM

## 2020-06-16 RX ORDER — SODIUM CHLORIDE, SODIUM LACTATE, POTASSIUM CHLORIDE, CALCIUM CHLORIDE 600; 310; 30; 20 MG/100ML; MG/100ML; MG/100ML; MG/100ML
1000 INJECTION, SOLUTION INTRAVENOUS CONTINUOUS
Status: DISCONTINUED | OUTPATIENT
Start: 2020-06-16 | End: 2020-06-16 | Stop reason: HOSPADM

## 2020-06-16 RX ORDER — POLYETHYLENE GLYCOL 3350 17 G/17G
17 POWDER, FOR SOLUTION ORAL DAILY
Status: DISCONTINUED | OUTPATIENT
Start: 2020-06-17 | End: 2020-06-17 | Stop reason: HOSPADM

## 2020-06-16 RX ORDER — METHOTREXATE 2.5 MG/1
20 TABLET ORAL
Status: DISCONTINUED | OUTPATIENT
Start: 2020-06-22 | End: 2020-06-17 | Stop reason: HOSPADM

## 2020-06-16 RX ORDER — SODIUM CHLORIDE 9 MG/ML
25 INJECTION, SOLUTION INTRAVENOUS CONTINUOUS
Status: DISCONTINUED | OUTPATIENT
Start: 2020-06-16 | End: 2020-06-16 | Stop reason: HOSPADM

## 2020-06-16 RX ORDER — DULOXETIN HYDROCHLORIDE 60 MG/1
60 CAPSULE, DELAYED RELEASE ORAL
Status: DISCONTINUED | OUTPATIENT
Start: 2020-06-17 | End: 2020-06-17 | Stop reason: HOSPADM

## 2020-06-16 RX ORDER — PROPOFOL 10 MG/ML
INJECTION, EMULSION INTRAVENOUS
Status: DISCONTINUED | OUTPATIENT
Start: 2020-06-16 | End: 2020-06-16 | Stop reason: HOSPADM

## 2020-06-16 RX ORDER — MIDAZOLAM HYDROCHLORIDE 1 MG/ML
0.5 INJECTION, SOLUTION INTRAMUSCULAR; INTRAVENOUS
Status: DISCONTINUED | OUTPATIENT
Start: 2020-06-16 | End: 2020-06-16 | Stop reason: HOSPADM

## 2020-06-16 RX ORDER — MORPHINE SULFATE 2 MG/ML
2 INJECTION, SOLUTION INTRAMUSCULAR; INTRAVENOUS
Status: DISCONTINUED | OUTPATIENT
Start: 2020-06-16 | End: 2020-06-16 | Stop reason: HOSPADM

## 2020-06-16 RX ORDER — ONDANSETRON 2 MG/ML
4 INJECTION INTRAMUSCULAR; INTRAVENOUS
Status: DISCONTINUED | OUTPATIENT
Start: 2020-06-16 | End: 2020-06-17 | Stop reason: HOSPADM

## 2020-06-16 RX ORDER — MIDAZOLAM HYDROCHLORIDE 1 MG/ML
1 INJECTION, SOLUTION INTRAMUSCULAR; INTRAVENOUS AS NEEDED
Status: DISCONTINUED | OUTPATIENT
Start: 2020-06-16 | End: 2020-06-16 | Stop reason: HOSPADM

## 2020-06-16 RX ORDER — DIPHENHYDRAMINE HYDROCHLORIDE 50 MG/ML
12.5 INJECTION, SOLUTION INTRAMUSCULAR; INTRAVENOUS AS NEEDED
Status: DISCONTINUED | OUTPATIENT
Start: 2020-06-16 | End: 2020-06-16 | Stop reason: HOSPADM

## 2020-06-16 RX ORDER — HYDROXYZINE HYDROCHLORIDE 10 MG/1
10 TABLET, FILM COATED ORAL
Status: DISCONTINUED | OUTPATIENT
Start: 2020-06-16 | End: 2020-06-17 | Stop reason: HOSPADM

## 2020-06-16 RX ORDER — ACETAMINOPHEN 500 MG
1000 TABLET ORAL ONCE
Status: COMPLETED | OUTPATIENT
Start: 2020-06-16 | End: 2020-06-16

## 2020-06-16 RX ORDER — OXYCODONE HYDROCHLORIDE 5 MG/1
5 TABLET ORAL
Status: DISCONTINUED | OUTPATIENT
Start: 2020-06-16 | End: 2020-06-17 | Stop reason: HOSPADM

## 2020-06-16 RX ORDER — SODIUM CHLORIDE 9 MG/ML
125 INJECTION, SOLUTION INTRAVENOUS CONTINUOUS
Status: DISCONTINUED | OUTPATIENT
Start: 2020-06-16 | End: 2020-06-17 | Stop reason: HOSPADM

## 2020-06-16 RX ORDER — MIDAZOLAM HYDROCHLORIDE 1 MG/ML
INJECTION, SOLUTION INTRAMUSCULAR; INTRAVENOUS AS NEEDED
Status: DISCONTINUED | OUTPATIENT
Start: 2020-06-16 | End: 2020-06-16 | Stop reason: HOSPADM

## 2020-06-16 RX ORDER — PROPOFOL 10 MG/ML
INJECTION, EMULSION INTRAVENOUS AS NEEDED
Status: DISCONTINUED | OUTPATIENT
Start: 2020-06-16 | End: 2020-06-16 | Stop reason: HOSPADM

## 2020-06-16 RX ORDER — ASPIRIN 81 MG/1
81 TABLET ORAL 2 TIMES DAILY
Status: DISCONTINUED | OUTPATIENT
Start: 2020-06-16 | End: 2020-06-17 | Stop reason: HOSPADM

## 2020-06-16 RX ORDER — AMOXICILLIN 250 MG
1 CAPSULE ORAL 2 TIMES DAILY
Status: DISCONTINUED | OUTPATIENT
Start: 2020-06-16 | End: 2020-06-17 | Stop reason: HOSPADM

## 2020-06-16 RX ORDER — LIDOCAINE HYDROCHLORIDE 10 MG/ML
0.1 INJECTION, SOLUTION EPIDURAL; INFILTRATION; INTRACAUDAL; PERINEURAL AS NEEDED
Status: DISCONTINUED | OUTPATIENT
Start: 2020-06-16 | End: 2020-06-16 | Stop reason: HOSPADM

## 2020-06-16 RX ORDER — ACETAMINOPHEN 500 MG
500 TABLET ORAL EVERY 4 HOURS
Status: DISCONTINUED | OUTPATIENT
Start: 2020-06-16 | End: 2020-06-17 | Stop reason: HOSPADM

## 2020-06-16 RX ORDER — LEVOTHYROXINE SODIUM 125 UG/1
125 TABLET ORAL
Status: DISCONTINUED | OUTPATIENT
Start: 2020-06-16 | End: 2020-06-17 | Stop reason: HOSPADM

## 2020-06-16 RX ORDER — ONDANSETRON 2 MG/ML
4 INJECTION INTRAMUSCULAR; INTRAVENOUS AS NEEDED
Status: DISCONTINUED | OUTPATIENT
Start: 2020-06-16 | End: 2020-06-16 | Stop reason: HOSPADM

## 2020-06-16 RX ORDER — FENTANYL CITRATE 50 UG/ML
50 INJECTION, SOLUTION INTRAMUSCULAR; INTRAVENOUS AS NEEDED
Status: DISCONTINUED | OUTPATIENT
Start: 2020-06-16 | End: 2020-06-16 | Stop reason: HOSPADM

## 2020-06-16 RX ORDER — NALOXONE HYDROCHLORIDE 0.4 MG/ML
0.4 INJECTION, SOLUTION INTRAMUSCULAR; INTRAVENOUS; SUBCUTANEOUS AS NEEDED
Status: DISCONTINUED | OUTPATIENT
Start: 2020-06-16 | End: 2020-06-17 | Stop reason: HOSPADM

## 2020-06-16 RX ORDER — AMOXICILLIN 250 MG
1 CAPSULE ORAL
Qty: 60 TAB | Refills: 0 | Status: SHIPPED | OUTPATIENT
Start: 2020-06-16 | End: 2021-08-04

## 2020-06-16 RX ORDER — HYDROMORPHONE HYDROCHLORIDE 1 MG/ML
0.5 INJECTION, SOLUTION INTRAMUSCULAR; INTRAVENOUS; SUBCUTANEOUS
Status: DISCONTINUED | OUTPATIENT
Start: 2020-06-16 | End: 2020-06-17 | Stop reason: HOSPADM

## 2020-06-16 RX ORDER — KETOROLAC TROMETHAMINE 30 MG/ML
15 INJECTION, SOLUTION INTRAMUSCULAR; INTRAVENOUS EVERY 6 HOURS
Status: COMPLETED | OUTPATIENT
Start: 2020-06-16 | End: 2020-06-17

## 2020-06-16 RX ORDER — ASPIRIN 81 MG/1
81 TABLET ORAL 2 TIMES DAILY
Qty: 60 TAB | Refills: 0 | Status: SHIPPED | OUTPATIENT
Start: 2020-06-16

## 2020-06-16 RX ORDER — OXYCODONE HYDROCHLORIDE 5 MG/1
5 TABLET ORAL AS NEEDED
Status: DISCONTINUED | OUTPATIENT
Start: 2020-06-16 | End: 2020-06-16 | Stop reason: HOSPADM

## 2020-06-16 RX ORDER — SODIUM CHLORIDE 0.9 % (FLUSH) 0.9 %
5-40 SYRINGE (ML) INJECTION AS NEEDED
Status: DISCONTINUED | OUTPATIENT
Start: 2020-06-16 | End: 2020-06-17 | Stop reason: HOSPADM

## 2020-06-16 RX ORDER — DILTIAZEM HYDROCHLORIDE 120 MG/1
240 CAPSULE, COATED, EXTENDED RELEASE ORAL
Status: DISCONTINUED | OUTPATIENT
Start: 2020-06-17 | End: 2020-06-17 | Stop reason: HOSPADM

## 2020-06-16 RX ORDER — ROPIVACAINE HYDROCHLORIDE 5 MG/ML
150 INJECTION, SOLUTION EPIDURAL; INFILTRATION; PERINEURAL AS NEEDED
Status: DISCONTINUED | OUTPATIENT
Start: 2020-06-16 | End: 2020-06-16 | Stop reason: HOSPADM

## 2020-06-16 RX ORDER — ONDANSETRON 2 MG/ML
INJECTION INTRAMUSCULAR; INTRAVENOUS AS NEEDED
Status: DISCONTINUED | OUTPATIENT
Start: 2020-06-16 | End: 2020-06-16 | Stop reason: HOSPADM

## 2020-06-16 RX ORDER — CEFAZOLIN SODIUM/WATER 2 G/20 ML
2 SYRINGE (ML) INTRAVENOUS EVERY 8 HOURS
Status: COMPLETED | OUTPATIENT
Start: 2020-06-16 | End: 2020-06-17

## 2020-06-16 RX ADMIN — PROPOFOL 75 MCG/KG/MIN: 10 INJECTION, EMULSION INTRAVENOUS at 09:28

## 2020-06-16 RX ADMIN — SODIUM CHLORIDE, POTASSIUM CHLORIDE, SODIUM LACTATE AND CALCIUM CHLORIDE: 600; 310; 30; 20 INJECTION, SOLUTION INTRAVENOUS at 11:35

## 2020-06-16 RX ADMIN — BUPIVACAINE HYDROCHLORIDE 10 MG: 5 INJECTION, SOLUTION EPIDURAL; INTRACAUDAL; PERINEURAL at 08:55

## 2020-06-16 RX ADMIN — OXYCODONE 5 MG: 5 TABLET ORAL at 21:50

## 2020-06-16 RX ADMIN — SODIUM CHLORIDE 125 ML/HR: 900 INJECTION, SOLUTION INTRAVENOUS at 19:22

## 2020-06-16 RX ADMIN — ASPIRIN 81 MG: 81 TABLET, COATED ORAL at 17:16

## 2020-06-16 RX ADMIN — ACETAMINOPHEN 1000 MG: 500 TABLET ORAL at 07:55

## 2020-06-16 RX ADMIN — MIDAZOLAM 2 MG: 1 INJECTION INTRAMUSCULAR; INTRAVENOUS at 08:47

## 2020-06-16 RX ADMIN — DOCUSATE SODIUM 50MG AND SENNOSIDES 8.6MG 1 TABLET: 8.6; 5 TABLET, FILM COATED ORAL at 17:17

## 2020-06-16 RX ADMIN — ACETAMINOPHEN 500 MG: 500 TABLET ORAL at 17:16

## 2020-06-16 RX ADMIN — TRANEXAMIC ACID 1 G: 100 INJECTION, SOLUTION INTRAVENOUS at 09:21

## 2020-06-16 RX ADMIN — KETOROLAC TROMETHAMINE 15 MG: 30 INJECTION, SOLUTION INTRAMUSCULAR at 12:11

## 2020-06-16 RX ADMIN — HYDROMORPHONE HYDROCHLORIDE 0.2 MG: 1 INJECTION, SOLUTION INTRAMUSCULAR; INTRAVENOUS; SUBCUTANEOUS at 12:12

## 2020-06-16 RX ADMIN — DEXAMETHASONE SODIUM PHOSPHATE 4 MG: 4 INJECTION, SOLUTION INTRAMUSCULAR; INTRAVENOUS at 08:55

## 2020-06-16 RX ADMIN — FENTANYL CITRATE 25 MCG: 50 INJECTION INTRAMUSCULAR; INTRAVENOUS at 12:25

## 2020-06-16 RX ADMIN — Medication 2 G: at 09:10

## 2020-06-16 RX ADMIN — CEFAZOLIN SODIUM 2 G: 300 INJECTION, POWDER, LYOPHILIZED, FOR SOLUTION INTRAVENOUS at 17:16

## 2020-06-16 RX ADMIN — KETOROLAC TROMETHAMINE 15 MG: 30 INJECTION, SOLUTION INTRAMUSCULAR at 17:16

## 2020-06-16 RX ADMIN — PROPOFOL 100 MCG/KG/MIN: 10 INJECTION, EMULSION INTRAVENOUS at 08:53

## 2020-06-16 RX ADMIN — LEVOTHYROXINE SODIUM 125 MCG: 0.12 TABLET ORAL at 21:50

## 2020-06-16 RX ADMIN — PHENYLEPHRINE HYDROCHLORIDE 20 MCG/MIN: 10 INJECTION INTRAVENOUS at 09:32

## 2020-06-16 RX ADMIN — PROPOFOL 40 MG: 10 INJECTION, EMULSION INTRAVENOUS at 11:24

## 2020-06-16 RX ADMIN — HYDROMORPHONE HYDROCHLORIDE 0.2 MG: 1 INJECTION, SOLUTION INTRAMUSCULAR; INTRAVENOUS; SUBCUTANEOUS at 12:32

## 2020-06-16 RX ADMIN — FENTANYL CITRATE 25 MCG: 50 INJECTION INTRAMUSCULAR; INTRAVENOUS at 12:16

## 2020-06-16 RX ADMIN — OXYCODONE 5 MG: 5 TABLET ORAL at 14:28

## 2020-06-16 RX ADMIN — SODIUM CHLORIDE, POTASSIUM CHLORIDE, SODIUM LACTATE AND CALCIUM CHLORIDE: 600; 310; 30; 20 INJECTION, SOLUTION INTRAVENOUS at 08:29

## 2020-06-16 RX ADMIN — PROPOFOL 40 MG: 10 INJECTION, EMULSION INTRAVENOUS at 08:53

## 2020-06-16 RX ADMIN — ACETAMINOPHEN 500 MG: 500 TABLET ORAL at 21:50

## 2020-06-16 RX ADMIN — ONDANSETRON HYDROCHLORIDE 4 MG: 2 INJECTION, SOLUTION INTRAMUSCULAR; INTRAVENOUS at 08:53

## 2020-06-16 RX ADMIN — FENTANYL CITRATE 25 MCG: 50 INJECTION INTRAMUSCULAR; INTRAVENOUS at 12:30

## 2020-06-16 RX ADMIN — ACETAMINOPHEN 500 MG: 500 TABLET ORAL at 14:26

## 2020-06-16 RX ADMIN — FENTANYL CITRATE 25 MCG: 50 INJECTION INTRAMUSCULAR; INTRAVENOUS at 12:11

## 2020-06-16 RX ADMIN — LIDOCAINE HYDROCHLORIDE 40 MG: 20 INJECTION, SOLUTION EPIDURAL; INFILTRATION; INTRACAUDAL; PERINEURAL at 08:53

## 2020-06-16 NOTE — ANESTHESIA PROCEDURE NOTES
Spinal Block    Start time: 6/16/2020 8:51 AM  End time: 6/16/2020 8:55 AM  Performed by: Rabia Warren MD  Authorized by: Rabia Warren MD     Pre-procedure:   Indications: primary anesthetic  Preanesthetic Checklist: patient identified, risks and benefits discussed, anesthesia consent, site marked, patient being monitored and timeout performed    Timeout Time: 08:51          Spinal Block:   Patient Position:  Seated  Prep Region:  Lumbar  Prep: DuraPrep and patient draped      Location:  L3-4  Technique:  Single shot        Needle:   Needle Type:  Pencil-tip  Needle Gauge:  25 G  Attempts:  1      Events: CSF confirmed, no blood with aspiration and no paresthesia        Assessment:  Insertion:  Uncomplicated  Patient tolerance:  Patient tolerated the procedure well with no immediate complications

## 2020-06-16 NOTE — PROGRESS NOTES
Ortho Post-Op Note    6/16/2020  2:05 PM    POD:  Day of Surgery  S/P:  Procedure(s):  LEFT TOTAL HIP ARTHROPLASTY (SPINAL WITH IV SED)    Afebrile/VSS, NAD, A&O x3  Doing well without complaints of nausea  Pain well controlled  Calves soft/NTTP Bilaterally  Thigh soft. Dressing clean and dry  Moving lower extremities well. Neurocirculatory exam intact and within normal range. Lab Results   Component Value Date/Time    HGB 12.0 06/09/2020 11:48 AM    INR 1.0 06/09/2020 11:48 AM     Recent Labs     06/09/20  1148 12/11/19  0353 11/07/19  0852   CREA 0.77 0.99 0.81   BUN 29* 24* 27*     Estimated Creatinine Clearance: 101.4 mL/min (by C-G formula based on SCr of 0.77 mg/dL).     PLAN:  DVT prophylaxis: ASA 81 mg bid  WBAT with PT-mobilization  Pain Control: Tylenol, toradol, oxycodone  Plan to D/C home likely tomorrow after PT      ELENITA Lees

## 2020-06-16 NOTE — PROGRESS NOTES
Problem: Mobility Impaired (Adult and Pediatric)  Goal: *Acute Goals and Plan of Care (Insert Text)  Description: FUNCTIONAL STATUS PRIOR TO ADMISSION: Patient was independent and active without use of DME.    HOME SUPPORT PRIOR TO ADMISSION: The patient lived with  but did not require assist.    Physical Therapy Goals  Initiated 6/16/2020    1. Patient will move from supine to sit and sit to supine , scoot up and down and roll side to side in bed with modified independence within 4 days. 2. Patient will perform sit to stand with modified independence within 4 days. 3. Patient will ambulate with modified independence for 150 feet with the least restrictive device within 4 days. 4. Patient will ascend/descend 4 stairs with both handrail(s) with modified independence within 4 days. 5. Patient will perform post-ADELA home exercise program per protocol with independence within 4 days. Outcome: Progressing Towards Goal   PHYSICAL THERAPY EVALUATION  Patient: Maegan Altamirano (89 y.o. female)  Date: 6/16/2020  Primary Diagnosis: Osteoarthritis of left hip [M16.12]  Procedure(s) (LRB):  LEFT TOTAL HIP ARTHROPLASTY (SPINAL WITH IV SED) (Left) Day of Surgery   Precautions:   Fall, WBAT      ASSESSMENT  Based on the objective data described below, the patient presents with  impairment in functional mobility, activity tolerance and balance s/p L ADELA. (Had R ADELA several months ago). PLOF: Independent with ADLs and IADLs. Lives with  on first floor of 2 story home (2nd floor is optional: guest bedrooms), no steps to enter. Patient's mobility was on target for POD#0. Will address more exercises, increase gait distance, negotiate stairs and assess for discharge at am PT session tomorrow. Patient instructed NOT to get up from bed, chair or commode without calling for assistance. Patient is independent with post-op ADELA exercise protocol and has same in written, illlustrated form.  Anticipate discharge tomorrow am.    Current Level of Function Impacting Discharge (mobility/balance): Contact guard assistance for all mobility. Walked 72 ft with RW and gait belt, slightly antalgic but steady. Functional Outcome Measure: The patient scored 50/100 on the Barthel outcome measure which is indicative of moderate impaired ability to care for basic self-needs/dependency on others. Other factors to consider for discharge: Motivated/A & O x 4/Supportive Family/Independent PLOF      Patient will benefit from skilled therapy intervention to address the above noted impairments. PLAN :  Recommendations and Planned Interventions: bed mobility training, transfer training, gait training, therapeutic exercises, patient and family training/education, and therapeutic activities      Frequency/Duration: Patient will be followed by physical therapy:  twice daily to address goals. Recommendation for discharge: (in order for the patient to meet his/her long term goals)  Physical therapy at least 2 days/week in the home     This discharge recommendation:  Has been made in collaboration with the attending provider and/or case management    IF patient discharges home will need the following DME: patient owns DME required for discharge         SUBJECTIVE:   Patient stated I am feeling well.     OBJECTIVE DATA SUMMARY:   HISTORY:    Past Medical History:   Diagnosis Date    Arthritis     RHEUMATOID    Atrial fibrillation ([facility] [address] 2018    Autoimmune disease ([facility] [address]     RHEUMATOID ARTHRITIS    Chronic pain     HIP    Kidney stones     Melanoma ([facility] [address]     RT FOREARM    Migraine     Rheumatoid arthritis(714.0)     SVT (supraventricular tachycardia) ([facility] [address] 2018    Thyroid disease     HYPOTHYROIDISM     Past Surgical History:   Procedure Laterality Date    HX BACK SURGERY      DISCECTOMY    HX  SECTION  1984    [address]    HX HERNIA REPAIR      UMBILICAL    HX LITHOTRIPSY      HX MALIGNANT SKIN LESION EXCISION      HX TONSILLECTOMY      AS A CHILD    HX UROLOGICAL Bilateral 2014    STENTS X 2    HX WISDOM TEETH EXTRACTION  1976       Personal factors and/or comorbidities impacting plan of care: Motivated/A & O x 4/Supportive Family/Independent PLOF     Home Situation  Home Environment: Private residence  # Steps to Enter: 0  Rails to Enter: (N/A)  Hand Rails : (N/A)  Wheelchair Ramp: No  One/Two Story Residence: Two story, live on 1st floor  Living Alone: No  Support Systems: Spouse/Significant Other/Partner, Family member(s)  Patient Expects to be Discharged to[de-identified] Private residence  Current DME Used/Available at Home: Jonita Nagi, straight, Walker, rolling, Shower chair, Grab bars    EXAMINATION/PRESENTATION/DECISION MAKING:   Critical Behavior:  Neurologic State: Alert, Appropriate for age  Orientation Level: Oriented X4, Appropriate for age  Cognition: Appropriate decision making, Appropriate for age attention/concentration, Appropriate safety awareness, Follows commands    Range Of Motion:  AROM: Generally decreased, functional           PROM: Generally decreased, functional           Strength:    Strength: Generally decreased, functional                    Tone & Sensation:   Tone: Normal              Sensation: Intact               Coordination:  Coordination: Within functional limits  Vision:      Functional Mobility:  Bed Mobility:     Supine to Sit: Contact guard assistance  Sit to Supine: Contact guard assistance  Scooting: Contact guard assistance  Transfers:  Sit to Stand: Contact guard assistance  Stand to Sit: Contact guard assistance                       Balance:   Sitting: Intact  Standing: Impaired; Without support  Standing - Static: Good;Constant support  Standing - Dynamic : Good;Constant support  Ambulation/Gait Training:  Distance (ft): 65 Feet (ft)  Assistive Device: Walker, rolling;Gait belt  Ambulation - Level of Assistance: Contact guard assistance        Gait Abnormalities: Antalgic; Step to gait     Left Side Weight Bearing: As tolerated  Base of Support: Widened;Shift to right  Stance: Left decreased  Speed/Chary: Slow  Step Length: Right shortened  Swing Pattern: Left asymmetrical         Therapeutic Exercises:   Initiated post-ADELA exercise program to include: Ankle Pumps  Quad sets (5 second hold)  X 10 reps every hour   Heel slides x 10       Functional Measure:  Barthel Index:    Bathin  Bladder: 10  Bowels: 10  Groomin  Dressin  Feeding: 10  Mobility: 0  Stairs: 0  Toilet Use: 5  Transfer (Bed to Chair and Back): 10  Total: 50/100       The Barthel ADL Index: Guidelines  1. The index should be used as a record of what a patient does, not as a record of what a patient could do. 2. The main aim is to establish degree of independence from any help, physical or verbal, however minor and for whatever reason. 3. The need for supervision renders the patient not independent. 4. A patient's performance should be established using the best available evidence. Asking the patient, friends/relatives and nurses are the usual sources, but direct observation and common sense are also important. However direct testing is not needed. 5. Usually the patient's performance over the preceding 24-48 hours is important, but occasionally longer periods will be relevant. 6. Middle categories imply that the patient supplies over 50 per cent of the effort. 7. Use of aids to be independent is allowed. Frederica Del., Barthel, D.W. (6031). Functional evaluation: the Barthel Index. [facility] (14)2. JOELLE Poon, Myrla Holter., Chicho Dickerson., [place], [address] [place] [address] (). Measuring the change indisability after inpatient rehabilitation; comparison of the responsiveness of the Barthel Index and Functional Traverse Measure. Journal of Neurology, Neurosurgery, and Psychiatry, 66(4), 238-691.   Anju Kauffman NLUCIEN.A, VIOLETTA Ferrer, Elizbaeth Philippe MLoganA. (2004.) Assessment of post-stroke quality of life in cost-effectiveness studies: The usefulness of the Barthel Index and the EuroQoL-5D. Quality of Life Research, 15, 620-11           Physical Therapy Evaluation Charge Determination   History Examination Presentation Decision-Making   LOW Complexity : Zero comorbidities / personal factors that will impact the outcome / POC LOW Complexity : 1-2 Standardized tests and measures addressing body structure, function, activity limitation and / or participation in recreation  LOW Complexity : Stable, uncomplicated  LOW Complexity : FOTO score of       Based on the above components, the patient evaluation is determined to be of the following complexity level: LOW     Pain Ratin/10    Activity Tolerance:   Good  Please refer to the flowsheet for vital signs taken during this treatment. Vitals:    20 1321 20 1350 20 1400 20 1425   BP: 131/76 115/72 112/69 112/69   BP 1 Location:  Right arm Right arm    BP Patient Position: At rest At rest;Head of bed elevated (Comment degrees); Other (comment)  Comment: 35 degrees During activity; Sitting At rest   Pulse: 66 70 79 75   Resp: 16 15  16   Temp: 97.3 °F (36.3 °C)   98.5 °F (36.9 °C)   SpO2: 97% 98%  98%   Weight:       Height:            After treatment patient left in no apparent distress:   Supine in bed, Call bell within reach, Side rails x 3, and nurse notified. COMMUNICATION/EDUCATION:   The patients plan of care was discussed with: Registered nurse. Fall prevention education was provided and the patient/caregiver indicated understanding., Patient/family have participated as able in goal setting and plan of care. , and Patient/family agree to work toward stated goals and plan of care.     Thank you for this referral.  Avelino Leonard   Time Calculation: 35 mins

## 2020-06-16 NOTE — ROUTINE PROCESS
Patient: Thalia Monk MRN: [id number]  SSN: [id number]   YOB: 1960  Age: 62 y.o. Sex: female     Patient is status post Procedure(s):  LEFT TOTAL HIP ARTHROPLASTY (SPINAL WITH IV SED). Surgeon(s) and Role:     Landon Kumar MD - Primary    Local/Dose/Irrigation:  100 cc surgical pain solution                  Peripheral IV 06/16/20 Left Hand (Active)                           Dressing/Packing:  Wound Hip Left;Lateral-Dressing Type: Aquacel; Topical skin adhesive/glue(pillow between legs) (06/16/20 0900)    Splint/Cast:  ]    Other:    - straight cath at end of case

## 2020-06-16 NOTE — BRIEF OP NOTE
Brief Postoperative Note    Patient: Pablo Mcguire  YOB: 1960  MRN: [id number]    Date of Procedure: 6/16/2020     Pre-Op Diagnosis: OSTEOARTHRITIS LEFT HIP    Post-Op Diagnosis: Same as preoperative diagnosis. Procedure(s):  LEFT TOTAL HIP ARTHROPLASTY (SPINAL WITH IV SED)    Surgeon(s):  Isaiah Brittle, MD    Surgical Assistant: Physician Assistant: Osmany Maradiaga PA-C    Anesthesia: Spinal     Estimated Blood Loss (mL): 891    Complications: None    Specimens: * No specimens in log *     Implants:   Implant Name Type Inv.  Item Serial No.  Lot No. LRB No. Used Action   EMPOWR acetabular cup, cluster hole, 56H   N/A  [id number] Left 1 Implanted   EMPOWR Ecatbular liner, HXe+, neutral, 36H   N/A  [id number] Left 1 Implanted   Femoral hip size 10 standard offset   N/A  [id number] Left 1 Implanted   EMPOWR aetabular bone screw, 6.5x40 mm   N/A  [id number] Left 1 Implanted   HEAD FEMORAL CERAMIC 36MM +4 - SN/A Joint Component HEAD FEMORAL CERAMIC 36MM +4 N/A [employer] [id number] Left 1 Implanted       Drains: * No LDAs found *    Findings: left hip OA    Electronically Signed by Chary Savage PA-C on 6/16/2020 at 11:12 AM

## 2020-06-16 NOTE — PERIOP NOTES
PATIENT CALLED AND MADE AWARE OF COVID-19 TESTING NEEDED TO BE DONE WITHIN 96 HOURS OF SURGERY. COVID-19 TESTING APPOINTMENT MADE FOR PATIENT. PATIENT INSTRUCTED ON SELF QUARANTINE BETWEEN TESTING AND ARRIVAL TIME DAY OF SURGERY.
Patient family (daughter-in-law Desirae Salazar) updated on surgery status via cell phone at [phone]. Currently waiting at home.
Applied

## 2020-06-16 NOTE — PROGRESS NOTES
TRANSFER - IN REPORT:    Verbal report received from [id number] PingSome (name) on Court Pelayo  being received from PACU (unit) for routine post - op      Report consisted of patients Situation, Background, Assessment and   Recommendations(SBAR). Information from the following report(s) SBAR, Kardex, OR Summary, Intake/Output and MAR was reviewed with the receiving nurse. Opportunity for questions and clarification was provided. Assessment completed upon patients arrival to unit and care assumed.

## 2020-06-16 NOTE — ANESTHESIA POSTPROCEDURE EVALUATION
Post-Anesthesia Evaluation and Assessment    Patient: Lorne aHrper MRN: [id number]  SSN: [id number]    YOB: 1960  Age: 62 y.o. Sex: female      I have evaluated the patient and they are stable and ready for discharge from the PACU. Cardiovascular Function/Vital Signs  Visit Vitals  /69   Pulse 65   Temp 36.4 °C (97.5 °F)   Resp 12   Ht 5' 8\" (1.727 m)   Wt 113.4 kg (250 lb)   SpO2 95%   BMI 38.01 kg/m²       Patient is status post Spinal anesthesia for Procedure(s):  LEFT TOTAL HIP ARTHROPLASTY (SPINAL WITH IV SED). Nausea/Vomiting: None    Postoperative hydration reviewed and adequate. Pain:  Pain Intensity 1: 6 (06/16/20 0743)   Managed    Neurological Status:   Neuro (WDL): Within Defined Limits (06/16/20 0751)   At baseline    Mental Status, Level of Consciousness: Alert and  oriented to person, place, and time    Pulmonary Status:   O2 Device: Nasal cannula (06/16/20 1157)   Adequate oxygenation and airway patent    Complications related to anesthesia: None    Post-anesthesia assessment completed. No concerns    Signed By: Chato Bonner MD     June 16, 2020              Procedure(s):  LEFT TOTAL HIP ARTHROPLASTY (SPINAL WITH IV SED). spinal    <BSHSIANPOST>    INITIAL Post-op Vital signs:   Vitals Value Taken Time   /69 6/16/2020 12:15 PM   Temp     Pulse 62 6/16/2020 12:23 PM   Resp 8 6/16/2020 12:23 PM   SpO2 95 % 6/16/2020 12:23 PM   Vitals shown include unvalidated device data.

## 2020-06-16 NOTE — PROGRESS NOTES
Bedside and Verbal shift change report given to Ernst Zimmerman (oncoming nurse) by Pat Monsivais (offgoing nurse). Report included the following information SBAR, Kardex, OR Summary and MAR.

## 2020-06-16 NOTE — DISCHARGE SUMMARY
[address]     DISCHARGE SUMMARY     Name: Melania Gao       MR#: [id number]    : 1960  ADMIT DATE: 2020  DISCHARGE DATE: 2020     ADMISSION DIAGNOSIS: Osteoarthritis of left hip [M16.12]     DISCHARGE DIAGNOSIS: OSTEOARTHRITIS LEFT HIP     PROCEDURE PERFORMED: Procedure(s):  LEFT TOTAL HIP ARTHROPLASTY (SPINAL WITH IV SED)     CONSULTATIONS:  None.     HISTORY OF PRESENT ILLNESS: The patient is a 55-year-old female with progressive left hip and groin pain due to severe osteoarthritis. Symptoms have progressed despite comprehensive conservative treatment. She presents for left total hip replacement. Risks, benefits, alternatives of procedure reviewed with her in detail and she desires to proceed.     HOSPITAL COURSE:  The patient underwent the aforementioned procedure on date of admission under spinal anesthesia with adductor canal block. There were no immediate postoperative complications. She was started on a multimodal pain regimen and DVT prophylaxis.     DISPOSITION: The patient made slow, steady progress with physical therapy and was appropriate for discharge to Home in stable condition on postoperative day 1. DISCHARGE MEDICATIONS:  Reinitiate preadmission medications. In addition, the patient will be on ASA for DVT prophylaxis and low dose oxycodone and Tylenol for pain. DISCHARGE INSTRUCTIONS:  Detailed printed instructions were provided to the patient. Follow up with Dr. Marycruz Romero in approximately 3 weeks. The patient will receive home health physical therapy in the meantime.     Signed by: Miki Colvin PA-C  2020

## 2020-06-16 NOTE — ANESTHESIA PREPROCEDURE EVALUATION
Relevant Problems   No relevant active problems       Anesthetic History   No history of anesthetic complications            Review of Systems / Medical History  Patient summary reviewed, nursing notes reviewed and pertinent labs reviewed    Pulmonary  Within defined limits                 Neuro/Psych         Headaches     Cardiovascular            Dysrhythmias : SVT and atrial fibrillation           GI/Hepatic/Renal  Within defined limits              Endo/Other    Diabetes: type 2  Hypothyroidism  Obesity and arthritis     Other Findings            Physical Exam    Airway  Mallampati: II  TM Distance: > 6 cm  Neck ROM: normal range of motion   Mouth opening: Normal     Cardiovascular  Regular rate and rhythm,  S1 and S2 normal,  no murmur, click, rub, or gallop             Dental  No notable dental hx       Pulmonary  Breath sounds clear to auscultation               Abdominal  GI exam deferred       Other Findings            Anesthetic Plan    ASA: 2  Anesthesia type: spinal            Anesthetic plan and risks discussed with: Patient

## 2020-06-16 NOTE — PROGRESS NOTES
Primary Nurse Angelique Solomon and Marleny Medrano RN performed a dual skin assessment on this patient No impairment noted  Malcolm score is 20

## 2020-06-17 VITALS
WEIGHT: 250 LBS | SYSTOLIC BLOOD PRESSURE: 125 MMHG | HEIGHT: 68 IN | BODY MASS INDEX: 37.89 KG/M2 | DIASTOLIC BLOOD PRESSURE: 71 MMHG | TEMPERATURE: 97.9 F | HEART RATE: 70 BPM | RESPIRATION RATE: 16 BRPM | OXYGEN SATURATION: 97 %

## 2020-06-17 LAB
ANION GAP SERPL CALC-SCNC: 7 MMOL/L (ref 5–15)
BUN SERPL-MCNC: 22 MG/DL (ref 6–20)
BUN/CREAT SERPL: 27 (ref 12–20)
CALCIUM SERPL-MCNC: 8 MG/DL (ref 8.5–10.1)
CHLORIDE SERPL-SCNC: 106 MMOL/L (ref 97–108)
CO2 SERPL-SCNC: 24 MMOL/L (ref 21–32)
CREAT SERPL-MCNC: 0.82 MG/DL (ref 0.55–1.02)
GLUCOSE SERPL-MCNC: 127 MG/DL (ref 65–100)
HGB BLD-MCNC: 10.2 G/DL (ref 11.5–16)
POTASSIUM SERPL-SCNC: 4 MMOL/L (ref 3.5–5.1)
SODIUM SERPL-SCNC: 137 MMOL/L (ref 136–145)

## 2020-06-17 PROCEDURE — 74011250636 HC RX REV CODE- 250/636: Performed by: PHYSICIAN ASSISTANT

## 2020-06-17 PROCEDURE — 85018 HEMOGLOBIN: CPT

## 2020-06-17 PROCEDURE — 74011000250 HC RX REV CODE- 250: Performed by: PHYSICIAN ASSISTANT

## 2020-06-17 PROCEDURE — 74011250637 HC RX REV CODE- 250/637: Performed by: PHYSICIAN ASSISTANT

## 2020-06-17 PROCEDURE — 80048 BASIC METABOLIC PNL TOTAL CA: CPT

## 2020-06-17 PROCEDURE — 97535 SELF CARE MNGMENT TRAINING: CPT

## 2020-06-17 PROCEDURE — 36415 COLL VENOUS BLD VENIPUNCTURE: CPT

## 2020-06-17 PROCEDURE — 97116 GAIT TRAINING THERAPY: CPT

## 2020-06-17 PROCEDURE — 97165 OT EVAL LOW COMPLEX 30 MIN: CPT

## 2020-06-17 RX ADMIN — KETOROLAC TROMETHAMINE 15 MG: 30 INJECTION, SOLUTION INTRAMUSCULAR at 01:01

## 2020-06-17 RX ADMIN — DULOXETINE HYDROCHLORIDE 60 MG: 60 CAPSULE, DELAYED RELEASE ORAL at 07:09

## 2020-06-17 RX ADMIN — ACETAMINOPHEN 500 MG: 500 TABLET ORAL at 07:09

## 2020-06-17 RX ADMIN — KETOROLAC TROMETHAMINE 15 MG: 30 INJECTION, SOLUTION INTRAMUSCULAR at 07:09

## 2020-06-17 RX ADMIN — ASPIRIN 81 MG: 81 TABLET, COATED ORAL at 09:03

## 2020-06-17 RX ADMIN — ACETAMINOPHEN 500 MG: 500 TABLET ORAL at 09:03

## 2020-06-17 RX ADMIN — OXYCODONE 5 MG: 5 TABLET ORAL at 09:04

## 2020-06-17 RX ADMIN — CEFAZOLIN SODIUM 2 G: 300 INJECTION, POWDER, LYOPHILIZED, FOR SOLUTION INTRAVENOUS at 01:01

## 2020-06-17 RX ADMIN — DILTIAZEM HYDROCHLORIDE 240 MG: 120 CAPSULE, COATED, EXTENDED RELEASE ORAL at 07:09

## 2020-06-17 NOTE — PROGRESS NOTES
Orthopaedics Daily Progress Note                            Date of Surgery:  6/16/2020      Patient: Edilma Montez   YOB: 1960  Age: 62 y.o. SUBJECTIVE:   1 Day Post-Op following LEFT TOTAL HIP ARTHROPLASTY (SPINAL WITH IV SED). The patient's post operative pain is controlled. No CP/SOB. No N/V. The patient's mobility will be evaluated today during PT sessions. Has been OOB several times    OBJECTIVE:     Vital Signs:      Visit Vitals  /68   Pulse 75   Temp 98.4 °F (36.9 °C)   Resp 16   Ht 5' 8\" (1.727 m)   Wt 113.4 kg (250 lb)   SpO2 98%   BMI 38.01 kg/m²       Physical Exam:  General: A&Ox3. The patient is cooperative, and in no acute distress. Respiratory: Respirations are unlabored. Surgical site(s): dressing clean, dry  Musculoskeletal: Calves are soft, supple, and non-tender upon palpation. Motor 5/5. Neurological:  Neurovascularly intact with good dorsi and plantar flexion. Pulses symmetrical.    Laboratory Values:             Recent Results (from the past 12 hour(s))   HEMOGLOBIN    Collection Time: 06/17/20  4:54 AM   Result Value Ref Range    HGB 10.2 (L) 11.5 - 08.6 g/dL   METABOLIC PANEL, BASIC    Collection Time: 06/17/20  4:54 AM   Result Value Ref Range    Sodium 137 136 - 145 mmol/L    Potassium 4.0 3.5 - 5.1 mmol/L    Chloride 106 97 - 108 mmol/L    CO2 24 21 - 32 mmol/L    Anion gap 7 5 - 15 mmol/L    Glucose 127 (H) 65 - 100 mg/dL    BUN 22 (H) 6 - 20 MG/DL    Creatinine 0.82 0.55 - 1.02 MG/DL    BUN/Creatinine ratio 27 (H) 12 - 20      GFR est AA >60 >60 ml/min/1.73m2    GFR est non-AA >60 >60 ml/min/1.73m2    Calcium 8.0 (L) 8.5 - 10.1 MG/DL         PLAN:     S/P LEFT TOTAL HIP ARTHROPLASTY (SPINAL WITH IV SED) -Continue WBAT. -Mobilize and continue with PT/OT until discharged     Hemodynamics Hgb today is 10.2. Acute blood loss anemia as expected. Patient asymptomatic. Continue to monitor.      Wound Monitor postop dressing; no postop dressing changes necessary. Reinforce PRN. Post Operative Pain Pain Control: stable, mild-to-moderate joint symptoms intermittently, reasonably well controlled by current meds. DVT Prophylaxis Continue with SCD'S, Ankle Pump Exercises. ASA 81mg BID     Discharge Disposition Discharge plan: Home with HHPT pending PT clearance today.        Signed By: Kathryn Jenkins PA-C  June 17, 2020 8:22 AM

## 2020-06-17 NOTE — PROGRESS NOTES
Problem: Mobility Impaired (Adult and Pediatric)  Goal: *Acute Goals and Plan of Care (Insert Text)  Description: FUNCTIONAL STATUS PRIOR TO ADMISSION: Patient was independent and active without use of DME.    HOME SUPPORT PRIOR TO ADMISSION: The patient lived with  but did not require assist.    Physical Therapy Goals  Initiated 6/16/2020    1. Patient will move from supine to sit and sit to supine , scoot up and down and roll side to side in bed with modified independence within 4 days. 2. Patient will perform sit to stand with modified independence within 4 days. 3. Patient will ambulate with modified independence for 150 feet with the least restrictive device within 4 days. 4. Patient will ascend/descend 4 stairs with both handrail(s) with modified independence within 4 days. 5. Patient will perform post-ADELA home exercise program per protocol with independence within 4 days. Outcome: Resolved/Met   PHYSICAL THERAPY TREATMENT/DISCHARGE  Patient: Melania Gao (59 y.o. female)  Date: 6/17/2020  Diagnosis: Osteoarthritis of left hip [M16.12]   <principal problem not specified>  Procedure(s) (LRB):  LEFT TOTAL HIP ARTHROPLASTY (SPINAL WITH IV SED) (Left) 1 Day Post-Op  Precautions: Fall, WBAT  Chart, physical therapy assessment, plan of care and goals were reviewed. ASSESSMENT  Patient continues with skilled PT services and has met acute care PT goals. Patient is cleared for discharge from PT standpoint. Patient  is independent with post-op ADELA exercise protocol and has same in written, illlustrated form. Educated patient on Discharge Instructions relating to PT program progression post-discharge. She demonstrated/verbalized understanding. Barthel Functional Score has improved to 85/100 which is indicative of minimal impaired ability to care for basic self-needs/dependency on others. Other factors to consider for discharge:  Motivated/A & O x 4/Supportive Family/Independent PLOF PLAN :  Patient will be discharged from acute skilled physical therapy at this time. Rationale for discharge:  Goals achieved    Recommendation for discharge: (in order for the patient to meet his/her long term goals)  Physical therapy at least 2 days/week in the home     This discharge recommendation:  Has been made in collaboration with the attending provider and/or case management    IF patient discharges home will need the following DME: patient owns DME required for discharge       SUBJECTIVE:   Patient stated I am ready to go! Louellen Rides    OBJECTIVE DATA SUMMARY:   Critical Behavior:  Neurologic State: Alert, Appropriate for age  Orientation Level: Oriented X4  Cognition: Appropriate safety awareness, Appropriate for age attention/concentration, Appropriate decision making  Safety/Judgement: Awareness of environment, Insight into deficits  Functional Mobility Training:  Bed Mobility:  Rolling: Modified independent  Supine to Sit: Modified independent  Sit to Supine: Modified independent  Scooting: Modified independent        Transfers:  Sit to Stand: Modified independent  Stand to Sit: Modified independent        Bed to Chair: Modified independent                    Balance:  Sitting: Intact  Standing: Intact; With support  Standing - Static: Good;Constant support  Standing - Dynamic : Good;Constant support  Ambulation/Gait Training:  Distance (ft): 150 Feet (ft)  Assistive Device: Walker, rolling;Gait belt  Ambulation - Level of Assistance: Supervision        Gait Abnormalities: Antalgic(has progressed to step-through gait)     Left Side Weight Bearing: As tolerated  Base of Support: Widened  Stance: Left decreased  Speed/Chary: Slow  Step Length: Right shortened  Swing Pattern: Left asymmetrical       Stairs:  Number of Stairs Trained: 4  Stairs - Level of Assistance: Supervision   Rail Use: Right (one rail and cane)    Functional Measures:  Barthel Index:    Bathin  Bladder: 10  Bowels: 10  Grooming: 5  Dressing: 10  Feeding: 10  Mobility: 15  Stairs: 0  Toilet Use: 10  Transfer (Bed to Chair and Back): 10  Total: 85/100       The Barthel ADL Index: Guidelines  1. The index should be used as a record of what a patient does, not as a record of what a patient could do. 2. The main aim is to establish degree of independence from any help, physical or verbal, however minor and for whatever reason. 3. The need for supervision renders the patient not independent. 4. A patient's performance should be established using the best available evidence. Asking the patient, friends/relatives and nurses are the usual sources, but direct observation and common sense are also important. However direct testing is not needed. 5. Usually the patient's performance over the preceding 24-48 hours is important, but occasionally longer periods will be relevant. 6. Middle categories imply that the patient supplies over 50 per cent of the effort. 7. Use of aids to be independent is allowed. Mitchell Rees., Barthel, D.W. (1167). Functional evaluation: the Barthel Index. [facility] (14)2. Jermaine Wilkinson oksana JOELLE David, Lisbet Kasper., [facility], [address] [place] [address] (). Measuring the change indisability after inpatient rehabilitation; comparison of the responsiveness of the Barthel Index and Functional [place] Measure. Journal of Neurology, Neurosurgery, and Psychiatry, 66(4), 591-890. Arnold Bajwa, N.J.A, VIOLETTA Ferrer, & Vipul Cruz, M.A. (2004.) Assessment of post-stroke quality of life in cost-effectiveness studies: The usefulness of the Barthel Index and the EuroQoL-5D. Quality of Life Research, 13, 038-86        Therapeutic Exercises:   Patient  is independent with post-op ADELA exercise protocol and has same in written, illlustrated form. Pain Ratin/10    Activity Tolerance:   Good      After treatment patient left in no apparent distress:   Sitting in chair, Call bell within reach, and nurse notified. COMMUNICATION/COLLABORATION:   The patients plan of care was discussed with: Registered nurse and Case management.      Jn Sarmiento   Time Calculation: 30 mins

## 2020-06-17 NOTE — PROGRESS NOTES
Bedside shift change report given to Ciera Soler (oncoming nurse) by Hao Agarwal (offgoing nurse). Report included the following information SBAR, Kardex, Intake/Output, MAR and Recent Results.

## 2020-06-17 NOTE — PROGRESS NOTES
OCCUPATIONAL THERAPY EVALUATION/DISCHARGE  Patient: Luz Elena Hanson (70 y.o. female)  Date: 6/17/2020  Primary Diagnosis: Osteoarthritis of left hip [M16.12]  Procedure(s) (LRB):  LEFT TOTAL HIP ARTHROPLASTY (SPINAL WITH IV SED) (Left) 1 Day Post-Op   Precautions:  Fall, WBAT    ASSESSMENT  Based on the objective data described below, the patient presents with decreased higher level mobility/balance and activity tolerance, as well as, reports of L hip pain; however, pt noted to be completing self-care routine with Modified Gaines at . Pt verbalizes and demonstrates good understanding of AE technique to distal LE 2/2 recent R hip replacement, with good safety awareness also noted. Pt reports all AE/DME needs fulfilled and states she will have family staying with her the first couple of nights once discharged home. OT educated pt on recommendation for Supervision during first attempt at seated shower, with good understanding verbalized. 2/2 pt having all DME/AE needs fulfilled, good safety understanding and AE technique, PRN  ADL/IADL assist at home level, as well as, current ability to complete ADL routines with Modified Gaines at , no other acute OT needs identified, with OT answering pt's questions/concerns regarding discharge and pt thanking therapist for his efforts. Current Level of Function (ADLs/self-care): Mod Gaines at [facility]    Functional Outcome Measure: The patient scored 85/100 on the Barthel Index outcome measure. Other factors to consider for discharge: none     PLAN :  Recommendation for discharge: (in order for the patient to meet his/her long term goals)  No skilled occupational therapy/ follow up rehabilitation needs identified at this time.     This discharge recommendation:  Has been made in collaboration with the attending provider and/or case management    IF patient discharges home will need the following DME: patient owns DME required for discharge SUBJECTIVE:   Patient stated I have all the equipment I need, I had my other hip done not too long ago.     OBJECTIVE DATA SUMMARY:   HISTORY:   Past Medical History:   Diagnosis Date    Arthritis     RHEUMATOID    Atrial fibrillation ([facility] 2018    Autoimmune disease ([facility]     RHEUMATOID ARTHRITIS    Chronic pain     HIP    Kidney stones     Melanoma ([facility]     RT FOREARM    Migraine     Rheumatoid arthritis(714.0)     SVT (supraventricular tachycardia) ([facility] 2018    Thyroid disease     HYPOTHYROIDISM     Past Surgical History:   Procedure Laterality Date    HX BACK SURGERY      DISCECTOMY    HX  SECTION  ,     HX CHOLECYSTECTOMY      HX HERNIA REPAIR  5335    UMBILICAL    HX LITHOTRIPSY      HX MALIGNANT SKIN LESION EXCISION      HX TONSILLECTOMY      AS A CHILD    HX UROLOGICAL Bilateral 2014    STENTS X 2    HX WISDOM TEETH EXTRACTION         Prior Level of Function/Environment/Context: Pt reports living alone with 2 dogs, Independent with ADL/IADLs, showering in walk-in shower with grab bars and shower chair. Expanded or extensive additional review of patient history:     Home Situation  Home Environment: Private residence  # Steps to Enter: 0  Rails to Enter: (N/A)  Hand Rails : (N/A)  Wheelchair Ramp: No  One/Two Story Residence: Two story, live on 1st floor  Living Alone: Yes  Support Systems: Friends \ neighbors  Patient Expects to be Discharged to[de-identified] Private residence  Current DME Used/Available at Home: [place], straight, Shower chair, Grab bars, Walker, rolling  Tub or Shower Type: Shower    Hand dominance: Right    EXAMINATION OF PERFORMANCE DEFICITS:  Cognitive/Behavioral Status:  Neurologic State: Alert; Appropriate for age  Orientation Level: Oriented X4  Cognition: Appropriate safety awareness; Appropriate for age attention/concentration; Appropriate decision making  Perception: Appears intact  Perseveration: No perseveration noted  Safety/Judgement: Awareness of environment; Insight into deficits    Hearing: Auditory  Auditory Impairment: None    Vision/Perceptual:    Acuity: Within Defined Limits    Corrective Lenses: Glasses    Range of Motion:  AROM: Within functional limits(BUE)  PROM: Within functional limits(BUE)    Strength:  Strength: Within functional limits(BUE)    Coordination:  Coordination: Within functional limits(BUE)  Fine Motor Skills-Upper: Left Intact; Right Intact    Gross Motor Skills-Upper: Left Intact; Right Intact    Tone & Sensation:  Tone: Normal  Sensation: Intact    Balance:  Sitting: Intact  Standing: Intact; With support(at RW)  Standing - Static: Good;Constant support  Standing - Dynamic : Good;Constant support    Functional Mobility and Transfers for ADLs:  Bed Mobility:  Rolling: Modified independent  Supine to Sit: Modified independent  Sit to Supine: Modified independent  Scooting: Modified independent    Transfers:  Sit to Stand: Modified independent  Stand to Sit: Modified independent  Bed to Chair: Modified independent  Bathroom Mobility: Modified independent  Toilet Transfer : Modified independent    ADL Assessment:  The patient recalled and demonstrated hip contraindications Left LE during ADLs and functional mobility with no cues. Feeding: Independent    Oral Facial Hygiene/Grooming: Modified Independent    Bathing: Modified independent    Upper Body Dressing: Modified independent    Lower Body Dressing: Modified independent    Toileting: Modified independent     ADL Intervention and task modifications:  Patient instructed and indicated understanding the benefits of maintaining activity tolerance, functional mobility, and independence with self care tasks during acute stay  to ensure safe return home and to baseline.  Encouraged patient to increase frequency and duration OOB, be out of bed for all meals, perform daily ADLs (as approved by RN/MD regarding bathing etc), and performing functional mobility to/from bathroom. Pt educated on safe transfer techniques, with specific emphasis on proper hand placement to push up from seated surface rather than attempt to pull self up, fully positioning self in-front of desired seated location, feeling chair on back of legs and reaching back with 1-2 UE to slowly lower self to seated position. Cognitive Retraining  Safety/Judgement: Awareness of environment; Insight into deficits    Bathing: Patient instructed when bathing to not submerge wound in water, stand to shower or sponge bathe, cover wound with plastic and tape to ensure no water reaches bandage/wound without cues. Patient indicated understanding. Dressing joint: Patient instructed and demonstrated to don/doff Left LE first/last single verbal cue. Patient instructed and demonstrated to don all clothing while sitting prior to standing, doff all clothing to knees while standing, then sit to doff clothing off from knees to feet in order to facilitate fall prevention, pain management, and energy conservation with Modified independent. Home safety: Patient instructed on home modifications and safety (raise height of ADL objects, appropriate height of chair surfaces, recliner safety, change of floor surfaces, clear pathways) to increase independence and fall prevention. Patient indicated understanding. Standing: Patient instructed and demonstrated to walk up to sink/counter top/surfaces, step into walker to increase safety of joint and fall prevention with Modified independent. Instructed to apply concept of hip contraindications to ADLs within the home. Patient instructed to increase amount of time standing, observe standing position during ADLs in order to increase even weight bearing through bilateral LEs in order to increase independence with ADLs. Goal to be reached 30 days post - op, per orthopedic surgeon or per PT. Patient indicated understanding.     Functional Measure:  Barthel Index:    Bathin  Bladder: 10  Bowels: 10  Groomin  Dressing: 10  Feeding: 10  Mobility: 15  Stairs: 0  Toilet Use: 10  Transfer (Bed to Chair and Back): 10  Total: 85/100        The Barthel ADL Index: Guidelines  1. The index should be used as a record of what a patient does, not as a record of what a patient could do. 2. The main aim is to establish degree of independence from any help, physical or verbal, however minor and for whatever reason. 3. The need for supervision renders the patient not independent. 4. A patient's performance should be established using the best available evidence. Asking the patient, friends/relatives and nurses are the usual sources, but direct observation and common sense are also important. However direct testing is not needed. 5. Usually the patient's performance over the preceding 24-48 hours is important, but occasionally longer periods will be relevant. 6. Middle categories imply that the patient supplies over 50 per cent of the effort. 7. Use of aids to be independent is allowed. Dipika Lentz., Barthel, D.W. (9075). Functional evaluation: the Barthel Index. [facility] (14)2. Toney Levine oksana YESSI DavidF, Michael Astudillo., Shanika Bhakta., [place], [address] [place] [address] (). Measuring the change indisability after inpatient rehabilitation; comparison of the responsiveness of the Barthel Index and Functional [place] Measure. Journal of Neurology, Neurosurgery, and Psychiatry, 66(4), 003-211. Thuan Fox, N.J.A, ARMIN FerrerJ.ALEXI, & Vamshi Montgomery, M.A. (2004.) Assessment of post-stroke quality of life in cost-effectiveness studies: The usefulness of the Barthel Index and the EuroQoL-5D.  Quality of Life Research, 15, 178-09     Occupational Therapy Evaluation Charge Determination   History Examination Decision-Making   LOW Complexity : Brief history review  LOW Complexity : 1-3 performance deficits relating to physical, cognitive , or psychosocial skils that result in activity limitations and / or participation restrictions  LOW Complexity : No comorbidities that affect functional and no verbal or physical assistance needed to complete eval tasks       Based on the above components, the patient evaluation is determined to be of the following complexity level: LOW   Pain Ratin/10 c/o pain s/p ADL routine completion; nursing aware and following, ice applied    Activity Tolerance:   Good and requires rest breaks  Please refer to the flowsheet for vital signs taken during this treatment. After treatment patient left in no apparent distress:    Supine in bed, Call bell within reach and Side rails x 3    COMMUNICATION/EDUCATION:   The patients plan of care was discussed with: Physical therapist, Registered nurse and Case management.      Thank you for this referral.  Angela Gonsalez OT  Time Calculation: 33 mins

## 2020-06-17 NOTE — OP NOTES
[address]   OPERATIVE REPORT    Name:  Terri Browne  MR#:  [id number]  :  1960  ACCOUNT #:  [de-identified]  DATE OF SERVICE:  2020    PREOPERATIVE DIAGNOSIS:  Osteoarthritis, left hip. POSTOPERATIVE DIAGNOSIS:  Osteoarthritis, left hip. PROCEDURE PERFORMED:  Left total hip arthroplasty. SURGEON:  Deanna Ellison MD    FIRST ASSISTANT:  Casandra Owens PA-C    ANESTHESIA:  Spinal with sedation. COMPLICATIONS:  None. SPECIMENS REMOVED:  None. IMPLANTS:  Components implanted:  DonJoy 56-mm Empowr acetabular shell with one cancellous screw and 36-mm inside diameter neutral polyethylene liner, size 10 standard offset TaperFill femoral stem with 36 mm +4 ceramic femoral head. ESTIMATED BLOOD LOSS:  250 mL. INDICATIONS:  The patient is a 18-year-old female with progressive left hip and groin pain due to severe osteoarthritis. Symptoms have progressed despite comprehensive conservative treatment. She presents for left total hip replacement. Risks, benefits, alternatives of procedure reviewed with her in detail and she desires to proceed. PROCEDURE IN DETAIL:  The patient was taken to the operating room where Anesthesia team placed a spinal.  Preoperative IV antibiotics were administered. She was turned to right lateral decubitus position on a [facility] frame hip positioner. All bony prominences were well-padded and an axillary roll was placed. Left hip and thigh were prepped and draped in usual sterile fashion. Through a lateral hip incision, I performed a posterior approach to the left hip. Short rotators and posterior capsule were reflected posteriorly. Leg lengths were checked on the table for comparison with trial reduction later during the procedure. Hip was dislocated and femoral neck osteotomy was made. Acetabular retractors were placed and labrum was excised.   Acetabulum was reamed with hemispherical reamers up to 55 mm before impacting a 56-mm Empowr acetabular shell. Intrinsic stability was satisfactory, but it was augmented with one cancellous screw, 36 neutral trial liner was placed. Femur was prepared with TaperFill broaches up to size 10 before achieving rotational stability. Calcar was planed. Based on native anatomy, hip was reduced with a standard offset neck trial, 36 +4 head trial lengthened the extremity appropriately to make leg lengths equal.  Soft tissue tension was satisfactory. The hip was stable to full extension, external rotation with no internal impingement, stable to straight hyperflexion and with the hip flexed to 90 degrees and neutral abduction, there was greater than 70 degrees of internal rotation. Trials were removed. The wound was copiously irrigated by pulse lavage before the real components were implanted. Same leg length and stability were achieved. Periarticular soft tissues were injected with solution containing 0.5% ropivacaine with epinephrine as well as clonidine and Toradol. Posterior capsule was repaired to the inner aspect of posterior greater trochanter through drill holes using #2 Ethibond sutures. Deep fascia was closed with a combination of heavy Vicryl sutures and a running #2 Stratafix suture. Skin and subcutaneous layers were closed in layered fashion with Vicryl and a running Monocryl subcuticular stitch. The wound was dressed with Dermabond and an Aquacel occlusive dressing. The patient's bladder was decompressed with straight catheterization before she was transported to postanesthesia care unit in stable condition. All counts were correct at the end of the procedure. The physician assistant was critical throughout the case to assist with positioning, retraction, and closure. There were no other available residents, fellows, or surgical assistants available to assist during this procedure.         MD LATISHA [email or web address]  D:  06/16/2020 22:18  T:  06/17/2020 0: Franc #:  [id number]  CC:  MD Lester Zamarripa MD

## 2020-06-17 NOTE — PROGRESS NOTES
Laly Harry reviewed discharge instructions with the patient. The patient verbalized understanding. Patient has paper prescriptions and all personal belongings.

## 2020-06-17 NOTE — PROGRESS NOTES
KRISTI- D/c to home with [facility]    Reason for Admission:   Left total hip arthroplasty                   RUR Score:     Not available                Plan for utilizing home health:    Yes      PCP: First and Last name:  Jacob Lindsey   Name of Practice:    Are you a current patient: Yes/No: Yes   Approximate date of last visit: this month   Can you participate in a virtual visit with your PCP: Yes                    Current Advanced Directive/Advance Care Plan: Not on file. Transition of Care Plan:     CM met with pt to introduce her to CM role transition of care. This pt lives at home alone. However, her daughter in law is going to stay with her for a few days post d/c. She stated that she was independent prior to admission and is still working. CM informed her that she has home health orders and offered her freedom of choice. She stated that she would like to use [facility] for home health. CM sent a referral to [facility] in [employer] and they have accepted this pt. CM placed this information on pt's AVS. NASEEM Carr,AC-    Observation notice provided in writing to patient and/or caregiver as well as verbal explanation of the policy. Patients who are in outpatient status also receive the Observation notice. Care Management Interventions  PCP Verified by CM:  Yes  Palliative Care Criteria Met (RRAT>21 & CHF Dx)?: No  Transition of Care Consult (CM Consult): Discharge Planning  MyChart Signup: No  Discharge Durable Medical Equipment: No  Physical Therapy Consult: Yes  Occupational Therapy Consult: Yes  Speech Therapy Consult: No  Current Support Network: Lives Alone  Confirm Follow Up Transport: Family  The Plan for Transition of Care is Related to the Following Treatment Goals : safe d/c  The Patient and/or Patient Representative was Provided with a Choice of Provider and Agrees with the Discharge Plan?: Yes  Freedom of Choice List was Provided with Basic Dialogue that Supports the Patient's Individualized Plan of Care/Goals, Treatment Preferences and Shares the Quality Data Associated with the Providers?: Yes  [employer] Information Provided?: No

## 2020-06-17 NOTE — PROGRESS NOTES
Occupational Therapy    Orders received, chart reviewed and patient evaluated by occupational therapy. Pending progression with skilled acute occupational therapy, recommend:  No skilled occupational therapy/ follow up rehabilitation needs identified at this time. Recommend with nursing patient to complete as able in order to maintain strength, endurance and independence: OOB to chair 3x/day with Mod Roberts at RW and functional mobility to the bathroom with Mod Roberts at RW. Thank you for your assistance. Full evaluation to follow.      Shana Garcia, OT

## 2020-06-18 ENCOUNTER — TELEPHONE (OUTPATIENT)
Dept: INTERNAL MEDICINE CLINIC | Age: 60
End: 2020-06-18

## 2020-06-18 NOTE — TELEPHONE ENCOUNTER
20 1:19 PM--attempted to reach patient but she is not answering at this time. She has a personal greeting in , so message was left introducing myself and the purpose of my call. My phone # was left in message for her return call. 1:35 PM    Patient contacted regarding recent discharge and COVID-19 risk. Discussed COVID-19 related testing which was available at this time. Test results were negative. Patient informed of results, if available? yes    Care Transition Nurse/ Ambulatory Care Manager contacted the patient by telephone to perform post discharge assessment. Verified name and  with patient as identifiers. Patient has following risk factors of: immunocompromised. CTN/ACM reviewed discharge instructions, medical action plan and red flags related to discharge diagnosis. Did not review and educate them on any new and changed medications related to discharge diagnosis--could not find an AVS for her in [facility]. She states she understands her medication regimen and does not feel she needs a medication review with our pharmacist at this time. She confirms she has a F/U appt with her surgeon in July and I advised her to also schedule a 1-week F/U with her PCP. She is scheduled to have home health through [facility] and I've given her their number. Education provided regarding infection prevention, and signs and symptoms of COVID-19 and when to seek medical attention with patient who verbalized understanding. Discussed exposure protocols and quarantine from [address] you at higher risk for severe illness  and given an opportunity for questions and concerns. The patient agrees to contact the COVID-19 hotline [phone] or PCP office for questions related to their healthcare. CTN/ACM provided contact information for future reference. From CDC: Are you at higher risk for severe illness?  Wash your hands often.    Avoid close contact (6 feet, which is about two arm lengths) with people who are sick.  Put distance between yourself and other people if COVID-19 is spreading in your community.  Clean and disinfect frequently touched surfaces.  Avoid all cruise travel and non-essential air travel.  Call your healthcare professional if you have concerns about COVID-19 and your underlying condition or if you are sick. For more information on steps you can take to protect yourself, see CDC's How to Protect Yourself      She declines offer to help her activate MyChart. Have provided our COVID-19 resource phone #s for any questions/concerns. She states she has none at this time. Reviewed and confirmed health care decision makers. Has ACP on file; encouraged her to review it periodically to make sure information is up-to-date and her wishes are current. Patient/family/caregiver given information for [employer] and agrees to enroll no, declined  Patient's preferred e-mail:  N/A  Patient's preferred phone number: N/A  Based on Loop alert triggers, patient will be contacted by nurse care manager for worsening symptoms. Plan for follow-up call in 7-14 days based on severity of symptoms and risk factors.

## 2020-07-07 ENCOUNTER — TELEPHONE (OUTPATIENT)
Dept: CASE MANAGEMENT | Age: 60
End: 2020-07-07

## 2020-07-07 NOTE — TELEPHONE ENCOUNTER
7/7/20 3:05 PM     Patient resolved from Transition of Care episode on 7/7/20. Discussed COVID-19 related testing which was available at this time. Test results were negative. Patient informed of results, if available? yes     Patient/family has been provided the following resources and education related to COVID-19:                         Signs, symptoms and red flags related to COVID-19            [facility] exposure and quarantine guidelines            [employer] exposure contact - [phone]            Contact for their local Department of Health                 Patient currently reports that the following symptoms have improved: pt states she is doing really well after her hip surgery and anticipates being released from PT tomorrow. I wished her the best and we ended the call. No further outreach scheduled with this CTN/ACM/LPN/HC/ MA. Episode of Care resolved. Patient has this CTN/ACM/LPN/HC/MA contact information if future needs arise.

## 2020-08-04 ENCOUNTER — OFFICE VISIT (OUTPATIENT)
Dept: CARDIOLOGY CLINIC | Age: 60
End: 2020-08-04
Payer: COMMERCIAL

## 2020-08-04 VITALS
SYSTOLIC BLOOD PRESSURE: 142 MMHG | WEIGHT: 254.8 LBS | HEART RATE: 81 BPM | BODY MASS INDEX: 38.74 KG/M2 | DIASTOLIC BLOOD PRESSURE: 94 MMHG | OXYGEN SATURATION: 96 % | RESPIRATION RATE: 16 BRPM

## 2020-08-04 DIAGNOSIS — R73.02 IMPAIRED GLUCOSE TOLERANCE: ICD-10-CM

## 2020-08-04 DIAGNOSIS — I47.1 SVT (SUPRAVENTRICULAR TACHYCARDIA) (HCC): ICD-10-CM

## 2020-08-04 DIAGNOSIS — I48.3 TYPICAL ATRIAL FLUTTER (HCC): ICD-10-CM

## 2020-08-04 DIAGNOSIS — Z01.818 PRE-OP EXAMINATION: Primary | ICD-10-CM

## 2020-08-04 DIAGNOSIS — R07.9 CHEST PAIN, UNSPECIFIED TYPE: ICD-10-CM

## 2020-08-04 DIAGNOSIS — E66.01 SEVERE OBESITY (HCC): ICD-10-CM

## 2020-08-04 DIAGNOSIS — E55.9 VITAMIN D DEFICIENCY: ICD-10-CM

## 2020-08-04 DIAGNOSIS — M05.79 RHEUMATOID ARTHRITIS INVOLVING MULTIPLE SITES WITH POSITIVE RHEUMATOID FACTOR (HCC): ICD-10-CM

## 2020-08-04 DIAGNOSIS — Z82.49 FAMILY HISTORY OF EARLY CAD: ICD-10-CM

## 2020-08-04 DIAGNOSIS — R00.2 PALPITATIONS: ICD-10-CM

## 2020-08-04 PROCEDURE — 99214 OFFICE O/P EST MOD 30 MIN: CPT | Performed by: INTERNAL MEDICINE

## 2020-08-04 RX ORDER — ETANERCEPT 50 MG/ML
1 SOLUTION SUBCUTANEOUS
COMMUNITY

## 2020-08-04 NOTE — PROGRESS NOTES
[facility] [address]:   [phone]    HPI: Oliverio Collins, a 56y.o. year-old who presents for follow up regarding her chest pain. Bp is up a bit today, on Diltiazem. Has been running high at home sometimes, Had her last hip surgery and had more trouble, sick etc.   The last one was 6/16. But usually lower and hopes it will go back down a few points as she continues to recover. She is doing well on diltiazem CD, no chest pain  Denies any palpitations  No dyspnea with exertion, no PND  No dizziness or syncope  No LE edema   Not exercising because she is supposed to have 1 of 2 hip surgeries in December 2019 with Dr. Riccardo Aiken, needs both hips replaced but will do one at a time  She doesn't know if she snores since her  passed, says she may have snored mildly in the past but never had any apnea episodes  She does not drink caffeine/etoh  Says her stress level is moderate, she gets adequate sleep  Only sees PCP when she is sick- but yao to have her labs checked and have her physical and she will do that before the end of the year. 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   Rare flutters not a problem. Assessment/Plan:  1. Aflutter vs SVT: [id number](female), discussed CVA risk but she prefers to take ASA only for now, continue diltiazem CD 240mg daily   2. Chest pain- no CAD by cardiac cath in 2014, no ischemia on stress test in 6/19, continue diltiazem CD  -has Rx for NTG SL tabs PRN chest pain  3. Body mass index is 38.74 kg/m². exercise limited by orthopedic issues, working on diet/weight loss     4. IGT - A1C 5.7%, exercise limited by orthopedic issues, working on diet/weight loss     5. Sedentary lifestyle - exercise limited by orthopedic issues, working on diet/weight loss     6. RA- discussed the risks of CAD associated with RA, on Enbrel injections, followed by Dr Aguilar/rheumatology   7.   Hypothyroidism - on levothyroxine, TFTs ok in 2/19  8. Family hx of early CAD - no plaque on coronary calcium scan in 6/19   9. Vitamin D deficiency - level 34    Coronary calcium scan 6/19 - zero  Nuc Stress 6/19 - no ischemia   Echo 11/17 - LVEF 55-60%, no WMA  Cath normal 2014  Soc no tob no etoh   Fhx + for CAD in her father who had CABG at 43    She  has a past medical history of Arthritis, Atrial fibrillation ([facility] (01/2018), Autoimmune disease ([facility] (2005), Chronic pain, Kidney stones, Melanoma ([facility] (2001), Migraine, Rheumatoid arthritis(714.0), SVT (supraventricular tachycardia) ([facility] (01/2018), and Thyroid disease (1990). Cardiovascular ROS: no palpitations or chest pain   Respiratory ROS: no cough or wheezing  Neurological ROS: no TIA or stroke symptoms  All other systems negative except as above. PE  Vitals:    08/04/20 1027   BP: (!) 142/94   Pulse: 81   Resp: 16   SpO2: 96%   Weight: 254 lb 12.8 oz (115.6 kg)    Body mass index is 38.74 kg/m².    General appearance - alert, well appearing, and in no distress  Mental status - affect appropriate to mood  Eyes - sclera anicteric, moist mucous membranes  Neck - supple, no carotid bruits   Lymphatics - not assessed   Chest - clear to auscultation, no wheezes, rales or rhonchi  Heart - normal rate, regular rhythm, normal S1, S2, no murmurs, rubs, clicks or gallops  Abdomen - soft, nontender, nondistended  Back exam - full range of motion  Neurological - cranial nerves II through XII grossly intact, no focal deficit  Musculoskeletal - normal strength  Extremities - peripheral pulses normal, no pedal edema  Skin - normal coloration  no rashes    Recent Labs:  Lab Results   Component Value Date/Time    Cholesterol, total 170 02/20/2019 03:23 PM    HDL Cholesterol 65 02/20/2019 03:23 PM    LDL, calculated 81 02/20/2019 03:23 PM    Triglyceride 118 02/20/2019 03:23 PM     Lab Results   Component Value Date/Time    Creatinine 0.82 06/17/2020 04:54 AM     Lab Results   Component Value Date/Time    BUN 22 (H) 2020 04:54 AM     Lab Results   Component Value Date/Time    Potassium 4.0 2020 04:54 AM     Lab Results   Component Value Date/Time    Hemoglobin A1c 5.9 (H) 2020 11:48 AM     Lab Results   Component Value Date/Time    HGB 10.2 (L) 2020 04:54 AM     Lab Results   Component Value Date/Time    PLATELET 131  11:48 AM       Reviewed:  Past Medical History:   Diagnosis Date    Arthritis     RHEUMATOID    Atrial fibrillation ([facility] 2018    Autoimmune disease ([facility]     RHEUMATOID ARTHRITIS    Chronic pain     HIP    Kidney stones     Melanoma ([facility]     RT FOREARM    Migraine     Rheumatoid arthritis(714.0)     SVT (supraventricular tachycardia) ([facility] 2018    Thyroid disease     HYPOTHYROIDISM     Social History     Tobacco Use   Smoking Status Former Smoker    Packs/day: 0.25    Years: 0.50    Pack years: 0.12    Last attempt to quit: 1973    Years since quittin.6   Smokeless Tobacco Never Used   Tobacco Comment    when she was 10     Social History     Substance and Sexual Activity   Alcohol Use Not Currently    Frequency: Monthly or less    Drinks per session: 1 or 2    Binge frequency: Never    Comment: Rare     Allergies   Allergen Reactions    Bactrim [Sulfamethoprim] Hives    Ciprofloxacin Hives       Current Outpatient Medications   Medication Sig    aspirin delayed-release 81 mg tablet Take 1 Tab by mouth two (2) times a day. Indications: blood clot prevention    acetaminophen (TYLENOL) 500 mg tablet Take 1 Tab by mouth every four (4) hours.  ascorbic acid, vitamin C, (Vitamin C) 250 mg chew Take 1 Tab by mouth nightly.  dilTIAZem CD (CARDIZEM CD) 240 mg ER capsule Take 1 Cap by mouth daily. (Patient taking differently: Take 240 mg by mouth every morning.)    calcium carbonate (OS-LIAM) 500 mg calcium (1,250 mg) tablet Take 2 Tabs by mouth nightly.     DULoxetine (CYMBALTA) 60 mg capsule Take 60 mg by mouth every morning.  b-complex with vitamin c (VITAMIN B COMPLEX WITH C) cap capsule Take 1 Cap by mouth nightly.  methotrexate (RHEUMATREX) 2.5 mg tablet Take 20 mg by mouth every Monday.  levothyroxine (SYNTHROID) 125 mcg tablet Take 125 mcg by mouth nightly.  multivitamin (ONE A DAY) tablet Take 2 Tabs by mouth nightly.  etanercept (EnbreL) 50 mg/mL (1 mL) injection 1 mL.  senna-docusate (PERICOLACE) 8.6-50 mg per tablet Take 1 Tab by mouth two (2) times daily as needed for Constipation.  diclofenac EC (VOLTAREN) 75 mg EC tablet Take 75 mg by mouth two (2) times a day. No current facility-administered medications for this visit.         Martine Garcia MD  [facility] [place]  [address] [facility]  [address]

## 2020-08-04 NOTE — PROGRESS NOTES
Room: 15    Identified pt with two pt identifiers(name and ). Reviewed record in preparation for visit and have obtained necessary documentation. All patient medications has been reviewed. Chief Complaint   Patient presents with    Annual Exam       Health Maintenance Due   Topic    Hepatitis C Screening     Pneumococcal 0-64 years (1 of 1 - PPSV23)    Foot Exam Q1     MICROALBUMIN Q1     Eye Exam Retinal or Dilated     DTaP/Tdap/Td series (1 - Tdap)    PAP AKA CERVICAL CYTOLOGY     Shingrix Vaccine Age 49> (1 of 2)    Breast Cancer Screen Mammogram     FOBT Q1Y Age 54-65     Lipid Screen     Influenza Age 5 to Adult        Vitals:    20 1027   BP: (!) 142/94   Pulse: 81   Resp: 16   SpO2: 96%   Weight: 254 lb 12.8 oz (115.6 kg)   PainSc:   0 - No pain       1. Have you traveled outside of the [address] [place] [address] in the last month No    2. Have you been in close contact with someone who is suspected to have COVID-19 or has tested positive. No    3. Do you have any signs or symptoms. ? 4. Have you been to the ER, urgent care clinic since your last visit? Hospitalized since your last visit? No    5. Have you seen or consulted any other health care providers outside of the [address] [place] [address] since your last visit? Include any pap smears or colon screening. No  \  Patient is accompanied by self I have received verbal consent from Bailey Tobin to discuss any/all medical information while they are present in the room.

## 2021-04-09 ENCOUNTER — HOSPITAL ENCOUNTER (OUTPATIENT)
Dept: GENERAL RADIOLOGY | Age: 61
Discharge: HOME | End: 2021-04-09
Payer: COMMERCIAL

## 2021-04-09 ENCOUNTER — TRANSCRIBE ORDER (OUTPATIENT)
Dept: GENERAL RADIOLOGY | Age: 61
End: 2021-04-09

## 2021-04-09 DIAGNOSIS — M16.0 PRIMARY OSTEOARTHRITIS OF BOTH HIPS: ICD-10-CM

## 2021-04-09 DIAGNOSIS — M16.0 PRIMARY OSTEOARTHRITIS OF BOTH HIPS: Primary | ICD-10-CM

## 2021-04-09 DIAGNOSIS — M25.572 LEFT LATERAL ANKLE PAIN: ICD-10-CM

## 2021-04-09 DIAGNOSIS — M05.9 SEROPOSITIVE RHEUMATOID ARTHRITIS (HCC): ICD-10-CM

## 2021-04-09 PROCEDURE — 73610 X-RAY EXAM OF ANKLE: CPT | Performed by: INTERNAL MEDICINE

## 2021-08-03 NOTE — PROGRESS NOTES
[address] [address]:   [phone]    HPI: Amrit Crouch, a 59y.o. year-old who presents for follow up regarding her palpitations. She recently had her physical with Dr. Juan Luis Owens and her BP was high so she was started on losartan  An ECG was done and they discussed her palpitations   She says Dr. Juan Luis Owens was asking if she is having SVT or AFL or if she can tell the difference  She reports that she has palpitations about 3 times/month, episodes last a few seconds usually  More severe episodes can last a minute or two and usually respond to valsalva manuevers  She denies any dizziness or syncope with episodes  No unusual bleeding or bruising on ASA  Only has some mild LE edema which she attributes to her diltiazem CD  No dyspnea with exertion  No chest pain   Has random episodes of dizziness which are not severe, not positional, resolved quickly  She doesn't know if she snores since her  passed, says she may have snored mildly in the past but never had any apnea episodes  Works as a  at [employer] - starts back to school Aug 30  Discussed doing loop monitor to evaluate rhythm and she recommended waiting to do loop until she returns to school in late August - thinks she will have more palpitations then  She will call and request loop monitor be ordered in late August     **After her visit records received from PCP  ECG showed NSR, Labs dated 5/26/21 showed normal CBC, A1c 5.9%, TSH 1.6, normal CMP, , , HDL 50,     Assessment/Plan:  1.  Aflutter vs SVT: [id number] score was 1 (female) but now she has a diagnosis of HTN so her [id number] score is 2  -will repeat loop monitor to see if she is continuing to have episodes of Aflutter or Afib prior to deciding whether or not to transition to [facility], she prefers to take ASA only for now, continue diltiazem CD 240mg daily   -she prefers to follow up with me moving forward, will schedule her for an annual visit in August 2022  2. Chest pain- no CAD by cardiac cath in 2014, no ischemia on stress test in 6/19, continue diltiazem CD  -has Rx for NTG SL tabs PRN chest pain  3. Body mass index is 40.29 kg/m². exercise limited by orthopedic issues, working on diet/weight loss     4. IGT - exercise limited by orthopedic issues, working on diet/weight loss     5. RA- discussed the risks of CAD associated with RA in the past, on Enbrel injections, followed by Dr Aguilar/rheumatology   6. Hypothyroidism - on levothyroxine  7. Family hx of early CAD - no plaque on coronary calcium scan in 6/19   8. HTN - just started on losartan by PCP    Coronary calcium scan 6/19 - zero  Nuc Stress 6/19 - no ischemia   Echo 11/17 - LVEF 55-60%, no WMA  Cath normal 2014    Soc Hx: no tob no etoh   Fhx + for CAD in her father who had CABG at 43    She  has a past medical history of Arthritis, Atrial fibrillation ([address] (01/2018), Autoimmune disease ([address] (2005), Chronic pain, Kidney stones, Melanoma ([address] (2001), Migraine, Rheumatoid arthritis(714.0), SVT (supraventricular tachycardia) ([address] (01/2018), and Thyroid disease (1990). Cardiovascular ROS: no chest pain   Respiratory ROS: no cough or wheezing  Neurological ROS: no TIA or stroke symptoms  All other systems negative except as above. PE  Vitals:    08/04/21 1022   BP: 116/82   Pulse: 78   SpO2: 97%   Weight: 265 lb (120.2 kg)   Height: 5' 8\" (1.727 m)    Body mass index is 40.29 kg/m².    General appearance - alert, well appearing, and in no distress  Mental status - affect appropriate to mood  Eyes - sclera anicteric, moist mucous membranes  Neck - supple, no carotid bruits   Lymphatics - not assessed   Chest - clear to auscultation, no wheezes, rales or rhonchi  Heart - normal rate, regular rhythm, normal S1, S2, no murmurs, rubs, clicks or gallops  Abdomen - soft, nontender, nondistended  Back exam - full range of motion  Neurological - cranial nerves II through XII grossly intact, no focal deficit  Musculoskeletal - normal strength  Extremities - peripheral pulses normal, no pedal edema  Skin - normal coloration  no rashes    Recent Labs:  Lab Results   Component Value Date/Time    Cholesterol, total 170 2019 03:23 PM    HDL Cholesterol 65 2019 03:23 PM    LDL, calculated 81 2019 03:23 PM    Triglyceride 118 2019 03:23 PM     Lab Results   Component Value Date/Time    Creatinine 0.82 2020 04:54 AM     Lab Results   Component Value Date/Time    BUN 22 (H) 2020 04:54 AM     Lab Results   Component Value Date/Time    Potassium 4.0 2020 04:54 AM     Lab Results   Component Value Date/Time    Hemoglobin A1c 5.9 (H) 2020 11:48 AM     Lab Results   Component Value Date/Time    HGB 10.2 (L) 2020 04:54 AM     Lab Results   Component Value Date/Time    PLATELET 938  11:48 AM       Reviewed:  Past Medical History:   Diagnosis Date    Arthritis     RHEUMATOID    Atrial fibrillation ([facility] 2018    Autoimmune disease ([facility]     RHEUMATOID ARTHRITIS    Chronic pain     HIP    Kidney stones     Melanoma ([facility]     RT FOREARM    Migraine     Rheumatoid arthritis(714.0)     SVT (supraventricular tachycardia) ([facility] 2018    Thyroid disease     HYPOTHYROIDISM     Social History     Tobacco Use   Smoking Status Former Smoker    Packs/day: 0.25    Years: 0.50    Pack years: 0.12    Quit date: 80    Years since quittin.6   Smokeless Tobacco Never Used   Tobacco Comment    when she was 10     Social History     Substance and Sexual Activity   Alcohol Use Not Currently    Comment: Rare     Allergies   Allergen Reactions    Bactrim [Sulfamethoprim] Hives    Ciprofloxacin Hives       Current Outpatient Medications   Medication Sig    losartan (COZAAR) 50 mg tablet Take  by mouth daily.  FOLIC ACID PO Take 967 mg by mouth daily.     dilTIAZem ER (CARDIZEM CD) 240 mg capsule TAKE ONE CAPSULE BY MOUTH ONE TIME DAILY    etanercept (EnbreL) 50 mg/mL (1 mL) injection 1 mL.  aspirin delayed-release 81 mg tablet Take 1 Tab by mouth two (2) times a day. Indications: blood clot prevention    acetaminophen (TYLENOL) 500 mg tablet Take 1 Tab by mouth every four (4) hours.  ascorbic acid, vitamin C, (Vitamin C) 250 mg chew Take 1 Tab by mouth nightly.  diclofenac EC (VOLTAREN) 75 mg EC tablet Take 75 mg by mouth two (2) times a day.  calcium carbonate (OS-LIAM) 500 mg calcium (1,250 mg) tablet Take 2 Tabs by mouth nightly.  DULoxetine (CYMBALTA) 60 mg capsule Take 60 mg by mouth every morning.  b-complex with vitamin c (VITAMIN B COMPLEX WITH C) cap capsule Take 1 Cap by mouth nightly.  methotrexate (RHEUMATREX) 2.5 mg tablet Take 10 mg by mouth every Monday.  levothyroxine (SYNTHROID) 125 mcg tablet Take 125 mcg by mouth nightly.  multivitamin (ONE A DAY) tablet Take 2 Tabs by mouth nightly. No current facility-administered medications for this visit.        Artur Vazquez, RICARDO  [facility] [place]  [facility] [facility], [address] [place] [address]

## 2021-08-04 ENCOUNTER — OFFICE VISIT (OUTPATIENT)
Dept: CARDIOLOGY CLINIC | Age: 61
End: 2021-08-04
Payer: COMMERCIAL

## 2021-08-04 VITALS
HEIGHT: 68 IN | OXYGEN SATURATION: 97 % | WEIGHT: 265 LBS | DIASTOLIC BLOOD PRESSURE: 82 MMHG | HEART RATE: 78 BPM | SYSTOLIC BLOOD PRESSURE: 116 MMHG | BODY MASS INDEX: 40.16 KG/M2

## 2021-08-04 DIAGNOSIS — Z82.49 FAMILY HISTORY OF EARLY CAD: ICD-10-CM

## 2021-08-04 DIAGNOSIS — I10 ESSENTIAL HYPERTENSION: ICD-10-CM

## 2021-08-04 DIAGNOSIS — I47.1 SVT (SUPRAVENTRICULAR TACHYCARDIA) (HCC): Primary | ICD-10-CM

## 2021-08-04 DIAGNOSIS — I48.3 TYPICAL ATRIAL FLUTTER (HCC): ICD-10-CM

## 2021-08-04 DIAGNOSIS — R00.2 PALPITATIONS: ICD-10-CM

## 2021-08-04 PROCEDURE — 99214 OFFICE O/P EST MOD 30 MIN: CPT | Performed by: NURSE PRACTITIONER

## 2021-08-04 RX ORDER — LOSARTAN POTASSIUM 50 MG/1
TABLET ORAL DAILY
COMMUNITY

## 2021-08-04 RX ORDER — DILTIAZEM HYDROCHLORIDE 240 MG/1
CAPSULE, COATED, EXTENDED RELEASE ORAL
Qty: 90 CAPSULE | Refills: 3 | Status: SHIPPED | OUTPATIENT
Start: 2021-08-04 | End: 2022-08-11

## 2021-09-01 ENCOUNTER — DOCUMENTATION ONLY (OUTPATIENT)
Dept: CARDIOLOGY CLINIC | Age: 61
End: 2021-09-01

## 2021-09-01 ENCOUNTER — TELEPHONE (OUTPATIENT)
Dept: CARDIOLOGY CLINIC | Age: 61
End: 2021-09-01

## 2021-09-01 NOTE — PROGRESS NOTES
Patient called stating that she is ready for the Loop Monitor to be shipped to her home. Per Jailene's notes/order on 8-4-21;  Enrolled with [employer] - Ordered and being shipped to patient's home address on file. ETA within 5-7 business days.

## 2021-09-28 ENCOUNTER — TELEPHONE (OUTPATIENT)
Dept: CARDIOLOGY CLINIC | Age: 61
End: 2021-09-28

## 2021-09-28 NOTE — TELEPHONE ENCOUNTER
TC to patient and the following message given per NP Jailene:    Loop monitor just showed one episode of atrial tachycardia, no AFib or AFlutter so she doesn't need to take a blood thinner. Labs from May were ok. Continue diltiazem CD for now. No changes are necessary.     2 pt identifiers used

## 2021-09-28 NOTE — TELEPHONE ENCOUNTER
Loop monitor just showed one episode of atrial tachycardia, no AFib or AFlutter so she doesn't need to take a blood thinner. Labs from May were ok. Continue diltiazem CD for now. No changes are necessary.

## 2021-12-03 ENCOUNTER — OFFICE VISIT (OUTPATIENT)
Dept: URGENT CARE | Age: 61
End: 2021-12-03

## 2021-12-03 VITALS
OXYGEN SATURATION: 96 % | DIASTOLIC BLOOD PRESSURE: 69 MMHG | SYSTOLIC BLOOD PRESSURE: 145 MMHG | HEART RATE: 71 BPM | RESPIRATION RATE: 18 BRPM

## 2021-12-03 DIAGNOSIS — L03.011 CELLULITIS OF FINGER OF RIGHT HAND: Primary | ICD-10-CM

## 2021-12-03 PROCEDURE — 99203 OFFICE O/P NEW LOW 30 MIN: CPT | Performed by: NURSE PRACTITIONER

## 2021-12-03 RX ORDER — DOXYCYCLINE 100 MG/1
100 CAPSULE ORAL 2 TIMES DAILY
Qty: 20 CAPSULE | Refills: 0 | Status: SHIPPED | OUTPATIENT
Start: 2021-12-03 | End: 2021-12-13

## 2021-12-03 NOTE — PATIENT INSTRUCTIONS
Start on doxycyline twice daily x 10 days. Can start warm epsom salt soaks and apply topical neosporin several times daily. Continue to monitor for new or worsened signs or symptoms of infection as discussed (increased redness, swelling, pain, fever etc). Needs re-evaluation if noted. Cellulitis: Care Instructions  Your Care Instructions     Cellulitis is a skin infection caused by bacteria, most often strep or staph. It often occurs after a break in the skin from a scrape, cut, bite, or puncture, or after a rash. Cellulitis may be treated without doing tests to find out what caused it. But your doctor may do tests, if needed, to look for a specific bacteria, like methicillin-resistant Staphylococcus aureus (MRSA). The doctor has checked you carefully, but problems can develop later. If you notice any problems or new symptoms, get medical treatment right away. Follow-up care is a key part of your treatment and safety. Be sure to make and go to all appointments, and call your doctor if you are having problems. It's also a good idea to know your test results and keep a list of the medicines you take. How can you care for yourself at home? · Take your antibiotics as directed. Do not stop taking them just because you feel better. You need to take the full course of antibiotics. · Prop up the infected area on pillows to reduce pain and swelling. Try to keep the area above the level of your heart as often as you can. · If your doctor told you how to care for your wound, follow your doctor's instructions. If you did not get instructions, follow this general advice:  ? Wash the wound with clean water 2 times a day. Don't use hydrogen peroxide or alcohol, which can slow healing. ? You may cover the wound with a thin layer of petroleum jelly, such as Vaseline, and a nonstick bandage. ? Apply more petroleum jelly and replace the bandage as needed. · Be safe with medicines.  Take pain medicines exactly as directed. ? If the doctor gave you a prescription medicine for pain, take it as prescribed. ? If you are not taking a prescription pain medicine, ask your doctor if you can take an over-the-counter medicine. To prevent cellulitis in the future  · Try to prevent cuts, scrapes, or other injuries to your skin. Cellulitis most often occurs where there is a break in the skin. · If you get a scrape, cut, mild burn, or bite, wash the wound with clean water as soon as you can to help avoid infection. Don't use hydrogen peroxide or alcohol, which can slow healing. · If you have swelling in your legs (edema), support stockings and good skin care may help prevent leg sores and cellulitis. · Take care of your feet, especially if you have diabetes or other conditions that increase the risk of infection. Wear shoes and socks. Do not go barefoot. If you have athlete's foot or other skin problems on your feet, talk to your doctor about how to treat them. When should you call for help? Call your doctor now or seek immediate medical care if:    · You have signs that your infection is getting worse, such as:  ? Increased pain, swelling, warmth, or redness. ? Red streaks leading from the area. ? Pus draining from the area. ? A fever.     · You get a rash. Watch closely for changes in your health, and be sure to contact your doctor if:    · You do not get better as expected. Where can you learn more? Go to http://www.gray.com/  Enter X309 in the search box to learn more about \"Cellulitis: Care Instructions. \"  Current as of: March 3, 2021               Content Version: 13.0  © 3093-8177 [employer]. Care instructions adapted under license by [employer] (which disclaims liability or warranty for this information).  If you have questions about a medical condition or this instruction, always ask your healthcare professional. [employer] disclaims any warranty or liability for your use of this information.

## 2021-12-03 NOTE — PROGRESS NOTES
The history is provided by the patient. Skin Problem  This is a new problem. The current episode started yesterday (started noticing right third finger redness, tenderness and swelling yesterday). The problem has been gradually worsening (increased redness, swelling and pain). Pertinent negatives include no chest pain, no headaches and no shortness of breath. Associated symptoms comments: Denies fever, chills, decreased sensation  Denies trauma  Bites nail and has known open wounds on cuticles- gave dog bath several days ago and unsure if this was trigger. Exacerbated by: palpation. She has tried nothing for the symptoms.         Past Medical History:   Diagnosis Date    Arthritis     RHEUMATOID    Atrial fibrillation ([address] 2018    Autoimmune disease ([address]     RHEUMATOID ARTHRITIS    Chronic pain     HIP    Kidney stones     Melanoma ([address]     RT FOREARM    Migraine     Rheumatoid arthritis(714.0)     SV[facility]ra[facility] tachy[facility]) [facility] 2018    Thyroid disease     HYPOTHYROIDISM        Past Surgical History:   Procedure Laterality Date    HX BACK SURGERY      DISCECTOMY    HX  SECTION  ,     HX CHOLECYSTECTOMY      HX HERNIA REPAIR  3015    UMBILICAL    HX LITHOTRIPSY      HX MALIGNANT SKIN LESION EXCISION      HX TONSILLECTOMY      AS A CHILD    HX UROLOGICAL Bilateral 2014    STENTS X 2    HX WISDOM TEETH EXTRACTION  1976         Family History   Problem Relation Age of Onset    Stroke Mother     Heart Disease Mother     Stroke Father     Heart Disease Father     Crohn's Disease Sister     No Known Problems Sister     Crohn's Disease Son     No Known Problems Son     Anesth Problems Neg Hx         Social History     Socioeconomic History    Marital status:      Spouse name: Not on file    Number of children: Not on file    Years of education: Not on file    Highest education level: Not on file   Occupational History    Not on file   Tobacco Use    Smoking status: Former Smoker     Packs/day: 0.25     Years: 0.50     Pack years: 0.12     Quit date:      Years since quittin.9    Smokeless tobacco: Never Used    Tobacco comment: when she was 10   Substance and Sexual Activity    Alcohol use: Not Currently     Comment: Rare    Drug use: No    Sexual activity: Not on file   Other Topics Concern    Not on file   Social History Narrative    Not on file     Social Determinants of Health     Financial Resource Strain:     Difficulty of Paying Living Expenses: Not on file   Food Insecurity:     Worried About Running Out of Food in the Last Year: Not on file    Ban of Food in the Last Year: Not on file   Transportation Needs:     Lack of Transportation (Medical): Not on file    Lack of Transportation (Non-Medical): Not on file   Physical Activity:     Days of Exercise per Week: Not on file    Minutes of Exercise per Session: Not on file   Stress:     Feeling of Stress : Not on file   Social Connections:     Frequency of Communication with Friends and Family: Not on file    Frequency of Social Gatherings with Friends and Family: Not on file    Attends Restorationist Services: Not on file    Active Member of [address] [place] [address] [employer] or Organizations: Not on file    Attends Club or Organization Meetings: Not on file    Marital Status: Not on file   Intimate Partner Violence:     Fear of Current or Ex-Partner: Not on file    Emotionally Abused: Not on file    Physically Abused: Not on file    Sexually Abused: Not on file   Housing Stability:     Unable to Pay for Housing in the Last Year: Not on file    Number of [place] in the Last Year: Not on file    Unstable Housing in the Last Year: Not on file                ALLERGIES: Bactrim [sulfamethoprim] and Ciprofloxacin    Review of Systems   Constitutional: Negative for activity change, appetite change, fatigue and fever.    Respiratory: Negative for chest tightness and shortness of breath. Cardiovascular: Negative for chest pain. Gastrointestinal: Negative for diarrhea, nausea and vomiting. Musculoskeletal: Negative for arthralgias and myalgias. Skin: Positive for color change. Negative for wound. See HPI   Neurological: Negative for dizziness, light-headedness and headaches. Vitals:    12/03/21 1519   BP: (!) 145/69   Pulse: 71   Resp: 18   SpO2: 96%       Physical Exam  Vitals and nursing note reviewed. Constitutional:       General: She is not in acute distress. Appearance: Normal appearance. She is not ill-appearing. HENT:      Head: Normocephalic and atraumatic. Mouth/Throat:      Mouth: Mucous membranes are moist.   Eyes:      Conjunctiva/sclera: Conjunctivae normal.      Pupils: Pupils are equal, round, and reactive to light. Cardiovascular:      Rate and Rhythm: Normal rate and regular rhythm. Heart sounds: Normal heart sounds. No murmur heard. Pulmonary:      Effort: Pulmonary effort is normal.      Breath sounds: Normal breath sounds. No wheezing or rhonchi. Musculoskeletal:         General: Normal range of motion. Cervical back: Normal range of motion and neck supple. No muscular tenderness. Lymphadenopathy:      Cervical: No cervical adenopathy. Skin:     General: Skin is warm and dry. Findings: No rash. Comments: Right third finger with localized swelling, redness and increased warmth distal to PIP, TTP, no fluctuant area noted, superficial abrasions to nail bed, no streaking from site   Neurological:      Mental Status: She is alert and oriented to person, place, and time. Psychiatric:         Mood and Affect: Mood normal.         Behavior: Behavior normal.         MDM    Procedures        ICD-10-CM ICD-9-CM   1. Cellulitis of finger of right hand  L03.011 681.00       Orders Placed This Encounter    doxycycline (VIBRAMYCIN) 100 mg capsule     Sig: Take 1 Capsule by mouth two (2) times a day for 10 days. Dispense:  20 Capsule     Refill:  0      Start on doxycyline twice daily x 10 days. Can start warm epsom salt soaks and apply topical neosporin several times daily. Continue to monitor for new or worsened signs or symptoms of infection as discussed (increased redness, swelling, pain, fever etc). Needs re-evaluation if noted. The patient is to follow up with PCP PRN. Red flag signs and symptoms discussed with patient/parent indicated need for ED evaluation and treatment.     Signed By: Felix Canales NP     December 3, 2021

## 2022-03-18 PROBLEM — M16.12 OSTEOARTHRITIS OF LEFT HIP: Status: ACTIVE | Noted: 2020-06-16

## 2022-03-18 PROBLEM — E66.01 SEVERE OBESITY (HCC): Status: ACTIVE | Noted: 2019-02-20

## 2022-03-19 PROBLEM — E66.812 CLASS 2 OBESITY DUE TO EXCESS CALORIES WITHOUT SERIOUS COMORBIDITY WITH BODY MASS INDEX (BMI) OF 35.0 TO 35.9 IN ADULT: Status: ACTIVE | Noted: 2017-11-07

## 2022-03-19 PROBLEM — E66.09 CLASS 2 OBESITY DUE TO EXCESS CALORIES WITHOUT SERIOUS COMORBIDITY WITH BODY MASS INDEX (BMI) OF 35.0 TO 35.9 IN ADULT: Status: ACTIVE | Noted: 2017-11-07

## 2022-03-19 PROBLEM — I47.10 SVT (SUPRAVENTRICULAR TACHYCARDIA) (HCC): Status: ACTIVE | Noted: 2017-11-07

## 2022-03-19 PROBLEM — E11.21 TYPE 2 DIABETES WITH NEPHROPATHY (HCC): Status: ACTIVE | Noted: 2018-03-12

## 2022-03-19 PROBLEM — I47.1 SVT (SUPRAVENTRICULAR TACHYCARDIA) (HCC): Status: ACTIVE | Noted: 2017-11-07

## 2022-03-20 PROBLEM — M16.11 PRIMARY LOCALIZED OSTEOARTHRITIS OF RIGHT HIP: Status: ACTIVE | Noted: 2019-12-10

## 2022-06-16 ENCOUNTER — TELEPHONE (OUTPATIENT)
Dept: ORTHOPEDIC SURGERY | Age: 62
End: 2022-06-16

## 2022-06-16 NOTE — TELEPHONE ENCOUNTER
RN LM on patient's VM. Patient does need to premedicate prior to upcoming dental appointment. Also informed patient that she will need to premedicate as long as she is taking an immunosuppressant. Left call back number for patient to call back with pharmacy information if needed.

## 2022-06-17 DIAGNOSIS — Z96.642 STATUS POST LEFT HIP REPLACEMENT: Primary | ICD-10-CM

## 2022-06-17 RX ORDER — CEPHALEXIN 500 MG/1
CAPSULE ORAL
Qty: 4 CAPSULE | Refills: 2 | Status: SHIPPED | OUTPATIENT
Start: 2022-06-17

## 2022-08-11 RX ORDER — DILTIAZEM HYDROCHLORIDE 240 MG/1
CAPSULE, COATED, EXTENDED RELEASE ORAL
Qty: 90 CAPSULE | Refills: 3 | Status: SHIPPED | OUTPATIENT
Start: 2022-08-11

## 2022-08-15 ENCOUNTER — OFFICE VISIT (OUTPATIENT)
Dept: CARDIOLOGY CLINIC | Age: 62
End: 2022-08-15
Payer: COMMERCIAL

## 2022-08-15 VITALS
RESPIRATION RATE: 16 BRPM | BODY MASS INDEX: 41.98 KG/M2 | SYSTOLIC BLOOD PRESSURE: 100 MMHG | HEIGHT: 68 IN | HEART RATE: 75 BPM | DIASTOLIC BLOOD PRESSURE: 60 MMHG | OXYGEN SATURATION: 95 % | WEIGHT: 277 LBS

## 2022-08-15 DIAGNOSIS — E78.5 DYSLIPIDEMIA: ICD-10-CM

## 2022-08-15 DIAGNOSIS — I48.3 TYPICAL ATRIAL FLUTTER (HCC): Primary | ICD-10-CM

## 2022-08-15 DIAGNOSIS — Z82.49 FAMILY HISTORY OF EARLY CAD: ICD-10-CM

## 2022-08-15 DIAGNOSIS — R00.2 PALPITATIONS: ICD-10-CM

## 2022-08-15 DIAGNOSIS — I10 ESSENTIAL HYPERTENSION: ICD-10-CM

## 2022-08-15 PROCEDURE — 93000 ELECTROCARDIOGRAM COMPLETE: CPT | Performed by: NURSE PRACTITIONER

## 2022-08-15 PROCEDURE — 99214 OFFICE O/P EST MOD 30 MIN: CPT | Performed by: NURSE PRACTITIONER

## 2022-08-15 RX ORDER — CELECOXIB 100 MG/1
100 CAPSULE ORAL DAILY
COMMUNITY

## 2022-08-15 RX ORDER — PANTOPRAZOLE SODIUM 40 MG/1
40 TABLET, DELAYED RELEASE ORAL DAILY
COMMUNITY
Start: 2022-05-16

## 2022-08-15 NOTE — PATIENT INSTRUCTIONS
Please have fasting labs drawn at [address] [place] [address] on a morning that you are fasting

## 2022-08-15 NOTE — PROGRESS NOTES
[facility] [place]:   [phone]    HPI: Handy Marx, a 57y.o. year-old who presents for follow up regarding her palpitations. She reports that she has palpitations about 3 times/month, episodes last a few seconds usually  No severe episodes recently - those usually last a minute or two and respond to valsalva manuevers  She denies any dizziness or syncope with episodes  No unusual bleeding or bruising on ASA  Only has some mild LE edema which she attributes to her diltiazem CD  No dyspnea with exertion  No chest pain   No dizziness or lightheadedness  BP low today but she says it was been ok at other MD visits  Works as a  at [employer] - starts back to school soon and stress level will go up     Labs dated 5/26/21 showed normal CBC, A1c 5.9%, TSH 1.6, normal CMP, , , HDL 50,     Assessment/Plan:  1. Aflutter vs SVT: [id number] score was 1 (female) but now she has a diagnosis of HTN so her [id number] score is 2  -no atrial fib or atrial flutter on loop monitor in 9/21  -she prefers to take ASA only for now, continue diltiazem CD 240mg daily   -she will alternate visits between myself and Dr. Paula Pike going forward   2. Chest pain- no CAD by cardiac cath in 2014, no ischemia on stress test in 6/19, continue diltiazem CD  -has Rx for NTG SL tabs PRN chest pain  3. Body mass index is 42.12 kg/m². exercise limited by orthopedic issues, working on diet/weight loss     4. IGT - exercise limited by orthopedic issues, working on diet/weight loss     5. RA- discussed the risks of CAD associated with RA in the past, on Enbrel injections, followed by Dr Aguilar/rheumatology   6. Hypothyroidism - on levothyroxine  7. Family hx of early CAD - no plaque on coronary calcium scan in 6/19   8. HTN - BP low today on diltiazem CD and losartan, asked her to check BP daily for the next week and let me know if BP is consistently running low   9.   Dyslipidemia -  last year, will check fasting lipids now     Loop Monitor 9/21 - no AFib or Flutter, one episode of Atrial Tach  Coronary calcium scan 6/19 - zero  Nuc Stress 6/19 - no ischemia   Echo 11/17 - LVEF 55-60%, no WMA  Cath normal 2014    Soc Hx: no tob no etoh   Fhx + for CAD in her father who had CABG at 43    She  has a past medical history of Arthritis, Atrial fibrillation ([facility] (01/2018), Autoimmune disease ([facility] (2005), Chronic pain, Kidney stones, Melanoma ([facility] (2001), Migraine, Rheumatoid arthritis(714.0), SVT (supraventricular tachycardia) ([facility] (01/2018), and Thyroid disease (1990). Cardiovascular ROS: no chest pain   Respiratory ROS: no cough or wheezing  Neurological ROS: no TIA or stroke symptoms  All other systems negative except as above. PE  Vitals:    08/15/22 1001   BP: 100/60   Pulse: 75   Resp: 16   SpO2: 95%   Weight: 277 lb (125.6 kg)   Height: 5' 8\" (1.727 m)      Body mass index is 42.12 kg/m².    General appearance - alert, well appearing, and in no distress  Mental status - affect appropriate to mood  Eyes - sclera anicteric, moist mucous membranes  Neck - supple, no carotid bruits   Lymphatics - not assessed   Chest - clear to auscultation, no wheezes, rales or rhonchi  Heart - normal rate, regular rhythm, normal S1, S2, no murmurs, rubs, clicks or gallops  Abdomen - soft, nontender, nondistended  Back exam - full range of motion  Neurological - cranial nerves II through XII grossly intact, no focal deficit  Musculoskeletal - normal strength  Extremities - peripheral pulses normal, no pedal edema  Skin - normal coloration  no rashes    12 lead ECG: NSR    Recent Labs:  Lab Results   Component Value Date/Time    Cholesterol, total 170 02/20/2019 03:23 PM    HDL Cholesterol 65 02/20/2019 03:23 PM    LDL, calculated 81 02/20/2019 03:23 PM    Triglyceride 118 02/20/2019 03:23 PM     Lab Results   Component Value Date/Time    Creatinine 0.82 06/17/2020 04:54 AM     Lab Results   Component Value Date/Time    BUN 22 (H) 2020 04:54 AM     Lab Results   Component Value Date/Time    Potassium 4.0 2020 04:54 AM     Lab Results   Component Value Date/Time    Hemoglobin A1c 5.9 (H) 2020 11:48 AM    Hemoglobin A1c, External 5.9 2021 12:00 AM     Lab Results   Component Value Date/Time    HGB 10.2 (L) 2020 04:54 AM     Lab Results   Component Value Date/Time    PLATELET 929  11:48 AM       Reviewed:  Past Medical History:   Diagnosis Date    Arthritis     RHEUMATOID    Atrial fibrillation ([facility] 2018    Autoimmune disease ([facility]     RHEUMATOID ARTHRITIS    Chronic pain     HIP    Kidney stones     Melanoma ([facility]     RT FOREARM    Migraine     Rheumatoid arthritis(714.0)     SVT (supraventricular tachycardia) ([facility] 2018    Thyroid disease     HYPOTHYROIDISM     Social History     Tobacco Use   Smoking Status Former    Packs/day: 0.25    Years: 0.50    Pack years: 0.13    Types: Cigarettes    Quit date:     Years since quittin.6   Smokeless Tobacco Never   Tobacco Comments    when she was 14     Social History     Substance and Sexual Activity   Alcohol Use Not Currently    Comment: Rare     Allergies   Allergen Reactions    Bactrim [Sulfamethoprim] Hives    Ciprofloxacin Hives       Current Outpatient Medications   Medication Sig    pantoprazole (PROTONIX) 40 mg tablet Take 40 mg by mouth in the morning. celecoxib (CELEBREX) 100 mg capsule Take 100 mg by mouth in the morning. dilTIAZem ER (CARDIZEM CD) 240 mg capsule TAKE ONE CAPSULE BY MOUTH ONE TIME DAILY    losartan (COZAAR) 50 mg tablet Take  by mouth daily. FOLIC ACID PO Take 334 mg by mouth daily. etanercept (EnbreL) 50 mg/mL (1 mL) injection 1 mL. aspirin delayed-release 81 mg tablet Take 1 Tab by mouth two (2) times a day. Indications: blood clot prevention    acetaminophen (TYLENOL) 500 mg tablet Take 1 Tab by mouth every four (4) hours.     ascorbic acid, vitamin C, (Vitamin C) 250 mg chew Take 1 Tab by mouth nightly. diclofenac EC (VOLTAREN) 75 mg EC tablet Take 75 mg by mouth two (2) times a day. calcium carbonate (OS-LIAM) 500 mg calcium (1,250 mg) tablet Take 2 Tabs by mouth nightly. DULoxetine (CYMBALTA) 60 mg capsule Take 60 mg by mouth every morning.    b-complex with vitamin c cap capsule Take 1 Cap by mouth nightly. methotrexate (RHEUMATREX) 2.5 mg tablet Take 10 mg by mouth every Monday. levothyroxine (SYNTHROID) 125 mcg tablet Take 125 mcg by mouth nightly. multivitamin (ONE A DAY) tablet Take 2 Tabs by mouth nightly. cephALEXin (KEFLEX) 500 mg capsule Take four capsules one hour prior to dental appointment (Patient not taking: Reported on 8/15/2022)     No current facility-administered medications for this visit.        Елена Swift, NP  [facility] [place]  [address]

## 2022-08-25 LAB
CHOLEST SERPL-MCNC: 219 MG/DL (ref 100–199)
HDLC SERPL-MCNC: 40 MG/DL
IMP & REVIEW OF LAB RESULTS: NORMAL
LDLC SERPL CALC-MCNC: 142 MG/DL (ref 0–99)
TRIGL SERPL-MCNC: 203 MG/DL (ref 0–149)
VLDLC SERPL CALC-MCNC: 37 MG/DL (ref 5–40)

## 2022-08-25 NOTE — PROGRESS NOTES
Cholesterol is high with , should be less than 130. Please encourage her to increase exercise to 30 minutes/day daily and follow a low fat diet. Please ask her to get lipids rechecked in 6 months - put order in for this please.   Future Appointments  8/11/2023  10:20 AM   Darylene Alosa, MD CAVREY [facility] AMB

## 2022-08-26 ENCOUNTER — TELEPHONE (OUTPATIENT)
Dept: CARDIOLOGY CLINIC | Age: 62
End: 2022-08-26

## 2022-08-26 DIAGNOSIS — E78.5 DYSLIPIDEMIA: Primary | ICD-10-CM

## 2022-08-26 NOTE — TELEPHONE ENCOUNTER
RICARDO Jane RN  Cholesterol is high with , should be less than 130. Please encourage her to increase exercise to 30 minutes/day daily and follow a low fat diet. Please ask her to get lipids rechecked in 6 months - put order in for this please. Two patient identifiers verified. Per MD patient called and given results. Confirmed follow up. Patient verbalized understanding and denies any further questions or concerns at this time.      Future Appointments   Date Time Provider Melita Richter   8/11/2023 10:20 AM MD TRUONG Lam BS AMB     Order placed

## 2022-08-29 ENCOUNTER — HOSPITAL ENCOUNTER (OUTPATIENT)
Dept: GENERAL RADIOLOGY | Age: 62
Discharge: HOME | End: 2022-08-29
Attending: FAMILY MEDICINE
Payer: COMMERCIAL

## 2022-08-29 ENCOUNTER — TRANSCRIBE ORDER (OUTPATIENT)
Dept: REGISTRATION | Age: 62
End: 2022-08-29

## 2022-08-29 DIAGNOSIS — R05.3 CHRONIC COUGH: ICD-10-CM

## 2022-08-29 DIAGNOSIS — J40 BRONCHITIS, NOT SPECIFIED AS ACUTE OR CHRONIC: ICD-10-CM

## 2022-08-29 DIAGNOSIS — J40 BRONCHITIS, NOT SPECIFIED AS ACUTE OR CHRONIC: Primary | ICD-10-CM

## 2022-08-29 PROCEDURE — 71046 X-RAY EXAM CHEST 2 VIEWS: CPT

## 2023-04-22 ENCOUNTER — ANESTHESIA (OUTPATIENT)
Dept: SURGERY | Age: 63
End: 2023-04-22
Payer: COMMERCIAL

## 2023-04-22 ENCOUNTER — ANESTHESIA EVENT (OUTPATIENT)
Dept: SURGERY | Age: 63
End: 2023-04-22
Payer: COMMERCIAL

## 2023-04-22 ENCOUNTER — APPOINTMENT (OUTPATIENT)
Dept: CT IMAGING | Age: 63
DRG: 853 | End: 2023-04-22
Attending: EMERGENCY MEDICINE
Payer: COMMERCIAL

## 2023-04-22 ENCOUNTER — HOSPITAL ENCOUNTER (INPATIENT)
Age: 63
LOS: 5 days | Discharge: HOME | DRG: 853 | End: 2023-04-27
Attending: EMERGENCY MEDICINE | Admitting: INTERNAL MEDICINE
Payer: COMMERCIAL

## 2023-04-22 DIAGNOSIS — A41.9 SEPSIS, DUE TO UNSPECIFIED ORGANISM, UNSPECIFIED WHETHER ACUTE ORGAN DYSFUNCTION PRESENT (HCC): ICD-10-CM

## 2023-04-22 DIAGNOSIS — N39.0 URINARY TRACT INFECTION WITHOUT HEMATURIA, SITE UNSPECIFIED: ICD-10-CM

## 2023-04-22 DIAGNOSIS — N13.2 HYDRONEPHROSIS CONCURRENT WITH AND DUE TO CALCULI OF KIDNEY AND URETER: ICD-10-CM

## 2023-04-22 DIAGNOSIS — N17.9 AKI (ACUTE KIDNEY INJURY) (HCC): Primary | ICD-10-CM

## 2023-04-22 DIAGNOSIS — N20.0 KIDNEY STONE: ICD-10-CM

## 2023-04-22 PROBLEM — N10 ACUTE PYELONEPHRITIS: Status: ACTIVE | Noted: 2023-04-22

## 2023-04-22 LAB
ALBUMIN SERPL-MCNC: 2.6 G/DL (ref 3.5–5)
ALBUMIN/GLOB SERPL: 0.6 (ref 1.1–2.2)
ALP SERPL-CCNC: 111 U/L (ref 45–117)
ALT SERPL-CCNC: 39 U/L (ref 12–78)
ANION GAP SERPL CALC-SCNC: 5 MMOL/L (ref 5–15)
APPEARANCE UR: ABNORMAL
AST SERPL-CCNC: 32 U/L (ref 15–37)
BACTERIA URNS QL MICRO: ABNORMAL /HPF
BASOPHILS # BLD: 0 K/UL (ref 0–0.1)
BASOPHILS NFR BLD: 0 % (ref 0–1)
BILIRUB SERPL-MCNC: 0.7 MG/DL (ref 0.2–1)
BILIRUB UR QL CFM: NEGATIVE
BUN SERPL-MCNC: 49 MG/DL (ref 6–20)
BUN/CREAT SERPL: 16 (ref 12–20)
CALCIUM SERPL-MCNC: 8.6 MG/DL (ref 8.5–10.1)
CHLORIDE SERPL-SCNC: 102 MMOL/L (ref 97–108)
CO2 SERPL-SCNC: 28 MMOL/L (ref 21–32)
COLOR UR: ABNORMAL
COMMENT, HOLDF: NORMAL
CREAT SERPL-MCNC: 3.06 MG/DL (ref 0.55–1.02)
DIFFERENTIAL METHOD BLD: ABNORMAL
EOSINOPHIL # BLD: 0 K/UL (ref 0–0.4)
EOSINOPHIL NFR BLD: 0 % (ref 0–7)
EPITH CASTS URNS QL MICRO: ABNORMAL /LPF
ERYTHROCYTE [DISTWIDTH] IN BLOOD BY AUTOMATED COUNT: 15.9 % (ref 11.5–14.5)
GLOBULIN SER CALC-MCNC: 4.7 G/DL (ref 2–4)
GLUCOSE SERPL-MCNC: 103 MG/DL (ref 65–100)
GLUCOSE UR STRIP.AUTO-MCNC: NEGATIVE MG/DL
GRAN CASTS URNS QL MICRO: ABNORMAL /LPF
HCT VFR BLD AUTO: 36.5 % (ref 35–47)
HGB BLD-MCNC: 11 G/DL (ref 11.5–16)
HGB UR QL STRIP: ABNORMAL
IMM GRANULOCYTES # BLD AUTO: 0 K/UL
IMM GRANULOCYTES NFR BLD AUTO: 0 %
KETONES UR QL STRIP.AUTO: ABNORMAL MG/DL
LACTATE SERPL-SCNC: 1.7 MMOL/L (ref 0.4–2)
LACTATE SERPL-SCNC: 2.2 MMOL/L (ref 0.4–2)
LEUKOCYTE ESTERASE UR QL STRIP.AUTO: ABNORMAL
LYMPHOCYTES # BLD: 1.7 K/UL (ref 0.8–3.5)
LYMPHOCYTES NFR BLD: 14 % (ref 12–49)
MCH RBC QN AUTO: 27.2 PG (ref 26–34)
MCHC RBC AUTO-ENTMCNC: 30.1 G/DL (ref 30–36.5)
MCV RBC AUTO: 90.3 FL (ref 80–99)
MONOCYTES # BLD: 0.5 K/UL (ref 0–1)
MONOCYTES NFR BLD: 4 % (ref 5–13)
NEUTS SEG # BLD: 9.8 K/UL (ref 1.8–8)
NEUTS SEG NFR BLD: 82 % (ref 32–75)
NITRITE UR QL STRIP.AUTO: NEGATIVE
NRBC # BLD: 0 K/UL (ref 0–0.01)
NRBC BLD-RTO: 0 PER 100 WBC
PH UR STRIP: 5 (ref 5–8)
PLATELET # BLD AUTO: 226 K/UL (ref 150–400)
PMV BLD AUTO: 9.6 FL (ref 8.9–12.9)
POTASSIUM SERPL-SCNC: 4.1 MMOL/L (ref 3.5–5.1)
PROT SERPL-MCNC: 7.3 G/DL (ref 6.4–8.2)
PROT UR STRIP-MCNC: 300 MG/DL
RBC # BLD AUTO: 4.04 M/UL (ref 3.8–5.2)
RBC #/AREA URNS HPF: ABNORMAL /HPF (ref 0–5)
RBC MORPH BLD: ABNORMAL
SAMPLES BEING HELD,HOLD: NORMAL
SODIUM SERPL-SCNC: 135 MMOL/L (ref 136–145)
SP GR UR REFRACTOMETRY: 1.02 (ref 1–1.03)
UR CULT HOLD, URHOLD: NORMAL
UROBILINOGEN UR QL STRIP.AUTO: 1 EU/DL (ref 0.2–1)
WBC # BLD AUTO: 12 K/UL (ref 3.6–11)
WBC URNS QL MICRO: >100 /HPF (ref 0–4)

## 2023-04-22 PROCEDURE — 74011250636 HC RX REV CODE- 250/636: Performed by: EMERGENCY MEDICINE

## 2023-04-22 PROCEDURE — 76060000032 HC ANESTHESIA 0.5 TO 1 HR: Performed by: UROLOGY

## 2023-04-22 PROCEDURE — 77030019927 HC TBNG IRR CYSTO BAXT -A: Performed by: UROLOGY

## 2023-04-22 PROCEDURE — 74176 CT ABD & PELVIS W/O CONTRAST: CPT

## 2023-04-22 PROCEDURE — 74011250636 HC RX REV CODE- 250/636: Performed by: ANESTHESIOLOGY

## 2023-04-22 PROCEDURE — C2617 STENT, NON-COR, TEM W/O DEL: HCPCS | Performed by: UROLOGY

## 2023-04-22 PROCEDURE — 99285 EMERGENCY DEPT VISIT HI MDM: CPT

## 2023-04-22 PROCEDURE — 76010000138 HC OR TIME 0.5 TO 1 HR: Performed by: UROLOGY

## 2023-04-22 PROCEDURE — 76210000016 HC OR PH I REC 1 TO 1.5 HR: Performed by: UROLOGY

## 2023-04-22 PROCEDURE — 87150 DNA/RNA AMPLIFIED PROBE: CPT

## 2023-04-22 PROCEDURE — 74011000250 HC RX REV CODE- 250: Performed by: NURSE ANESTHETIST, CERTIFIED REGISTERED

## 2023-04-22 PROCEDURE — 2709999900 HC NON-CHARGEABLE SUPPLY: Performed by: UROLOGY

## 2023-04-22 PROCEDURE — 77030040830 HC CATH URETH FOL MDII -A: Performed by: UROLOGY

## 2023-04-22 PROCEDURE — 87040 BLOOD CULTURE FOR BACTERIA: CPT

## 2023-04-22 PROCEDURE — 0T778DZ DILATION OF LEFT URETER WITH INTRALUMINAL DEVICE, VIA NATURAL OR ARTIFICIAL OPENING ENDOSCOPIC: ICD-10-PCS | Performed by: UROLOGY

## 2023-04-22 PROCEDURE — 74011000636 HC RX REV CODE- 636: Performed by: UROLOGY

## 2023-04-22 PROCEDURE — P9045 ALBUMIN (HUMAN), 5%, 250 ML: HCPCS | Performed by: ANESTHESIOLOGY

## 2023-04-22 PROCEDURE — 74011000250 HC RX REV CODE- 250: Performed by: EMERGENCY MEDICINE

## 2023-04-22 PROCEDURE — 87077 CULTURE AEROBIC IDENTIFY: CPT

## 2023-04-22 PROCEDURE — C1758 CATHETER, URETERAL: HCPCS | Performed by: UROLOGY

## 2023-04-22 PROCEDURE — 74011250636 HC RX REV CODE- 250/636: Performed by: NURSE ANESTHETIST, CERTIFIED REGISTERED

## 2023-04-22 PROCEDURE — 85025 COMPLETE CBC W/AUTO DIFF WBC: CPT

## 2023-04-22 PROCEDURE — BT1F1ZZ FLUOROSCOPY OF LEFT KIDNEY, URETER AND BLADDER USING LOW OSMOLAR CONTRAST: ICD-10-PCS | Performed by: UROLOGY

## 2023-04-22 PROCEDURE — 87086 URINE CULTURE/COLONY COUNT: CPT

## 2023-04-22 PROCEDURE — 83605 ASSAY OF LACTIC ACID: CPT

## 2023-04-22 PROCEDURE — 96374 THER/PROPH/DIAG INJ IV PUSH: CPT

## 2023-04-22 PROCEDURE — 81001 URINALYSIS AUTO W/SCOPE: CPT

## 2023-04-22 PROCEDURE — C1769 GUIDE WIRE: HCPCS | Performed by: UROLOGY

## 2023-04-22 PROCEDURE — 77030008684 HC TU ET CUF COVD -B: Performed by: ANESTHESIOLOGY

## 2023-04-22 PROCEDURE — 65270000046 HC RM TELEMETRY

## 2023-04-22 PROCEDURE — 74011250637 HC RX REV CODE- 250/637: Performed by: EMERGENCY MEDICINE

## 2023-04-22 PROCEDURE — 77030026438 HC STYL ET INTUB CARD -A: Performed by: ANESTHESIOLOGY

## 2023-04-22 PROCEDURE — 36415 COLL VENOUS BLD VENIPUNCTURE: CPT

## 2023-04-22 PROCEDURE — 96375 TX/PRO/DX INJ NEW DRUG ADDON: CPT

## 2023-04-22 PROCEDURE — 87186 SC STD MICRODIL/AGAR DIL: CPT

## 2023-04-22 PROCEDURE — 93005 ELECTROCARDIOGRAM TRACING: CPT

## 2023-04-22 PROCEDURE — 80053 COMPREHEN METABOLIC PANEL: CPT

## 2023-04-22 DEVICE — URETERAL STENT
Type: IMPLANTABLE DEVICE | Status: FUNCTIONAL
Brand: CONTOUR™

## 2023-04-22 RX ORDER — PROPOFOL 10 MG/ML
INJECTION, EMULSION INTRAVENOUS AS NEEDED
Status: DISCONTINUED | OUTPATIENT
Start: 2023-04-22 | End: 2023-04-22 | Stop reason: HOSPADM

## 2023-04-22 RX ORDER — SODIUM CHLORIDE 0.9 % (FLUSH) 0.9 %
5-40 SYRINGE (ML) INJECTION AS NEEDED
Status: DISCONTINUED | OUTPATIENT
Start: 2023-04-22 | End: 2023-04-22 | Stop reason: HOSPADM

## 2023-04-22 RX ORDER — SODIUM CHLORIDE, SODIUM LACTATE, POTASSIUM CHLORIDE, CALCIUM CHLORIDE 600; 310; 30; 20 MG/100ML; MG/100ML; MG/100ML; MG/100ML
125 INJECTION, SOLUTION INTRAVENOUS CONTINUOUS
Status: DISCONTINUED | OUTPATIENT
Start: 2023-04-22 | End: 2023-04-22

## 2023-04-22 RX ORDER — DILTIAZEM HYDROCHLORIDE 240 MG/1
240 CAPSULE, COATED, EXTENDED RELEASE ORAL DAILY
Status: DISCONTINUED | OUTPATIENT
Start: 2023-04-23 | End: 2023-04-28 | Stop reason: HOSPADM

## 2023-04-22 RX ORDER — SODIUM CHLORIDE 0.9 % (FLUSH) 0.9 %
5-40 SYRINGE (ML) INJECTION EVERY 8 HOURS
Status: DISCONTINUED | OUTPATIENT
Start: 2023-04-22 | End: 2023-04-22 | Stop reason: HOSPADM

## 2023-04-22 RX ORDER — SODIUM CHLORIDE 0.9 % (FLUSH) 0.9 %
5-40 SYRINGE (ML) INJECTION AS NEEDED
Status: DISCONTINUED | OUTPATIENT
Start: 2023-04-22 | End: 2023-04-28 | Stop reason: HOSPADM

## 2023-04-22 RX ORDER — ONDANSETRON 2 MG/ML
4 INJECTION INTRAMUSCULAR; INTRAVENOUS
Status: DISCONTINUED | OUTPATIENT
Start: 2023-04-22 | End: 2023-04-28 | Stop reason: HOSPADM

## 2023-04-22 RX ORDER — ONDANSETRON 2 MG/ML
4 INJECTION INTRAMUSCULAR; INTRAVENOUS ONCE
Status: COMPLETED | OUTPATIENT
Start: 2023-04-22 | End: 2023-04-22

## 2023-04-22 RX ORDER — ONDANSETRON 4 MG/1
4 TABLET, ORALLY DISINTEGRATING ORAL
Status: DISCONTINUED | OUTPATIENT
Start: 2023-04-22 | End: 2023-04-28 | Stop reason: HOSPADM

## 2023-04-22 RX ORDER — MIDAZOLAM HYDROCHLORIDE 1 MG/ML
INJECTION, SOLUTION INTRAMUSCULAR; INTRAVENOUS AS NEEDED
Status: DISCONTINUED | OUTPATIENT
Start: 2023-04-22 | End: 2023-04-22 | Stop reason: HOSPADM

## 2023-04-22 RX ORDER — POLYETHYLENE GLYCOL 3350 17 G/17G
17 POWDER, FOR SOLUTION ORAL DAILY PRN
Status: DISCONTINUED | OUTPATIENT
Start: 2023-04-22 | End: 2023-04-28 | Stop reason: HOSPADM

## 2023-04-22 RX ORDER — ALBUMIN HUMAN 50 G/1000ML
SOLUTION INTRAVENOUS
Status: DISPENSED
Start: 2023-04-22 | End: 2023-04-23

## 2023-04-22 RX ORDER — LIDOCAINE HYDROCHLORIDE 20 MG/ML
INJECTION, SOLUTION EPIDURAL; INFILTRATION; INTRACAUDAL; PERINEURAL AS NEEDED
Status: DISCONTINUED | OUTPATIENT
Start: 2023-04-22 | End: 2023-04-22 | Stop reason: HOSPADM

## 2023-04-22 RX ORDER — ACETAMINOPHEN 325 MG/1
650 TABLET ORAL
Status: DISCONTINUED | OUTPATIENT
Start: 2023-04-22 | End: 2023-04-28 | Stop reason: HOSPADM

## 2023-04-22 RX ORDER — DULOXETIN HYDROCHLORIDE 60 MG/1
60 CAPSULE, DELAYED RELEASE ORAL
Status: DISCONTINUED | OUTPATIENT
Start: 2023-04-23 | End: 2023-04-28 | Stop reason: HOSPADM

## 2023-04-22 RX ORDER — DIPHENHYDRAMINE HYDROCHLORIDE 50 MG/ML
12.5 INJECTION, SOLUTION INTRAMUSCULAR; INTRAVENOUS AS NEEDED
Status: DISCONTINUED | OUTPATIENT
Start: 2023-04-22 | End: 2023-04-22 | Stop reason: HOSPADM

## 2023-04-22 RX ORDER — HYDROCODONE BITARTRATE AND ACETAMINOPHEN 5; 325 MG/1; MG/1
1 TABLET ORAL AS NEEDED
Status: DISCONTINUED | OUTPATIENT
Start: 2023-04-22 | End: 2023-04-22 | Stop reason: HOSPADM

## 2023-04-22 RX ORDER — KETOROLAC TROMETHAMINE 30 MG/ML
30 INJECTION, SOLUTION INTRAMUSCULAR; INTRAVENOUS ONCE
Status: COMPLETED | OUTPATIENT
Start: 2023-04-22 | End: 2023-04-22

## 2023-04-22 RX ORDER — ACETAMINOPHEN 500 MG
1000 TABLET ORAL ONCE
Status: COMPLETED | OUTPATIENT
Start: 2023-04-22 | End: 2023-04-22

## 2023-04-22 RX ORDER — LEVOTHYROXINE SODIUM 125 UG/1
125 TABLET ORAL
Status: DISCONTINUED | OUTPATIENT
Start: 2023-04-22 | End: 2023-04-28 | Stop reason: HOSPADM

## 2023-04-22 RX ORDER — CALCIUM CARBONATE 500(1250)
1000 TABLET ORAL
Status: DISCONTINUED | OUTPATIENT
Start: 2023-04-22 | End: 2023-04-28 | Stop reason: HOSPADM

## 2023-04-22 RX ORDER — SODIUM CHLORIDE, SODIUM LACTATE, POTASSIUM CHLORIDE, CALCIUM CHLORIDE 600; 310; 30; 20 MG/100ML; MG/100ML; MG/100ML; MG/100ML
INJECTION, SOLUTION INTRAVENOUS
Status: DISCONTINUED | OUTPATIENT
Start: 2023-04-22 | End: 2023-04-22 | Stop reason: HOSPADM

## 2023-04-22 RX ORDER — NORETHINDRONE AND ETHINYL ESTRADIOL 0.5-0.035
10 KIT ORAL ONCE
Status: DISCONTINUED | OUTPATIENT
Start: 2023-04-22 | End: 2023-04-22 | Stop reason: HOSPADM

## 2023-04-22 RX ORDER — SODIUM CHLORIDE 0.9 % (FLUSH) 0.9 %
5-40 SYRINGE (ML) INJECTION EVERY 8 HOURS
Status: DISCONTINUED | OUTPATIENT
Start: 2023-04-22 | End: 2023-04-28 | Stop reason: HOSPADM

## 2023-04-22 RX ORDER — SODIUM CHLORIDE, SODIUM LACTATE, POTASSIUM CHLORIDE, CALCIUM CHLORIDE 600; 310; 30; 20 MG/100ML; MG/100ML; MG/100ML; MG/100ML
25 INJECTION, SOLUTION INTRAVENOUS CONTINUOUS
Status: DISCONTINUED | OUTPATIENT
Start: 2023-04-22 | End: 2023-04-22 | Stop reason: HOSPADM

## 2023-04-22 RX ORDER — ROCURONIUM BROMIDE 10 MG/ML
INJECTION, SOLUTION INTRAVENOUS AS NEEDED
Status: DISCONTINUED | OUTPATIENT
Start: 2023-04-22 | End: 2023-04-22 | Stop reason: HOSPADM

## 2023-04-22 RX ORDER — NEOSTIGMINE METHYLSULFATE 1 MG/ML
INJECTION, SOLUTION INTRAVENOUS AS NEEDED
Status: DISCONTINUED | OUTPATIENT
Start: 2023-04-22 | End: 2023-04-22 | Stop reason: HOSPADM

## 2023-04-22 RX ORDER — SODIUM CHLORIDE, SODIUM LACTATE, POTASSIUM CHLORIDE, CALCIUM CHLORIDE 600; 310; 30; 20 MG/100ML; MG/100ML; MG/100ML; MG/100ML
75 INJECTION, SOLUTION INTRAVENOUS CONTINUOUS
Status: DISCONTINUED | OUTPATIENT
Start: 2023-04-22 | End: 2023-04-28 | Stop reason: HOSPADM

## 2023-04-22 RX ORDER — HYDROMORPHONE HYDROCHLORIDE 1 MG/ML
1 INJECTION, SOLUTION INTRAMUSCULAR; INTRAVENOUS; SUBCUTANEOUS
Status: DISCONTINUED | OUTPATIENT
Start: 2023-04-22 | End: 2023-04-28 | Stop reason: HOSPADM

## 2023-04-22 RX ORDER — ALBUMIN HUMAN 50 G/1000ML
12.5 SOLUTION INTRAVENOUS ONCE
Status: COMPLETED | OUTPATIENT
Start: 2023-04-22 | End: 2023-04-22

## 2023-04-22 RX ORDER — MIDAZOLAM HYDROCHLORIDE 1 MG/ML
0.5 INJECTION, SOLUTION INTRAMUSCULAR; INTRAVENOUS
Status: DISCONTINUED | OUTPATIENT
Start: 2023-04-22 | End: 2023-04-22 | Stop reason: HOSPADM

## 2023-04-22 RX ORDER — FENTANYL CITRATE 50 UG/ML
25 INJECTION, SOLUTION INTRAMUSCULAR; INTRAVENOUS
Status: DISCONTINUED | OUTPATIENT
Start: 2023-04-22 | End: 2023-04-22 | Stop reason: HOSPADM

## 2023-04-22 RX ORDER — ACETAMINOPHEN 650 MG/1
650 SUPPOSITORY RECTAL
Status: DISCONTINUED | OUTPATIENT
Start: 2023-04-22 | End: 2023-04-28 | Stop reason: HOSPADM

## 2023-04-22 RX ORDER — FENTANYL CITRATE 50 UG/ML
INJECTION, SOLUTION INTRAMUSCULAR; INTRAVENOUS AS NEEDED
Status: DISCONTINUED | OUTPATIENT
Start: 2023-04-22 | End: 2023-04-22 | Stop reason: HOSPADM

## 2023-04-22 RX ORDER — ONDANSETRON 2 MG/ML
4 INJECTION INTRAMUSCULAR; INTRAVENOUS AS NEEDED
Status: DISCONTINUED | OUTPATIENT
Start: 2023-04-22 | End: 2023-04-22 | Stop reason: HOSPADM

## 2023-04-22 RX ORDER — HYDROMORPHONE HYDROCHLORIDE 1 MG/ML
0.2 INJECTION, SOLUTION INTRAMUSCULAR; INTRAVENOUS; SUBCUTANEOUS
Status: DISCONTINUED | OUTPATIENT
Start: 2023-04-22 | End: 2023-04-22 | Stop reason: HOSPADM

## 2023-04-22 RX ORDER — ONDANSETRON 2 MG/ML
INJECTION INTRAMUSCULAR; INTRAVENOUS AS NEEDED
Status: DISCONTINUED | OUTPATIENT
Start: 2023-04-22 | End: 2023-04-22 | Stop reason: HOSPADM

## 2023-04-22 RX ORDER — SUCCINYLCHOLINE CHLORIDE 20 MG/ML
INJECTION INTRAMUSCULAR; INTRAVENOUS AS NEEDED
Status: DISCONTINUED | OUTPATIENT
Start: 2023-04-22 | End: 2023-04-22 | Stop reason: HOSPADM

## 2023-04-22 RX ORDER — PANTOPRAZOLE SODIUM 40 MG/1
40 TABLET, DELAYED RELEASE ORAL DAILY
Status: DISCONTINUED | OUTPATIENT
Start: 2023-04-23 | End: 2023-04-28 | Stop reason: HOSPADM

## 2023-04-22 RX ORDER — OXYCODONE AND ACETAMINOPHEN 5; 325 MG/1; MG/1
1 TABLET ORAL
Status: DISCONTINUED | OUTPATIENT
Start: 2023-04-22 | End: 2023-04-28 | Stop reason: HOSPADM

## 2023-04-22 RX ORDER — GLYCOPYRROLATE 0.2 MG/ML
INJECTION INTRAMUSCULAR; INTRAVENOUS AS NEEDED
Status: DISCONTINUED | OUTPATIENT
Start: 2023-04-22 | End: 2023-04-22 | Stop reason: HOSPADM

## 2023-04-22 RX ADMIN — PROPOFOL 150 MG: 10 INJECTION, EMULSION INTRAVENOUS at 20:10

## 2023-04-22 RX ADMIN — Medication 2 MG: at 20:33

## 2023-04-22 RX ADMIN — MIDAZOLAM 2 MG: 1 INJECTION INTRAMUSCULAR; INTRAVENOUS at 20:05

## 2023-04-22 RX ADMIN — FENTANYL CITRATE 50 MCG: 50 INJECTION, SOLUTION INTRAMUSCULAR; INTRAVENOUS at 20:10

## 2023-04-22 RX ADMIN — SODIUM CHLORIDE, POTASSIUM CHLORIDE, SODIUM LACTATE AND CALCIUM CHLORIDE 125 ML/HR: 600; 310; 30; 20 INJECTION, SOLUTION INTRAVENOUS at 22:00

## 2023-04-22 RX ADMIN — ONDANSETRON 4 MG: 2 INJECTION INTRAMUSCULAR; INTRAVENOUS at 15:34

## 2023-04-22 RX ADMIN — ACETAMINOPHEN 1000 MG: 500 TABLET ORAL at 17:04

## 2023-04-22 RX ADMIN — SODIUM CHLORIDE 1 G: 9 INJECTION INTRAMUSCULAR; INTRAVENOUS; SUBCUTANEOUS at 17:04

## 2023-04-22 RX ADMIN — SODIUM CHLORIDE 1000 ML: 9 INJECTION, SOLUTION INTRAVENOUS at 15:31

## 2023-04-22 RX ADMIN — KETOROLAC TROMETHAMINE 30 MG: 30 INJECTION, SOLUTION INTRAMUSCULAR at 15:34

## 2023-04-22 RX ADMIN — SUCCINYLCHOLINE CHLORIDE 140 MG: 20 INJECTION, SOLUTION INTRAMUSCULAR; INTRAVENOUS at 20:10

## 2023-04-22 RX ADMIN — ROCURONIUM BROMIDE 10 MG: 10 SOLUTION INTRAVENOUS at 20:10

## 2023-04-22 RX ADMIN — ALBUMIN (HUMAN) 12.5 G: 12.5 INJECTION, SOLUTION INTRAVENOUS at 21:33

## 2023-04-22 RX ADMIN — ONDANSETRON HYDROCHLORIDE 4 MG: 2 INJECTION, SOLUTION INTRAMUSCULAR; INTRAVENOUS at 20:25

## 2023-04-22 RX ADMIN — LIDOCAINE HYDROCHLORIDE 100 MG: 20 INJECTION, SOLUTION EPIDURAL; INFILTRATION; INTRACAUDAL; PERINEURAL at 20:10

## 2023-04-22 RX ADMIN — SODIUM CHLORIDE, POTASSIUM CHLORIDE, SODIUM LACTATE AND CALCIUM CHLORIDE: 600; 310; 30; 20 INJECTION, SOLUTION INTRAVENOUS at 20:05

## 2023-04-22 RX ADMIN — GLYCOPYRROLATE 0.4 MG: 0.2 INJECTION INTRAMUSCULAR; INTRAVENOUS at 20:33

## 2023-04-22 RX ADMIN — FENTANYL CITRATE 50 MCG: 50 INJECTION, SOLUTION INTRAMUSCULAR; INTRAVENOUS at 20:23

## 2023-04-22 NOTE — ANESTHESIA PREPROCEDURE EVALUATION
Relevant Problems   No relevant active problems       Anesthetic History               Review of Systems / Medical History  Patient summary reviewed, nursing notes reviewed and pertinent labs reviewed    Pulmonary                   Neuro/Psych         Psychiatric history     Cardiovascular            Dysrhythmias : atrial fibrillation      Exercise tolerance: <4 METS  Comments: Echo 2017    LEFT VENTRICLE: Size was normal. Systolic function was normal. Ejection  fraction was estimated in the range of 55 % to 60 %. There were no  regional wall motion abnormalities. Wall thickness was normal.     RIGHT VENTRICLE: The size was normal. Systolic function was normal. Wall  thickness was normal.    Stress test 2019     Baseline ECG: Normal EKG. Gated SPECT: Left ventricular function post-stress was normal. Calculated ejection fraction is 58%. There is no evidence of transient ischemic dilation (TID). Left ventricular perfusion is normal. Negative myocardial perfusion imaging.  Myocardial perfusion imaging supports a low risk stress test        GI/Hepatic/Renal                Endo/Other    Diabetes  Hypothyroidism  Morbid obesity and arthritis     Other Findings   Comments: 04/22/23 13:09  HGB: 11.0 (L)  HCT: 36.5  MCV: 90.3  MCH: 27.2  MCHC: 30.1  RDW: 15.9 (H)  PLATELET: 050    67/68/59 13:09  Sodium: 135 (L)  Potassium: 4.1  Chloride: 102  CO2: 28  Anion gap: 5  Glucose: 103 (H)  BUN: 49 (H)  Creatinine: 3.06 (H)             Physical Exam    Airway  Mallampati: II  TM Distance: 4 - 6 cm         Cardiovascular  Regular rate and rhythm,  S1 and S2 normal,  no murmur, click, rub, or gallop             Dental         Pulmonary                 Abdominal         Other Findings            Anesthetic Plan    ASA: 3  Anesthesia type: general          Induction: Intravenous  Anesthetic plan and risks discussed with: Patient

## 2023-04-23 ENCOUNTER — APPOINTMENT (OUTPATIENT)
Dept: GENERAL RADIOLOGY | Age: 63
DRG: 853 | End: 2023-04-23
Attending: INTERNAL MEDICINE
Payer: COMMERCIAL

## 2023-04-23 LAB
ACC. NO. FROM MICRO ORDER, ACCP: ABNORMAL
ACINETOBACTER CALCOACETICUS-BAUMANII COMPLEX, ACBCX: NOT DETECTED
ALBUMIN SERPL-MCNC: 2.5 G/DL (ref 3.5–5)
ALBUMIN/GLOB SERPL: 0.5 (ref 1.1–2.2)
ALP SERPL-CCNC: 108 U/L (ref 45–117)
ALT SERPL-CCNC: 32 U/L (ref 12–78)
ANION GAP SERPL CALC-SCNC: 5 MMOL/L (ref 5–15)
AST SERPL-CCNC: 25 U/L (ref 15–37)
ATRIAL RATE: 91 BPM
B FRAGILIS DNA BLD POS QL NAA+NON-PROBE: NOT DETECTED
BASOPHILS # BLD: 0 K/UL (ref 0–0.1)
BASOPHILS NFR BLD: 0 % (ref 0–1)
BILIRUB SERPL-MCNC: 0.8 MG/DL (ref 0.2–1)
BIOFIRE COMMENT, BCIDPF: ABNORMAL
BLACTX-M ISLT/SPM QL: NOT DETECTED
BLAIMP ISLT/SPM QL: NOT DETECTED
BLAKPC BLD POS QL NAA+NON-PROBE: NOT DETECTED
BLAOXA-48-LIKE ISLT/SPM QL: NOT DETECTED
BLAVIM ISLT/SPM QL: NOT DETECTED
BUN SERPL-MCNC: 59 MG/DL (ref 6–20)
BUN/CREAT SERPL: 16 (ref 12–20)
C ALBICANS DNA BLD POS QL NAA+NON-PROBE: NOT DETECTED
C AURIS DNA BLD POS QL NAA+NON-PROBE: NOT DETECTED
C GATTII+NEOFOR DNA BLD POS QL NAA+N-PRB: NOT DETECTED
C GLABRATA DNA BLD POS QL NAA+NON-PROBE: NOT DETECTED
C KRUSEI DNA BLD POS QL NAA+NON-PROBE: NOT DETECTED
C PARAP DNA BLD POS QL NAA+NON-PROBE: NOT DETECTED
C TROPICLS DNA BLD POS QL NAA+NON-PROBE: NOT DETECTED
CALCIUM SERPL-MCNC: 8.5 MG/DL (ref 8.5–10.1)
CALCULATED P AXIS, ECG09: 70 DEGREES
CALCULATED R AXIS, ECG10: 29 DEGREES
CALCULATED T AXIS, ECG11: 32 DEGREES
CHLORIDE SERPL-SCNC: 106 MMOL/L (ref 97–108)
CO2 SERPL-SCNC: 24 MMOL/L (ref 21–32)
COLISTIN RES MCR-1 ISLT/SPM QL: NOT DETECTED
CREAT SERPL-MCNC: 3.71 MG/DL (ref 0.55–1.02)
DIAGNOSIS, 93000: NORMAL
DIFFERENTIAL METHOD BLD: ABNORMAL
E CLOAC COMP DNA BLD POS NAA+NON-PROBE: NOT DETECTED
E COLI DNA BLD POS QL NAA+NON-PROBE: DETECTED
E FAECALIS DNA BLD POS QL NAA+NON-PROBE: NOT DETECTED
E FAECIUM DNA BLD POS QL NAA+NON-PROBE: NOT DETECTED
ENTEROBACTERALES DNA BLD POS NAA+N-PRB: DETECTED
EOSINOPHIL # BLD: 0.1 K/UL (ref 0–0.4)
EOSINOPHIL NFR BLD: 1 % (ref 0–7)
ERYTHROCYTE [DISTWIDTH] IN BLOOD BY AUTOMATED COUNT: 16 % (ref 11.5–14.5)
GLOBULIN SER CALC-MCNC: 4.6 G/DL (ref 2–4)
GLUCOSE SERPL-MCNC: 94 MG/DL (ref 65–100)
GP B STREP DNA BLD POS QL NAA+NON-PROBE: NOT DETECTED
HAEM INFLU DNA BLD POS QL NAA+NON-PROBE: NOT DETECTED
HCT VFR BLD AUTO: 36.3 % (ref 35–47)
HGB BLD-MCNC: 10.8 G/DL (ref 11.5–16)
IMM GRANULOCYTES # BLD AUTO: 0 K/UL
IMM GRANULOCYTES NFR BLD AUTO: 0 %
K OXYTOCA DNA BLD POS QL NAA+NON-PROBE: NOT DETECTED
KLEBSIELLA SP DNA BLD POS QL NAA+NON-PRB: NOT DETECTED
KLEBSIELLA SP DNA BLD POS QL NAA+NON-PRB: NOT DETECTED
L MONOCYTOG DNA BLD POS QL NAA+NON-PROBE: NOT DETECTED
LYMPHOCYTES # BLD: 1 K/UL (ref 0.8–3.5)
LYMPHOCYTES NFR BLD: 11 % (ref 12–49)
MAGNESIUM SERPL-MCNC: 1.9 MG/DL (ref 1.6–2.4)
MCH RBC QN AUTO: 26.8 PG (ref 26–34)
MCHC RBC AUTO-ENTMCNC: 29.8 G/DL (ref 30–36.5)
MCV RBC AUTO: 90.1 FL (ref 80–99)
MONOCYTES # BLD: 0.5 K/UL (ref 0–1)
MONOCYTES NFR BLD: 5 % (ref 5–13)
N MEN DNA BLD POS QL NAA+NON-PROBE: NOT DETECTED
NDM (CARBAPENEMASE RESISTANT GENE), NDM: NOT DETECTED
NEUTS SEG # BLD: 7.6 K/UL (ref 1.8–8)
NEUTS SEG NFR BLD: 83 % (ref 32–75)
NRBC # BLD: 0 K/UL (ref 0–0.01)
NRBC BLD-RTO: 0 PER 100 WBC
P AERUGINOSA DNA BLD POS NAA+NON-PROBE: NOT DETECTED
P-R INTERVAL, ECG05: 130 MS
PHOSPHATE SERPL-MCNC: 2.8 MG/DL (ref 2.6–4.7)
PLATELET # BLD AUTO: 173 K/UL (ref 150–400)
PMV BLD AUTO: 9.5 FL (ref 8.9–12.9)
POTASSIUM SERPL-SCNC: 4 MMOL/L (ref 3.5–5.1)
PROT SERPL-MCNC: 7.1 G/DL (ref 6.4–8.2)
PROTEUS SP DNA BLD POS QL NAA+NON-PROBE: NOT DETECTED
Q-T INTERVAL, ECG07: 362 MS
QRS DURATION, ECG06: 96 MS
QTC CALCULATION (BEZET), ECG08: 445 MS
RBC # BLD AUTO: 4.03 M/UL (ref 3.8–5.2)
RBC MORPH BLD: ABNORMAL
RESISTANT GENE SPACE, REGENE: ABNORMAL
S AUREUS DNA BLD POS QL NAA+NON-PROBE: NOT DETECTED
S AUREUS+CONS DNA BLD POS NAA+NON-PROBE: NOT DETECTED
S EPIDERMIDIS DNA BLD POS QL NAA+NON-PRB: NOT DETECTED
S LUGDUNENSIS DNA BLD POS QL NAA+NON-PRB: NOT DETECTED
S MALTOPHILIA DNA BLD POS QL NAA+NON-PRB: NOT DETECTED
S MARCESCENS DNA BLD POS NAA+NON-PROBE: NOT DETECTED
S PNEUM DNA BLD POS QL NAA+NON-PROBE: NOT DETECTED
S PYO DNA BLD POS QL NAA+NON-PROBE: NOT DETECTED
SALMONELLA DNA BLD POS QL NAA+NON-PROBE: NOT DETECTED
SODIUM SERPL-SCNC: 135 MMOL/L (ref 136–145)
STREPTOCOCCUS DNA BLD POS NAA+NON-PROBE: NOT DETECTED
TSH SERPL DL<=0.05 MIU/L-ACNC: 0.79 UIU/ML (ref 0.36–3.74)
VENTRICULAR RATE, ECG03: 91 BPM
WBC # BLD AUTO: 9.2 K/UL (ref 3.6–11)

## 2023-04-23 PROCEDURE — 74011000250 HC RX REV CODE- 250: Performed by: FAMILY MEDICINE

## 2023-04-23 PROCEDURE — 83735 ASSAY OF MAGNESIUM: CPT

## 2023-04-23 PROCEDURE — 65270000046 HC RM TELEMETRY

## 2023-04-23 PROCEDURE — 85025 COMPLETE CBC W/AUTO DIFF WBC: CPT

## 2023-04-23 PROCEDURE — 84443 ASSAY THYROID STIM HORMONE: CPT

## 2023-04-23 PROCEDURE — 84100 ASSAY OF PHOSPHORUS: CPT

## 2023-04-23 PROCEDURE — 80053 COMPREHEN METABOLIC PANEL: CPT

## 2023-04-23 PROCEDURE — 74011250637 HC RX REV CODE- 250/637: Performed by: INTERNAL MEDICINE

## 2023-04-23 PROCEDURE — 71045 X-RAY EXAM CHEST 1 VIEW: CPT

## 2023-04-23 PROCEDURE — 74011250636 HC RX REV CODE- 250/636: Performed by: FAMILY MEDICINE

## 2023-04-23 PROCEDURE — 36415 COLL VENOUS BLD VENIPUNCTURE: CPT

## 2023-04-23 PROCEDURE — 74011000250 HC RX REV CODE- 250: Performed by: INTERNAL MEDICINE

## 2023-04-23 RX ADMIN — SODIUM CHLORIDE, PRESERVATIVE FREE 10 ML: 5 INJECTION INTRAVENOUS at 06:40

## 2023-04-23 RX ADMIN — LEVOTHYROXINE SODIUM 125 MCG: 0.12 TABLET ORAL at 00:13

## 2023-04-23 RX ADMIN — PANTOPRAZOLE SODIUM 40 MG: 40 TABLET, DELAYED RELEASE ORAL at 08:46

## 2023-04-23 RX ADMIN — Medication 1000 MG: at 00:13

## 2023-04-23 RX ADMIN — Medication 1000 MG: at 21:20

## 2023-04-23 RX ADMIN — SODIUM CHLORIDE, PRESERVATIVE FREE 10 ML: 5 INJECTION INTRAVENOUS at 21:20

## 2023-04-23 RX ADMIN — ACETAMINOPHEN 650 MG: 325 TABLET, FILM COATED ORAL at 08:47

## 2023-04-23 RX ADMIN — DILTIAZEM HYDROCHLORIDE 240 MG: 240 CAPSULE, EXTENDED RELEASE ORAL at 08:46

## 2023-04-23 RX ADMIN — SODIUM CHLORIDE 2 G: 9 INJECTION INTRAMUSCULAR; INTRAVENOUS; SUBCUTANEOUS at 17:16

## 2023-04-23 RX ADMIN — LEVOTHYROXINE SODIUM 125 MCG: 0.12 TABLET ORAL at 21:20

## 2023-04-23 RX ADMIN — SODIUM CHLORIDE, PRESERVATIVE FREE 10 ML: 5 INJECTION INTRAVENOUS at 00:13

## 2023-04-23 RX ADMIN — OXYCODONE HYDROCHLORIDE AND ACETAMINOPHEN 1 TABLET: 5; 325 TABLET ORAL at 20:23

## 2023-04-23 RX ADMIN — DULOXETINE HYDROCHLORIDE 60 MG: 60 CAPSULE, DELAYED RELEASE ORAL at 06:40

## 2023-04-24 LAB
ANION GAP SERPL CALC-SCNC: 6 MMOL/L (ref 5–15)
BACTERIA SPEC CULT: ABNORMAL
BUN SERPL-MCNC: 57 MG/DL (ref 6–20)
BUN/CREAT SERPL: 20 (ref 12–20)
CALCIUM SERPL-MCNC: 9.1 MG/DL (ref 8.5–10.1)
CC UR VC: ABNORMAL
CHLORIDE SERPL-SCNC: 105 MMOL/L (ref 97–108)
CO2 SERPL-SCNC: 25 MMOL/L (ref 21–32)
CREAT SERPL-MCNC: 2.86 MG/DL (ref 0.55–1.02)
ERYTHROCYTE [DISTWIDTH] IN BLOOD BY AUTOMATED COUNT: 16.4 % (ref 11.5–14.5)
GLUCOSE SERPL-MCNC: 90 MG/DL (ref 65–100)
HCT VFR BLD AUTO: 33.4 % (ref 35–47)
HGB BLD-MCNC: 10 G/DL (ref 11.5–16)
MCH RBC QN AUTO: 26.4 PG (ref 26–34)
MCHC RBC AUTO-ENTMCNC: 29.9 G/DL (ref 30–36.5)
MCV RBC AUTO: 88.1 FL (ref 80–99)
NRBC # BLD: 0 K/UL (ref 0–0.01)
NRBC BLD-RTO: 0 PER 100 WBC
PLATELET # BLD AUTO: 210 K/UL (ref 150–400)
PMV BLD AUTO: 9.6 FL (ref 8.9–12.9)
POTASSIUM SERPL-SCNC: 3.9 MMOL/L (ref 3.5–5.1)
RBC # BLD AUTO: 3.79 M/UL (ref 3.8–5.2)
SERVICE CMNT-IMP: ABNORMAL
SODIUM SERPL-SCNC: 136 MMOL/L (ref 136–145)
WBC # BLD AUTO: 10.2 K/UL (ref 3.6–11)

## 2023-04-24 PROCEDURE — 80048 BASIC METABOLIC PNL TOTAL CA: CPT

## 2023-04-24 PROCEDURE — 74011000250 HC RX REV CODE- 250: Performed by: FAMILY MEDICINE

## 2023-04-24 PROCEDURE — 74011250637 HC RX REV CODE- 250/637: Performed by: NURSE PRACTITIONER

## 2023-04-24 PROCEDURE — 74011250637 HC RX REV CODE- 250/637: Performed by: INTERNAL MEDICINE

## 2023-04-24 PROCEDURE — 74011000250 HC RX REV CODE- 250: Performed by: INTERNAL MEDICINE

## 2023-04-24 PROCEDURE — 74011250636 HC RX REV CODE- 250/636: Performed by: FAMILY MEDICINE

## 2023-04-24 PROCEDURE — 85027 COMPLETE CBC AUTOMATED: CPT

## 2023-04-24 PROCEDURE — 36415 COLL VENOUS BLD VENIPUNCTURE: CPT

## 2023-04-24 PROCEDURE — 65270000046 HC RM TELEMETRY

## 2023-04-24 PROCEDURE — 74011250636 HC RX REV CODE- 250/636: Performed by: ANESTHESIOLOGY

## 2023-04-24 PROCEDURE — 87040 BLOOD CULTURE FOR BACTERIA: CPT

## 2023-04-24 RX ORDER — AMOXICILLIN 250 MG
1 CAPSULE ORAL DAILY
Status: DISCONTINUED | OUTPATIENT
Start: 2023-04-24 | End: 2023-04-28 | Stop reason: HOSPADM

## 2023-04-24 RX ORDER — AMOXICILLIN 250 MG
1 CAPSULE ORAL DAILY
Status: DISCONTINUED | OUTPATIENT
Start: 2023-04-25 | End: 2023-04-24

## 2023-04-24 RX ADMIN — LEVOTHYROXINE SODIUM 125 MCG: 0.12 TABLET ORAL at 22:15

## 2023-04-24 RX ADMIN — DILTIAZEM HYDROCHLORIDE 240 MG: 240 CAPSULE, EXTENDED RELEASE ORAL at 09:23

## 2023-04-24 RX ADMIN — SODIUM CHLORIDE, PRESERVATIVE FREE 10 ML: 5 INJECTION INTRAVENOUS at 22:19

## 2023-04-24 RX ADMIN — SODIUM CHLORIDE, POTASSIUM CHLORIDE, SODIUM LACTATE AND CALCIUM CHLORIDE 125 ML/HR: 600; 310; 30; 20 INJECTION, SOLUTION INTRAVENOUS at 06:36

## 2023-04-24 RX ADMIN — SODIUM CHLORIDE, PRESERVATIVE FREE 10 ML: 5 INJECTION INTRAVENOUS at 06:35

## 2023-04-24 RX ADMIN — SODIUM CHLORIDE 2 G: 9 INJECTION INTRAMUSCULAR; INTRAVENOUS; SUBCUTANEOUS at 17:25

## 2023-04-24 RX ADMIN — ACETAMINOPHEN 650 MG: 325 TABLET, FILM COATED ORAL at 22:15

## 2023-04-24 RX ADMIN — Medication 1000 MG: at 22:15

## 2023-04-24 RX ADMIN — SENNOSIDES AND DOCUSATE SODIUM 1 TABLET: 8.6; 5 TABLET ORAL at 14:30

## 2023-04-24 RX ADMIN — DULOXETINE HYDROCHLORIDE 60 MG: 60 CAPSULE, DELAYED RELEASE ORAL at 06:35

## 2023-04-24 RX ADMIN — PANTOPRAZOLE SODIUM 40 MG: 40 TABLET, DELAYED RELEASE ORAL at 09:23

## 2023-04-24 RX ADMIN — SODIUM CHLORIDE, PRESERVATIVE FREE 10 ML: 5 INJECTION INTRAVENOUS at 14:30

## 2023-04-24 NOTE — PROGRESS NOTES
Transition Of Care: The patient plans to discharge home with family to transport when stable for discharge. CM notified in rounds the patient may need long term IV abx for 2 weeks. CM sent referral to [employer] to check benefits. No HH referral sent at this time. Orders and picc pending. RUR:  13%    The patient is from home and lives with son. Urology, hospitalist following. The patient plans to discharge home. Care Management Interventions  PCP Verified by CM: Yes  Palliative Care Criteria Met (RRAT>21 & CHF Dx)?: No  Mode of Transport at Discharge: Other (see comment)  Transition of Care Consult (CM Consult): Discharge Planning  Physical Therapy Consult: Yes  Occupational Therapy Consult: Yes  Speech Therapy Consult: No  Support Systems: Child(deejay)  Confirm Follow Up Transport: Family  The Plan for Transition of Care is Related to the Following Treatment Goals : The patient plans to discharge home. The Patient and/or Patient Representative was Provided with a Choice of Provider and Agrees with the Discharge Plan?: Yes  Freedom of Choice List was Provided with Basic Dialogue that Supports the Patient's Individualized Plan of Care/Goals, Treatment Preferences and Shares the Quality Data Associated with the Providers?: Yes   Resource Information Provided?: No  Discharge Location  Patient Expects to be Discharged to[de-identified] Home     Reason for Admission:  Acute Pyelonephritis                     RUR Score:   13%                  Plan for utilizing home health:  No indications at this time.          PCP: First and Last name:  Luly Basilio MD     Name of Practice:    Are you a current patient: Yes/No: Y   Approximate date of last visit: Nov, 2022   Can you participate in a virtual visit with your PCP: Y                    Current Advanced Directive/Advance Care Plan: Full Code      Healthcare Decision Maker:   Click here to complete [address] including selection of the Healthcare Decision Maker Relationship (ie \"Primary\")             Primary Decision Maker: Nik George - [phone]                  Transition of Care Plan:      CM met with the patient in room 217-1. The patient is alert and oriented x4. Confirmed demographics. The patient is alert and oriented x4, is independent, drives a vehicle, works in [email or web address] at [employer] and does not own or use any dme. The patient has no history of HH or rehab and plans to discharge home with family to transport when stable for discharge. CM following for discharge needs.     Sharon Carlson RN/CRM

## 2023-04-24 NOTE — PROGRESS NOTES
[address] [facility] Adult  Hospitalist Group                                                                                          Hospitalist Progress Note  Houston Kumar MD  Answering service: [phone] OR [phone] from in house phone        Date of Service:  2023  NAME:  Joe Augustine  :  1960  MRN:  [id number]      Admission Summary: This is a 53-year-old woman with past medical history significant for hypertension, hypothyroidism, rheumatoid arthritis, degenerative disk disease, supraventricular tachycardia, and kidney stone; presented at the emergency room with abdominal pain. This started about 2 days ago. The pain is located at the left flank with radiation to the groin, constant dull ache, 8/10 in severity, no known aggravating or relieving factors. The patient has had kidney stone in the past that required intervention, and the patient stated that this presentation is similar to when she was diagnosed with kidney stone. When the patient arrived at the emergency room, CT scan of the abdomen and pelvis without contrast was obtained. This shows moderate left-sided hydronephrosis due to 5-mm left ureteral stone. The emergency room physician consulted Urology Service on-call and the patient was taken straight to the operating room from the emergency room for cystoscopy and left ureteral stent insertion. The patient was then referred to the hospitalist service for admission. The patient was last admitted to the hospital from 2020 to 2020. The patient was admitted to the Orthopedic Service and underwent elective left total hip arthroplasty secondary to osteoarthritis of the left hip. Interval history / Subjective:   Patient seen and examined, reviewed her vitals, labs, careplan, micro results  Showed the patient her CT scan prior to stent placement  Repeat blood cultures sent today- repeat creatinine ordered     Assessment & Plan:      1.   Gram neg bacteremia from acute pyelonephritis/ left ureteral obstruction(UTI). repeat cultures sent this am-she will need to complete 2 weeks of IV antibotics  2. Left ureteral stone with Moderate left-sided hydronephrosis- stone obstructing left ureter- urology consulted, left ureteral stent placed- further tx to be planned outpatient after treatment of infection. 3.  Hyponatremia- correcting w IVFLuids. 4. Psych- depression- stable on home meds  5. Anemia- due to sepis from infection- monitor hemoglobin  6. Hypertension- hypotensive from sepsis/shock - monitor BP- improved today- restarted diltiazem but continue to hold losartan  7. Hypothyroidism- cont levothyroxine. 8.  Rheumatoid arthritis- restarted home meds. 9.  Acute kidney injury. - monitor with IVFluids     Code status: FULL  Prophylaxis: SCDS  Care Plan discussed with: patient  Anticipated Disposition: > 48hrs     Hospital Problems  Date Reviewed: 4/22/2023            Codes Class Noted POA    * (Principal) Acute pyelonephritis ICD-10-CM: N10  ICD-9-CM: 590.10  4/22/2023 Yes           Review of Systems:   A comprehensive review of systems was negative except for that written in the HPI. Vital Signs:    Last 24hrs VS reviewed since prior progress note.  Most recent are:  Visit Vitals  BP (!) 143/81 (BP 1 Location: Left upper arm, BP Patient Position: Sitting)   Pulse 100   Temp 98.4 °F (36.9 °C)   Resp 18   Ht 5' 8\" (1.727 m)   Wt 120.5 kg (265 lb 10.5 oz)   SpO2 94%   BMI 40.39 kg/m²     Patient Vitals for the past 24 hrs:   Temp Pulse Resp BP SpO2   04/24/23 0758 98.4 °F (36.9 °C) 100 18 (!) 143/81 94 %   04/24/23 0549 -- 95 -- -- --   04/24/23 0355 97.2 °F (36.2 °C) 88 18 129/75 96 %   04/24/23 0351 -- 85 -- -- --   04/24/23 0149 -- 89 -- -- --   04/23/23 2314 98.6 °F (37 °C) 96 16 120/73 93 %   04/23/23 2149 -- (!) 103 -- -- --   04/23/23 2011 -- 96 -- -- --   04/23/23 1929 98.6 °F (37 °C) 95 16 117/74 93 %   04/23/23 1800 -- 96 -- -- --   04/23/23 1747 99.3 °F (37.4 °C) 99 18 (!) 109/59 94 %   04/23/23 1721 98.4 °F (36.9 °C) (!) 103 16 120/76 97 %   04/23/23 1400 -- 94 -- -- --   04/23/23 1339 98 °F (36.7 °C) 93 16 119/71 92 %   04/23/23 1200 -- 97 -- -- --   04/23/23 1000 -- (!) 104 -- -- --   04/23/23 0849 98.3 °F (36.8 °C) (!) 112 16 103/64 96 %   04/23/23 0835 98.9 °F (37.2 °C) (!) 111 18 98/64 91 %          Intake/Output Summary (Last 24 hours) at 4/24/2023 0155  Last data filed at 4/23/2023 1600  Gross per 24 hour   Intake --   Output 500 ml   Net -500 ml          Physical Examination:     I had a face to face encounter with this patient and independently examined them on 4/24/2023 as outlined below:          Constitutional:  No acute distress, cooperative, pleasant    ENT:  Oral mucosa moist, oropharynx benign. Resp:  CTA bilaterally. No wheezing/rhonchi/rales. No accessory muscle use. CV:  Regular rhythm, normal rate, no murmurs, gallops, rubs    GI:  Soft, non distended, non tender. normoactive bowel sounds,     Musculoskeletal:  No edema, warm, 2+ pulses throughout    Neurologic:  Moves all extremities. AAOx3, CN II-XII reviewed            Data Review:    Review and/or order of clinical lab test  Review and/or order of tests in the radiology section of CPT  Review and/or order of tests in the medicine section of CPT  CT Results  (Last 48 hours)                 04/22/23 1656  CT ABD PELV WO CONT Final result    Impression:      1. Moderate left-sided hydronephrosis due to a 5 mm stone proximal left ureter       Narrative:  EXAM: CT ABD PELV WO CONT       INDICATION: kidney stones/ left flank pain       COMPARISON: None       IV CONTRAST: None. ORAL CONTRAST: None       TECHNIQUE:    Thin axial images were obtained through the abdomen and pelvis. Coronal and   sagittal reformats were generated.  CT dose reduction was achieved through use of   a standardized protocol tailored for this examination and automatic exposure   control for dose modulation. The absence of intravenous contrast material reduces the sensitivity for   evaluation of the vasculature and solid organs. FINDINGS:    LOWER THORAX: Patchy basilar atelectasis   LIVER: No mass. BILIARY TREE: Surgically absent CBD is not dilated. SPLEEN: within normal limits. PANCREAS: No focal abnormality. ADRENALS: Unremarkable. KIDNEYS/URETERS: Moderate left-sided hydronephrosis due to a 5 mm stone proximal   left ureter. Multiple nonobstructing right renal stones   STOMACH: Unremarkable. SMALL BOWEL: No dilatation or wall thickening. COLON: No dilatation or wall thickening. APPENDIX: Not enlarged   PERITONEUM: No ascites or pneumoperitoneum. RETROPERITONEUM: No lymphadenopathy or aortic aneurysm. REPRODUCTIVE ORGANS: Not enlarged   URINARY BLADDER: Compressed but obscured by artifact from the patient's hip   prostheses   BONES: No destructive bone lesion. ABDOMINAL WALL: No mass or hernia.    ADDITIONAL COMMENTS: N/A                 Results       Procedure Component Value Units Date/Time    CULTURE, BLOOD, PAIRED [[id number]] Collected: 04/24/23 0357    Order Status: Completed Specimen: Blood Updated: 04/24/23 0542     Special Requests: NO SPECIAL REQUESTS        Culture result: NO GROWTH <24 HRS       CULTURE, URINE [[id number]]     Order Status: Canceled Specimen: Urine from Clean catch     CULTURE, BLOOD, PAIRED [[id number]]  (Abnormal) Collected: 04/22/23 1532    Order Status: Completed Specimen: Blood Updated: 04/23/23 0824     Special Requests: NO SPECIAL REQUESTS        Culture result:       POSSIBLE Escherichia coli GROWING IN BOTH BOTTLES DRAWN SITES=LAC AND L WRIST            (NOTE) GRAM NEGATIVE RODS GROWING IN BOTH BOTTLES WAS CALLED TO AND READ BACK BY Jose Angel Zaidi RN AT [address] [place] [address] ON 04/23/2023 BY PIERO    BLOOD CULTURE ID PANEL [[id number]]  (Abnormal) Collected: 04/22/23 1532    Order Status: Completed Specimen: Blood Updated: 04/23/23 0824     Acc. no. from Micro Order [id number]     Enterococcus faecalis Not detected        Enterococcus faecium Not detected        Listeria monocytogenes Not detected        Staphylococcus Not detected        Staphylococcus aureus Not detected        Staph epidermidis Not detected        Staph lugdunensis Not detected        Streptococcus Not detected        Streptococcus agalactiae (Group B) Not detected        Streptococcus pneumoniae Not detected        Streptococcus pyogenes (Group A) Not detected        Acinetobacter calcoaceticus-baumanii complex Not detected        Bacteroides fragilis Not detected        Enterobacterales species Detected        Enterobacter cloacae complex Not detected        Escherichia coli Detected        Klebsiella aerogenes Not detected        Klebsiella oxytoca Not detected        Klebsiella pneumoniae group Not detected        Proteus Not detected        Salmonella Not detected        Serratia marcescens Not detected        Haemophilus influenzae Not detected        Neisseria meningitidis Not detected        Pseudomonas aeruginosa Not detected        Steno maltophilia Not detected        Candida albicans Not detected        Candida auris Not detected        Candida glabrata Not detected        Candida krusei Not detected        Candida parapsilosis Not detected        Candida tropicalis Not detected        Crypto neoformans/gattii Not detected        RESISTANT GENES:            CTX-M (ESBL resistant gene) Not detected        IMP (Carbapenemase resistant gene) Not detected        KPC (Carbapenem Resistance Gene) Not detected        mcr-1 (Colistin resistant gene) Not detected        NDM (Carbapenemase resistant gene) Not detected        OXA-48-like (Carbapenemase resistant gene) Not detected        VIM (Carbapenemase resistant gene) Not detected        Comment       False positive results may rarely occur.  Correlate with clinical,epidemiologic, and other laboratory findings           Comment: Please see BCID Interpretation Guide in EP[employer] Links       URINE CULTURE HOLD SAMPLE [[id number]] Collected: 04/22/23 1325    Order Status: Completed Specimen: Urine Updated: 04/22/23 1328     Urine culture hold       Urine on hold in Microbiology dept for 2 days. If unpreserved urine is submitted, it cannot be used for addtional testing after 24 hours, recollection will be required. CULTURE, URINE [[id number]]  (Abnormal)  (Susceptibility) Collected: 04/22/23 1325    Order Status: Completed Specimen: Urine from Clean catch Updated: 04/24/23 0714     Special Requests: NO SPECIAL REQUESTS        [place] Count --        >100,000  COLONIES/mL       Culture result: Escherichia coli       Susceptibility        Escherichia coli      NIGEL      Amikacin ($) Susceptible      Ampicillin ($) Susceptible      Ampicillin/sulbactam ($) Susceptible      Cefazolin ($) Susceptible      Cefepime ($$) Susceptible      Cefoxitin Susceptible      Ceftazidime ($) Susceptible      Ceftriaxone ($) Susceptible      Ciprofloxacin ($) Susceptible      Gentamicin ($) Susceptible      Levofloxacin ($) Susceptible      Meropenem ($$) Susceptible      Nitrofurantoin Susceptible      Piperacillin/Tazobac ($) Susceptible      Tobramycin ($) Susceptible      Trimeth/Sulfa Susceptible                                       Labs:     Recent Labs     04/23/23  0612 04/22/23  1309   WBC 9.2 12.0*   HGB 10.8* 11.0*   HCT 36.3 36.5    226       Recent Labs     04/23/23  0612 04/22/23  1309   * 135*   K 4.0 4.1    102   CO2 24 28   BUN 59* 49*   CREA 3.71* 3.06*   GLU 94 103*   CA 8.5 8.6   MG 1.9  --    PHOS 2.8  --        Recent Labs     04/23/23  0612 04/22/23  1309   ALT 32 39    111   TBILI 0.8 0.7   TP 7.1 7.3   ALB 2.5* 2.6*   GLOB 4.6* 4.7*       No results for input(s): INR, PTP, APTT, INREXT, INREXT in the last 72 hours. No results for input(s): FE, TIBC, PSAT, FERR in the last 72 hours.    No results found for: FOL, RBCF   No results for input(s): PH, PCO2, PO2 in the last 72 hours. No results for input(s): CPK, CKNDX, TROIQ in the last 72 hours.     No lab exists for component: CPKMB  Lab Results   Component Value Date/Time    Cholesterol, total 219 (H) 08/24/2022 09:00 AM    HDL Cholesterol 40 08/24/2022 09:00 AM    LDL, calculated 142 (H) 08/24/2022 09:00 AM    LDL, calculated 81 02/20/2019 03:23 PM    Triglyceride 203 (H) 08/24/2022 09:00 AM     Lab Results   Component Value Date/Time    Glucose (POC) 88 12/10/2019 10:57 AM     Lab Results   Component Value Date/Time    Color DARK YELLOW 04/22/2023 01:25 PM    Appearance TURBID (A) 04/22/2023 01:25 PM    Specific gravity 1.022 04/22/2023 01:25 PM    pH (UA) 5.0 04/22/2023 01:25 PM    Protein 300 (A) 04/22/2023 01:25 PM    Glucose Negative 04/22/2023 01:25 PM    Ketone TRACE (A) 04/22/2023 01:25 PM    Bilirubin Negative 06/09/2020 11:48 AM    Urobilinogen 1.0 04/22/2023 01:25 PM    Nitrites Negative 04/22/2023 01:25 PM    Leukocyte Esterase LARGE (A) 04/22/2023 01:25 PM    Epithelial cells FEW 04/22/2023 01:25 PM    Bacteria 2+ (A) 04/22/2023 01:25 PM    WBC >100 (H) 04/22/2023 01:25 PM    RBC 5-10 04/22/2023 01:25 PM         Medications Reviewed:     Current Facility-Administered Medications   Medication Dose Route Frequency    cefTRIAXone (ROCEPHIN) 2 g in 0.9% sodium chloride 20 mL IV syringe  2 g IntraVENous Q24H    calcium carbonate (OS-LIAM) tablet 1,000 mg [elemental]  1,000 mg Oral QHS    dilTIAZem ER (CARDIZEM CD) capsule 240 mg  240 mg Oral DAILY    DULoxetine (CYMBALTA) capsule 60 mg  60 mg Oral 7am    levothyroxine (SYNTHROID) tablet 125 mcg  125 mcg Oral QHS    pantoprazole (PROTONIX) tablet 40 mg  40 mg Oral DAILY    sodium chloride (NS) flush 5-40 mL  5-40 mL IntraVENous Q8H    sodium chloride (NS) flush 5-40 mL  5-40 mL IntraVENous PRN    acetaminophen (TYLENOL) tablet 650 mg  650 mg Oral Q6H PRN    Or    acetaminophen (TYLENOL) suppository 650 mg  650 mg Rectal Q6H PRN polyethylene glycol (MIRALAX) packet 17 g  17 g Oral DAILY PRN    ondansetron (ZOFRAN ODT) tablet 4 mg  4 mg Oral Q8H PRN    Or    ondansetron (ZOFRAN) injection 4 mg  4 mg IntraVENous Q6H PRN    oxyCODONE-acetaminophen (PERCOCET) 5-325 mg per tablet 1 Tablet  1 Tablet Oral Q4H PRN    HYDROmorphone (DILAUDID) injection 1 mg  1 mg IntraVENous Q4H PRN    lactated Ringers infusion  125 mL/hr IntraVENous CONTINUOUS     ______________________________________________________________________  EXPECTED LENGTH OF STAY: - - -  ACTUAL LENGTH OF STAY:          2                 Rachel Ngo MD

## 2023-04-24 NOTE — PROGRESS NOTES
Progress Note    Patient: Loraine Eddy MRN: [id number]  SSN: [id number]    YOB: 1960  Age: 57 y.o. Sex: female          ADMITTED:  2023 to Adrian Thibodeaux MD  for Sepsis secondary to UTI [facility]) [A41.9, N39.0]           Loraine Eddy is 2 Days Post-Op Procedure(s):  CYSTOSCOPY LEFT URETERAL STENT INSERTION PLACEMENT OF DHILLON CATHETER. She is doing well. Feeling better overall. She has no pain. She denies chills, N/V. Dhillon draining clear yellow UA. Complains of constipation. Last BM  5 days ago, passing flatus. Afebrile, VSS  WBC normalized  Cr down trending  Ucx, BCX + e coli   + Rocephin       Vitals:  Temp (24hrs), Av.4 °F (36.9 °C), Min:97.2 °F (36.2 °C), Max:99.3 °F (37.4 °C)     Blood pressure 111/72, pulse 90, temperature 98.9 °F (37.2 °C), resp. rate 16, height 5' 8\" (1.727 m), weight 120.5 kg (265 lb 10.5 oz), SpO2 97 %. I&O's:   1901 -  0700  In: 1250 [I.V.:1250]  Out: 1155 [Urine:1150]   No intake/output data recorded. Exam:   Alert, NAD. abdomen soft, mild LLQ tenderness to palpation, no CVA tenderness. Dhillon with clear urine output. Labs:   Recent Labs     23  0806 23  0612 23  1309   WBC 10.2 9.2 12.0*   HGB 10.0* 10.8* 11.0*   HCT 33.4* 36.3 36.5    173 226     Recent Labs     23  0806 23  0612 23  1309    135* 135*   K 3.9 4.0 4.1    106 102   CO2 25 24 28   GLU 90 94 103*   BUN 57* 59* 49*   CREA 2.86* 3.71* 3.06*   CA 9.1 8.5 8.6        Cultures:        Imaging:       Assessment:     - 2 Days Post-Op Procedure(s):  CYSTOSCOPY LEFT URETERAL STENT INSERTION PLACEMENT OF DHILLON CATHETER    Principal Problem:    Acute pyelonephritis (2023)    S/P stent placement for obstructing stone and urosepsis. Ucx pan sensitive e coli. Blood cultures growing probable e coli. Repeat cultures pending. Clinically improving.      Plan: Awaiting final C/S and repeat BCx. Will need 10 - 14 of culture directed abx. Okay to ada avery. F/U with Dr. Tere Smith arranged to plan stone treatment after infection treated. F/U details in the chart. Pericolace added for constipation    Please call with questions.      Supervising MD, Dr. Lauren Pandya   Signed By: Arabella Hinton NP - April 24, 2023

## 2023-04-24 NOTE — PROGRESS NOTES
Bedside and Verbal shift change report given to Roxane (oncoming nurse) by Scarlett Soler RN   (offgoing nurse). Report included the following information SBAR, Kardex, Intake/Output, MAR, and Recent Results.

## 2023-04-25 LAB
ANION GAP SERPL CALC-SCNC: 4 MMOL/L (ref 5–15)
BACTERIA SPEC CULT: ABNORMAL
BACTERIA SPEC CULT: ABNORMAL
BUN SERPL-MCNC: 50 MG/DL (ref 6–20)
BUN/CREAT SERPL: 22 (ref 12–20)
CALCIUM SERPL-MCNC: 8.9 MG/DL (ref 8.5–10.1)
CHLORIDE SERPL-SCNC: 104 MMOL/L (ref 97–108)
CO2 SERPL-SCNC: 25 MMOL/L (ref 21–32)
CREAT SERPL-MCNC: 2.26 MG/DL (ref 0.55–1.02)
ERYTHROCYTE [DISTWIDTH] IN BLOOD BY AUTOMATED COUNT: 17 % (ref 11.5–14.5)
GLUCOSE SERPL-MCNC: 111 MG/DL (ref 65–100)
HCT VFR BLD AUTO: 38.2 % (ref 35–47)
HGB BLD-MCNC: 11.5 G/DL (ref 11.5–16)
MCH RBC QN AUTO: 26.7 PG (ref 26–34)
MCHC RBC AUTO-ENTMCNC: 30.1 G/DL (ref 30–36.5)
MCV RBC AUTO: 88.8 FL (ref 80–99)
NRBC # BLD: 0 K/UL (ref 0–0.01)
NRBC BLD-RTO: 0 PER 100 WBC
PLATELET # BLD AUTO: 217 K/UL (ref 150–400)
PMV BLD AUTO: 9.3 FL (ref 8.9–12.9)
POTASSIUM SERPL-SCNC: 4 MMOL/L (ref 3.5–5.1)
RBC # BLD AUTO: 4.3 M/UL (ref 3.8–5.2)
SERVICE CMNT-IMP: ABNORMAL
SODIUM SERPL-SCNC: 133 MMOL/L (ref 136–145)
WBC # BLD AUTO: 9.1 K/UL (ref 3.6–11)

## 2023-04-25 PROCEDURE — 36415 COLL VENOUS BLD VENIPUNCTURE: CPT

## 2023-04-25 PROCEDURE — 74011000250 HC RX REV CODE- 250: Performed by: FAMILY MEDICINE

## 2023-04-25 PROCEDURE — 80048 BASIC METABOLIC PNL TOTAL CA: CPT

## 2023-04-25 PROCEDURE — 74011000250 HC RX REV CODE- 250: Performed by: INTERNAL MEDICINE

## 2023-04-25 PROCEDURE — 74011250637 HC RX REV CODE- 250/637: Performed by: NURSE PRACTITIONER

## 2023-04-25 PROCEDURE — 74011250636 HC RX REV CODE- 250/636: Performed by: FAMILY MEDICINE

## 2023-04-25 PROCEDURE — 85027 COMPLETE CBC AUTOMATED: CPT

## 2023-04-25 PROCEDURE — 65270000046 HC RM TELEMETRY

## 2023-04-25 PROCEDURE — 74011250637 HC RX REV CODE- 250/637: Performed by: INTERNAL MEDICINE

## 2023-04-25 RX ADMIN — Medication 1000 MG: at 21:52

## 2023-04-25 RX ADMIN — LEVOTHYROXINE SODIUM 125 MCG: 0.12 TABLET ORAL at 21:52

## 2023-04-25 RX ADMIN — SODIUM CHLORIDE, PRESERVATIVE FREE 10 ML: 5 INJECTION INTRAVENOUS at 21:53

## 2023-04-25 RX ADMIN — SENNOSIDES AND DOCUSATE SODIUM 1 TABLET: 8.6; 5 TABLET ORAL at 08:18

## 2023-04-25 RX ADMIN — PANTOPRAZOLE SODIUM 40 MG: 40 TABLET, DELAYED RELEASE ORAL at 08:18

## 2023-04-25 RX ADMIN — DILTIAZEM HYDROCHLORIDE 240 MG: 240 CAPSULE, EXTENDED RELEASE ORAL at 08:18

## 2023-04-25 RX ADMIN — SODIUM CHLORIDE 2 G: 9 INJECTION INTRAMUSCULAR; INTRAVENOUS; SUBCUTANEOUS at 18:40

## 2023-04-25 RX ADMIN — SODIUM CHLORIDE, POTASSIUM CHLORIDE, SODIUM LACTATE AND CALCIUM CHLORIDE 100 ML/HR: 600; 310; 30; 20 INJECTION, SOLUTION INTRAVENOUS at 20:42

## 2023-04-25 RX ADMIN — SODIUM CHLORIDE, PRESERVATIVE FREE 10 ML: 5 INJECTION INTRAVENOUS at 07:15

## 2023-04-25 RX ADMIN — DULOXETINE HYDROCHLORIDE 60 MG: 60 CAPSULE, DELAYED RELEASE ORAL at 07:15

## 2023-04-25 NOTE — PROGRESS NOTES
Bedside and Verbal shift change report given to [place] [id number] (oncoming nurse) by Juli Ding RN (offgoing nurse). Report included the following information SBAR, Kardex, and MAR.

## 2023-04-25 NOTE — PROGRESS NOTES
[facility]  Hospitalist Group                                                                                          Hospitalist Progress Note  Dotty Carter MD  Answering service: [phone] OR [phone] from in house phone        Date of Service:  2023  NAME:  Gretchen Peterson  :  1960  MRN:  [id number]      Admission Summary: This is a 68-year-old woman with past medical history significant for hypertension, hypothyroidism, rheumatoid arthritis, degenerative disk disease, supraventricular tachycardia, and kidney stone; presented at the emergency room with abdominal pain. This started about 2 days ago. The pain is located at the left flank with radiation to the groin, constant dull ache, 8/10 in severity, no known aggravating or relieving factors. The patient has had kidney stone in the past that required intervention, and the patient stated that this presentation is similar to when she was diagnosed with kidney stone. When the patient arrived at the emergency room, CT scan of the abdomen and pelvis without contrast was obtained. This shows moderate left-sided hydronephrosis due to 5-mm left ureteral stone. The emergency room physician consulted Urology Service on-call and the patient was taken straight to the operating room from the emergency room for cystoscopy and left ureteral stent insertion. The patient was then referred to the hospitalist service for admission. The patient was last admitted to the hospital from 2020 to 2020. The patient was admitted to the Orthopedic Service and underwent elective left total hip arthroplasty secondary to osteoarthritis of the left hip.        Interval history / Subjective:   Patient seen and examined, reviewed her vitals, labs, careplan, micro results  Showed the patient her CT scan prior to stent placement  Repeat blood cultures sent Monday morning- repeat creatinine showing improvement     Assessment & Plan: 1.  E Coli UTI/ bacteremia from acute pyelonephritis/ left ureteral obstruction and stent placement- - pansensitive but patient allergic to floroquinolones- repeat cultures sent Monday morning-she will likely need to complete 2 weeks of IV antibotics and will need to hold PICC line placement until cultures are 72hrs old which is this Thursday- consult ID for outpatient IV antibiotic recs. 2.  Left ureteral stone with Moderate left-sided hydronephrosis- stone obstructing left ureter- urology consulted, left ureteral stent placed- further tx to be planned outpatient after treatment of infection. 3.  Hyponatremia- correcting w IVFLuids. 4. Psych- depression- stable on home meds  5. Anemia- due to sepis from infection- monitor hemoglobin  6. Hypertension- hypotensive from sepsis/shock - monitor BP- improved today- restarted diltiazem but continue to hold losartan  7. Hypothyroidism- cont levothyroxine. 8.  Rheumatoid arthritis- restarted home meds. 9.  Acute kidney injury. - monitor with IVFluids; creatinine down from 3.7 down to 2.2     Code status: FULL  Prophylaxis: 4488 Constantino Weaver discussed with: patient  Anticipated Disposition: Andrez 1822 Problems  Date Reviewed: 4/22/2023            Codes Class Noted POA    * (Principal) Acute pyelonephritis ICD-10-CM: N10  ICD-9-CM: 590.10  4/22/2023 Yes         Review of Systems:   A comprehensive review of systems was negative except for that written in the HPI. Vital Signs:    Last 24hrs VS reviewed since prior progress note.  Most recent are:  Visit Vitals  /69 (BP 1 Location: Left upper arm, BP Patient Position: Lying)   Pulse 88   Temp 98.4 °F (36.9 °C)   Resp 20   Ht 5' 8\" (1.727 m)   Wt 120.5 kg (265 lb 10.5 oz)   SpO2 90%   BMI 40.39 kg/m²     Patient Vitals for the past 24 hrs:   Temp Pulse Resp BP SpO2   04/25/23 1416 -- 88 -- -- --   04/25/23 1209 -- 85 -- -- --   04/25/23 1148 98.4 °F (36.9 °C) 89 20 108/69 90 %   04/25/23 1021 -- 95 -- -- --   04/25/23 0746 99.3 °F (37.4 °C) 84 16 (!) 109/55 96 %   04/25/23 0548 -- 82 -- -- --   04/25/23 0436 98.2 °F (36.8 °C) 83 16 129/80 99 %   04/25/23 0348 -- 76 -- -- --   04/25/23 0149 -- 79 -- -- --   04/24/23 2325 98.5 °F (36.9 °C) 95 16 126/75 95 %   04/24/23 2148 -- 89 -- -- --   04/24/23 2100 99.9 °F (37.7 °C) 96 -- 115/68 99 %   04/24/23 2004 -- 96 -- -- --   04/24/23 1919 98.9 °F (37.2 °C) (!) 102 16 108/71 96 %   04/24/23 1800 -- (!) 103 -- -- --   04/24/23 1505 99 °F (37.2 °C) 97 16 139/77 97 %          Intake/Output Summary (Last 24 hours) at 4/25/2023 1425  Last data filed at 4/25/2023 1330  Gross per 24 hour   Intake 2501 ml   Output --   Net 2501 ml          Physical Examination:     I had a face to face encounter with this patient and independently examined them on 4/25/2023 as outlined below:          Constitutional:  No acute distress, cooperative, pleasant    ENT:  Oral mucosa moist, oropharynx benign. Resp:  CTA bilaterally. No wheezing/rhonchi/rales. No accessory muscle use. CV:  Regular rhythm, normal rate, no murmurs, gallops, rubs    GI:  Soft, non distended, non tender. normoactive bowel sounds,     Musculoskeletal:  No edema, warm, 2+ pulses throughout    Neurologic:  Moves all extremities. AAOx3, CN II-XII reviewed            Data Review:    Review and/or order of clinical lab test  Review and/or order of tests in the radiology section of [facility]  Review and/or order of tests in the medicine section of [facility]  CT Results  (Last 48 hours)                 04/22/23 1656  CT ABD PELV WO CONT Final result    Impression:      1. Moderate left-sided hydronephrosis due to a 5 mm stone proximal left ureter       Narrative:  EXAM: CT ABD PELV WO CONT       INDICATION: kidney stones/ left flank pain       COMPARISON: None       IV CONTRAST: None. ORAL CONTRAST: None       TECHNIQUE:    Thin axial images were obtained through the abdomen and pelvis.  Coronal and   sagittal reformats were generated. CT dose reduction was achieved through use of   a standardized protocol tailored for this examination and automatic exposure   control for dose modulation. The absence of intravenous contrast material reduces the sensitivity for   evaluation of the vasculature and solid organs. FINDINGS:    LOWER THORAX: Patchy basilar atelectasis   LIVER: No mass. BILIARY TREE: Surgically absent CBD is not dilated. SPLEEN: within normal limits. PANCREAS: No focal abnormality. ADRENALS: Unremarkable. KIDNEYS/URETERS: Moderate left-sided hydronephrosis due to a 5 mm stone proximal   left ureter. Multiple nonobstructing right renal stones   STOMACH: Unremarkable. SMALL BOWEL: No dilatation or wall thickening. COLON: No dilatation or wall thickening. APPENDIX: Not enlarged   PERITONEUM: No ascites or pneumoperitoneum. RETROPERITONEUM: No lymphadenopathy or aortic aneurysm. REPRODUCTIVE ORGANS: Not enlarged   URINARY BLADDER: Compressed but obscured by artifact from the patient's hip   prostheses   BONES: No destructive bone lesion. ABDOMINAL WALL: No mass or hernia.    ADDITIONAL COMMENTS: N/A                 Results       Procedure Component Value Units Date/Time    CULTURE, BLOOD, PAIRED [[id number]] Collected: 04/24/23 0357    Order Status: Completed Specimen: Blood Updated: 04/25/23 0542     Special Requests: NO SPECIAL REQUESTS        Culture result: NO GROWTH 1 DAY       CULTURE, URINE [[id number]]     Order Status: Canceled Specimen: Urine from Clean catch     CULTURE, BLOOD, PAIRED [[id number]]  (Abnormal)  (Susceptibility) Collected: 04/22/23 1532    Order Status: Completed Specimen: Blood Updated: 04/25/23 1032     Special Requests: NO SPECIAL REQUESTS        Culture result:       Escherichia coli GROWING IN BOTH BOTTLES DRAWN SITES=LAC AND L WRIST            (NOTE) GRAM NEGATIVE RODS GROWING IN BOTH BOTTLES WAS CALLED TO AND READ BACK BY Jose Angel Zaidi RN AT 0617 ON 04/23/2023 BY PIERO Susceptibility        Escherichia coli      NIGEL      Amikacin ($) Susceptible      Ampicillin ($) Susceptible      Ampicillin/sulbactam ($) Susceptible      Cefazolin ($) Susceptible      Cefepime ($$) Susceptible      Cefoxitin Susceptible      Ceftazidime ($) Susceptible      Ceftriaxone ($) Susceptible      Ciprofloxacin ($) Susceptible      Gentamicin ($) Susceptible      Levofloxacin ($) Susceptible      Meropenem ($$) Susceptible      Piperacillin/Tazobac ($) Susceptible      Tobramycin ($) Susceptible      Trimeth/Sulfa Susceptible                           BLOOD CULTURE ID PANEL [[id number]]  (Abnormal) Collected: 04/22/23 1532    Order Status: Completed Specimen: Blood Updated: 04/23/23 0824     Acc. no. from Micro Order [id number]     Enterococcus faecalis Not detected        Enterococcus faecium Not detected        Listeria monocytogenes Not detected        Staphylococcus Not detected        Staphylococcus aureus Not detected        Staph epidermidis Not detected        Staph lugdunensis Not detected        Streptococcus Not detected        Streptococcus agalactiae (Group B) Not detected        Streptococcus pneumoniae Not detected        Streptococcus pyogenes (Group A) Not detected        Acinetobacter calcoaceticus-baumanii complex Not detected        Bacteroides fragilis Not detected        Enterobacterales species Detected        Enterobacter cloacae complex Not detected        Escherichia coli Detected        Klebsiella aerogenes Not detected        Klebsiella oxytoca Not detected        Klebsiella pneumoniae group Not detected        Proteus Not detected        Salmonella Not detected        Serratia marcescens Not detected        Haemophilus influenzae Not detected        Neisseria meningitidis Not detected        Pseudomonas aeruginosa Not detected        Steno maltophilia Not detected        Candida albicans Not detected        Candida auris Not detected        Candida glabrata Not detected Felisa krusei Not detected        Candida parapsilosis Not detected        Candida tropicalis Not detected        Crypto neoformans/gattii Not detected        RESISTANT GENES:            CTX-M (ESBL resistant gene) Not detected        IMP (Carbapenemase resistant gene) Not detected        KPC (Carbapenem Resistance Gene) Not detected        mcr-1 (Colistin resistant gene) Not detected        NDM (Carbapenemase resistant gene) Not detected        OXA-48-like (Carbapenemase resistant gene) Not detected        VIM (Carbapenemase resistant gene) Not detected        Comment       False positive results may rarely occur. Correlate with clinical,epidemiologic, and other laboratory findings           Comment: Please see BCID Interpretation Guide in [employer] Links       URINE CULTURE HOLD SAMPLE [[id number]] Collected: 04/22/23 1325    Order Status: Completed Specimen: Urine Updated: 04/22/23 1328     Urine culture hold       Urine on hold in Microbiology dept for 2 days. If unpreserved urine is submitted, it cannot be used for addtional testing after 24 hours, recollection will be required.           CULTURE, URINE [[id number]]  (Abnormal)  (Susceptibility) Collected: 04/22/23 1325    Order Status: Completed Specimen: Urine from Clean catch Updated: 04/24/23 0714     Special Requests: NO SPECIAL REQUESTS        [place] Count --        >100,000  COLONIES/mL       Culture result: Escherichia coli       Susceptibility        Escherichia coli      NIGEL      Amikacin ($) Susceptible      Ampicillin ($) Susceptible      Ampicillin/sulbactam ($) Susceptible      Cefazolin ($) Susceptible      Cefepime ($$) Susceptible      Cefoxitin Susceptible      Ceftazidime ($) Susceptible      Ceftriaxone ($) Susceptible      Ciprofloxacin ($) Susceptible      Gentamicin ($) Susceptible      Levofloxacin ($) Susceptible      Meropenem ($$) Susceptible      Nitrofurantoin Susceptible      Piperacillin/Tazobac ($) Susceptible      Tobramycin ($) Susceptible      Trimeth/Sulfa Susceptible                                       Labs:     Recent Labs     04/25/23  0722 04/24/23  0806   WBC 9.1 10.2   HGB 11.5 10.0*   HCT 38.2 33.4*    210       Recent Labs     04/25/23  0722 04/24/23  0806 04/23/23  0612   * 136 135*   K 4.0 3.9 4.0    105 106   CO2 25 25 24   BUN 50* 57* 59*   CREA 2.26* 2.86* 3.71*   * 90 94   CA 8.9 9.1 8.5   MG  --   --  1.9   PHOS  --   --  2.8       Recent Labs     04/23/23 0612   ALT 32      TBILI 0.8   TP 7.1   ALB 2.5*   GLOB 4.6*       No results for input(s): INR, PTP, APTT, INREXT, INREXT in the last 72 hours. No results for input(s): FE, TIBC, PSAT, FERR in the last 72 hours. No results found for: FOL, RBCF   No results for input(s): PH, PCO2, PO2 in the last 72 hours. No results for input(s): CPK, CKNDX, TROIQ in the last 72 hours.     No lab exists for component: CPKMB  Lab Results   Component Value Date/Time    Cholesterol, total 219 (H) 08/24/2022 09:00 AM    HDL Cholesterol 40 08/24/2022 09:00 AM    LDL, calculated 142 (H) 08/24/2022 09:00 AM    LDL, calculated 81 02/20/2019 03:23 PM    Triglyceride 203 (H) 08/24/2022 09:00 AM     Lab Results   Component Value Date/Time    Glucose (POC) 88 12/10/2019 10:57 AM     Lab Results   Component Value Date/Time    Color DARK YELLOW 04/22/2023 01:25 PM    Appearance TURBID (A) 04/22/2023 01:25 PM    Specific gravity 1.022 04/22/2023 01:25 PM    pH (UA) 5.0 04/22/2023 01:25 PM    Protein 300 (A) 04/22/2023 01:25 PM    Glucose Negative 04/22/2023 01:25 PM    Ketone TRACE (A) 04/22/2023 01:25 PM    Bilirubin Negative 06/09/2020 11:48 AM    Urobilinogen 1.0 04/22/2023 01:25 PM    Nitrites Negative 04/22/2023 01:25 PM    Leukocyte Esterase LARGE (A) 04/22/2023 01:25 PM    Epithelial cells FEW 04/22/2023 01:25 PM    Bacteria 2+ (A) 04/22/2023 01:25 PM    WBC >100 (H) 04/22/2023 01:25 PM    RBC 5-10 04/22/2023 01:25 PM         Medications Reviewed: Current Facility-Administered Medications   Medication Dose Route Frequency    senna-docusate (PERICOLACE) 8.6-50 mg per tablet 1 Tablet  1 Tablet Oral DAILY    cefTRIAXone (ROCEPHIN) 2 g in 0.9% sodium chloride 20 mL IV syringe  2 g IntraVENous Q24H    calcium carbonate (OS-LIAM) tablet 1,000 mg [elemental]  1,000 mg Oral QHS    dilTIAZem ER (CARDIZEM CD) capsule 240 mg  240 mg Oral DAILY    DULoxetine (CYMBALTA) capsule 60 mg  60 mg Oral 7am    levothyroxine (SYNTHROID) tablet 125 mcg  125 mcg Oral QHS    pantoprazole (PROTONIX) tablet 40 mg  40 mg Oral DAILY    sodium chloride (NS) flush 5-40 mL  5-40 mL IntraVENous Q8H    sodium chloride (NS) flush 5-40 mL  5-40 mL IntraVENous PRN    acetaminophen (TYLENOL) tablet 650 mg  650 mg Oral Q6H PRN    Or    acetaminophen (TYLENOL) suppository 650 mg  650 mg Rectal Q6H PRN    polyethylene glycol (MIRALAX) packet 17 g  17 g Oral DAILY PRN    ondansetron (ZOFRAN ODT) tablet 4 mg  4 mg Oral Q8H PRN    Or    ondansetron (ZOFRAN) injection 4 mg  4 mg IntraVENous Q6H PRN    oxyCODONE-acetaminophen (PERCOCET) 5-325 mg per tablet 1 Tablet  1 Tablet Oral Q4H PRN    HYDROmorphone (DILAUDID) injection 1 mg  1 mg IntraVENous Q4H PRN    lactated Ringers infusion  100 mL/hr IntraVENous CONTINUOUS     ______________________________________________________________________  EXPECTED LENGTH OF STAY: 3d 0h  ACTUAL LENGTH OF STAY:          3                 Farnaz Rhodes MD

## 2023-04-25 NOTE — PROGRESS NOTES
Spiritual Care Partner Volunteer visited patient [facility] [address] [facility] [address] [place] on 4/25/2023   Documented by:  Sweetie Myles MDIV, [facility]

## 2023-04-25 NOTE — CONSULTS
Infectious Disease Consult Note    Reason for Consult: Bacteremia  Date of Consultation: 2023  Date of Admission: 2023  Referring Physician: Dr Evert Pacheco      HPI: The patient was admitted by way of the emergency room with severe abdominal pain. The patient has a history of kidney stones and she said this is what feels like when I have a kidney stone. Patient had CT of the abdomen and pelvis which revealed moderate left-sided hydronephrosis due to a 5 mm left ureteral stone. Urology consultation was obtained and the patient went straight to the operating room from the ED for cystoscopy and stent insertion. Subsequently the patient had urine and blood culture studies done which showed E. coli which was pansensitive with 100,000 CFU on urine and blood cultures 2/2 bottles positive. The patient is receiving ceftriaxone 2 g every 24 hours. Repeat blood cultures are pending  .       Past Medical History:  Past Medical History:   Diagnosis Date    Arthritis     RHEUMATOID    Atrial fibrillation ([address] 2018    Autoimmune disease ([facility]     RHEUMATOID ARTHRITIS    Chronic pain     HIP    Kidney stones     Melanoma ([address]     RT FOREARM    Migraine     Rheumatoid arthritis(714.0)     SVT (supraventricular tachycardia) ([facility] 2018    Thyroid disease     HYPOTHYROIDISM         Surgical History:  Past Surgical History:   Procedure Laterality Date    HX BACK SURGERY      DISCECTOMY    HX  SECTION  1984    [address]    HX HERNIA REPAIR  7157    UMBILICAL    HX LITHOTRIPSY      HX MALIGNANT SKIN LESION EXCISION      HX TONSILLECTOMY      AS A CHILD    HX UROLOGICAL Bilateral 2014    STENTS X 2    HX WISDOM TEETH EXTRACTION           Family History:   Family History   Problem Relation Age of Onset    Stroke Mother     Heart Disease Mother     Stroke Father     Heart Disease Father     Crohn's Disease Sister     No Known Problems Sister     Crohn's Disease Son No Known Problems Son     Anesth Problems Neg Hx          Social History:     Living Situation:  Tobacco: Past  Alcohol: no  Illicit Drugs:  Sexual History:   Travel History  Exposures:   Outdoor/Hiking: denied  Animal/Pet: Dogs/ Cats unknown  Construction: denied  Hot Tub: denied   Brackish/stagnant exposure: denied  TB exposure: denied  Sick Contacts: denied     Allergies: Allergies   Allergen Reactions    Bactrim [Sulfamethoprim] Hives    Ciprofloxacin Hives    Sulfamethoxazole-Trimethoprim Hives         Review of Systems:     Gen: Negative for current fever sweats or chills   HEENT: Negative for headache, vision changes, ear ache or discharge, tingling,  nasal discharge, swelling, lumps in neck, sores on tongue   CV:  Negative for chest pain, dyspnea on exertion, leg edema   Lungs: Negative for shortness of breath, cough, wheezing   Abdomen: Positive for abdominal pain, no nausea, vomiting, diarrhea, constipation   Genitourinary: Negative     Neuro: Negative for headache, numbness, tingling, extremity weakness,  syncope, seizures    Skin: Negative for rash, sores/open wounds   Musculoskeletal: Negative for joint pain, joint swelling, joint erythema    Endocrine: Negative for high or low blood sugars, heat or cold intolerance    Psych: Negative     Medications:  No current facility-administered medications on file prior to encounter. Current Outpatient Medications on File Prior to Encounter   Medication Sig Dispense Refill    dilTIAZem ER (CARDIZEM CD) 240 mg capsule TAKE ONE CAPSULE BY MOUTH ONE TIME DAILY 90 Capsule 3    etanercept (EnbreL) 50 mg/mL (1 mL) injection 1 mL. acetaminophen (TYLENOL) 500 mg tablet Take 1 Tab by mouth every four (4) hours. 100 Tab 0    calcium carbonate (OS-LIAM) 500 mg calcium (1,250 mg) tablet Take 2 Tablets by mouth nightly. pantoprazole (PROTONIX) 40 mg tablet Take 1 Tablet by mouth daily. celecoxib (CELEBREX) 100 mg capsule Take 1 Capsule by mouth daily. cephALEXin (KEFLEX) 500 mg capsule Take four capsules one hour prior to dental appointment (Patient not taking: Reported on 8/15/2022) 4 Capsule 2    losartan (COZAAR) 50 mg tablet Take  by mouth daily. FOLIC ACID PO Take 646 mg by mouth daily. aspirin delayed-release 81 mg tablet Take 1 Tab by mouth two (2) times a day. Indications: blood clot prevention 60 Tab 0    ascorbic acid, vitamin C, (Vitamin C) 250 mg chew Take 1 Tablet by mouth nightly. diclofenac EC (VOLTAREN) 75 mg EC tablet Take 1 Tablet by mouth two (2) times a day. DULoxetine (CYMBALTA) 60 mg capsule Take 1 Capsule by mouth every morning.      b-complex with vitamin c cap capsule Take 1 Capsule by mouth nightly. methotrexate (RHEUMATREX) 2.5 mg tablet Take 4 Tablets by mouth every Monday. levothyroxine (SYNTHROID) 125 mcg tablet Take 1 Tablet by mouth nightly. multivitamin (ONE A DAY) tablet Take 2 Tabs by mouth nightly.              Physical Exam:    Vitals: Patient Vitals for the past 24 hrs:   Temp Pulse Resp BP SpO2   04/25/23 1416 -- 88 -- -- --   04/25/23 1209 -- 85 -- -- --   04/25/23 1148 98.4 °F (36.9 °C) 89 20 108/69 90 %   04/25/23 1021 -- 95 -- -- --   04/25/23 0746 99.3 °F (37.4 °C) 84 16 (!) 109/55 96 %   04/25/23 0548 -- 82 -- -- --   04/25/23 0436 98.2 °F (36.8 °C) 83 16 129/80 99 %   04/25/23 0348 -- 76 -- -- --   04/25/23 0149 -- 79 -- -- --   04/24/23 2325 98.5 °F (36.9 °C) 95 16 126/75 95 %   04/24/23 2148 -- 89 -- -- --   04/24/23 2100 99.9 °F (37.7 °C) 96 -- 115/68 99 %   04/24/23 2004 -- 96 -- -- --   04/24/23 1919 98.9 °F (37.2 °C) (!) 102 16 108/71 96 %   04/24/23 1800 -- (!) 103 -- -- --     GEN: NAD patient states she is feeling much less pain than she did on admission although she still has some discomfort in the left flank area  HEENT: Normocephalic, atraumatic, PERRL, no scleral icterus,  no cervical, no lymphadenopathy, no sinus tenderness, no thrush  CV: S1, S2 heard regularly, no murmurs, thrills or rubs heard   Lungs: Clear to auscultation bilaterally  Abdomen: soft, non distended, left flank tender, no organomegaly appreciated, no CVA tenderness , central adiposity with BMI of 40.39  Genitourinary: Deferred  Extremities: no edema, good ROM  Neuro: Alert, oriented to time, place and situation, moves all extremities to commands, verbal   Skin: no rash  Psych: good affect, good eye contact, pleasant, cooperative, conversant, calm  Lines:       Labs:   Recent Results (from the past 24 hour(s))   CBC W/O DIFF    Collection Time: 04/25/23  7:22 AM   Result Value Ref Range    WBC 9.1 3.6 - 11.0 K/uL    RBC 4.30 3.80 - 5.20 M/uL    HGB 11.5 11.5 - 16.0 g/dL    HCT 38.2 35.0 - 47.0 %    MCV 88.8 80.0 - 99.0 FL    MCH 26.7 26.0 - 34.0 PG    MCHC 30.1 30.0 - 36.5 g/dL    RDW 17.0 (H) 11.5 - 14.5 %    PLATELET 126 913 - 482 K/uL    MPV 9.3 8.9 - 12.9 FL    NRBC 0.0 0  WBC    ABSOLUTE NRBC 0.00 0.00 - 4.51 K/uL   METABOLIC PANEL, BASIC    Collection Time: 04/25/23  7:22 AM   Result Value Ref Range    Sodium 133 (L) 136 - 145 mmol/L    Potassium 4.0 3.5 - 5.1 mmol/L    Chloride 104 97 - 108 mmol/L    CO2 25 21 - 32 mmol/L    Anion gap 4 (L) 5 - 15 mmol/L    Glucose 111 (H) 65 - 100 mg/dL    BUN 50 (H) 6 - 20 MG/DL    Creatinine 2.26 (H) 0.55 - 1.02 MG/DL    BUN/Creatinine ratio 22 (H) 12 - 20      eGFR 24 (L) >60 ml/min/1.73m2    Calcium 8.9 8.5 - 10.1 MG/DL       Microbiology Data:       Blood: E. Coli 2/2 bottles repeat cultures pending      Urine: E. coli greater than 100 K CFU     Synovial/Joint fluid:     Sputum/BAL/Pleural:     Peritoneal:    Pathology Results:    Imaging: CT abdomen pelvis 4/22/2023 moderate left-sided hydronephrosis with 5 mm stone proximal left ureter    Procedures: Urology with stent placement in setting of left ureter stone    Assessment / Plan:     Urosepsis with E. coli 2/2 bottles currently without leukocytosis and afebrile on ceftriaxone 2 g every 24 with repeat blood cultures pending  Pyelonephritis secondary to nephrolithiasis, left hydronephrosis, E. coli UTI   Status post stent placement left ureter by urology team with decreased pain  Patient currently afebrile with no leukocytosis  5. Flank pain improving with patient able to eat  6.    Continue ceftriaxone 2 g IV piggyback every 24 hours with duration of treatment to be determined based on results of repeat blood cultures        Problem List as of 4/25/2023 Date Reviewed: 4/22/2023            Codes Class Noted - Resolved    * (Principal) Acute pyelonephritis ICD-10-CM: N10  ICD-9-CM: 590.10  4/22/2023 - Present        Osteoarthritis of left hip ICD-10-CM: M16.12  ICD-9-CM: 715.95  6/16/2020 - Present        Primary localized osteoarthritis of right hip ICD-10-CM: M16.11  ICD-9-CM: 715.15  12/10/2019 - Present        Severe obesity ([facility] ICD-10-CM: E66.01  ICD-9-CM: 278.01  2/20/2019 - Present        Type 2 diabetes with nephropathy ([facility] ICD-10-CM: E11.21  ICD-9-CM: 250.40, 583.81  3/12/2018 - Present        SVT (supraventricular tachycardia) ([facility] ICD-10-CM: I47.1  ICD-9-CM: 427.89  11/7/2017 - Present        Class 2 obesity due to excess calories without serious comorbidity with body mass index (BMI) of 35.0 to 35.9 in adult ICD-10-CM: E66.09, Z68.35  ICD-9-CM: 278.00, V85.35  11/7/2017 - Present        DM (diabetes mellitus) ([facility] ICD-10-CM: E11.9  ICD-9-CM: 250.00  3/11/2014 - Present        Obesity ICD-10-CM: E66.9  ICD-9-CM: 278.00  3/11/2014 - Present        Migraine with aura and without status migrainosus, not intractable ICD-10-CM: G43.109  ICD-9-CM: 346.00  4/23/2013 - Present        Rheumatoid arthritis ([facility] ICD-10-CM: M06.9  ICD-9-CM: 714.0  4/23/2013 - Present        Hypothyroid ICD-10-CM: E03.9  ICD-9-CM: 244.9  4/23/2013 - Present        Lumbar post-laminectomy syndrome ICD-10-CM: M96.1  ICD-9-CM: 722.83  4/23/2013 - Present        Melanoma ([facility] ICD-10-CM: C43.9  ICD-9-CM: 172.9  4/23/2013 - Present        H/O umbilical hernia repair PPJ-[id number]: V78.882, Z87.19  ICD-9-CM: V15.29  4/23/2013 - Present        Depression ICD-10-CM: F32. A  ICD-9-CM: 529  4/23/2013 - Present                  Thank for the opportunity to participate in the care of this patient. Please contact with questions or concerns.

## 2023-04-25 NOTE — PROGRESS NOTES
Problem: Falls - Risk of  Goal: *Absence of Falls  Description: Document Boy Rocheandra Fall Risk and appropriate interventions in the flowsheet.   Outcome: Progressing Towards Goal  Note: Fall Risk Interventions:  Mobility Interventions: Patient to call before getting OOB         Medication Interventions: Evaluate medications/consider consulting pharmacy, Patient to call before getting OOB, Teach patient to arise slowly    Elimination Interventions: Call light in reach, Patient to call for help with toileting needs

## 2023-04-26 LAB
ANION GAP SERPL CALC-SCNC: 4 MMOL/L (ref 5–15)
BUN SERPL-MCNC: 43 MG/DL (ref 6–20)
BUN/CREAT SERPL: 26 (ref 12–20)
CALCIUM SERPL-MCNC: 8.4 MG/DL (ref 8.5–10.1)
CHLORIDE SERPL-SCNC: 107 MMOL/L (ref 97–108)
CO2 SERPL-SCNC: 25 MMOL/L (ref 21–32)
CREAT SERPL-MCNC: 1.67 MG/DL (ref 0.55–1.02)
ERYTHROCYTE [DISTWIDTH] IN BLOOD BY AUTOMATED COUNT: 16.7 % (ref 11.5–14.5)
GLUCOSE SERPL-MCNC: 99 MG/DL (ref 65–100)
HCT VFR BLD AUTO: 33.3 % (ref 35–47)
HGB BLD-MCNC: 10.3 G/DL (ref 11.5–16)
MCH RBC QN AUTO: 27 PG (ref 26–34)
MCHC RBC AUTO-ENTMCNC: 30.9 G/DL (ref 30–36.5)
MCV RBC AUTO: 87.4 FL (ref 80–99)
NRBC # BLD: 0 K/UL (ref 0–0.01)
NRBC BLD-RTO: 0 PER 100 WBC
PLATELET # BLD AUTO: 221 K/UL (ref 150–400)
PMV BLD AUTO: 9.2 FL (ref 8.9–12.9)
POTASSIUM SERPL-SCNC: 3.5 MMOL/L (ref 3.5–5.1)
RBC # BLD AUTO: 3.81 M/UL (ref 3.8–5.2)
SODIUM SERPL-SCNC: 136 MMOL/L (ref 136–145)
WBC # BLD AUTO: 8.6 K/UL (ref 3.6–11)

## 2023-04-26 PROCEDURE — 94762 N-INVAS EAR/PLS OXIMTRY CONT: CPT

## 2023-04-26 PROCEDURE — 36415 COLL VENOUS BLD VENIPUNCTURE: CPT

## 2023-04-26 PROCEDURE — 74011250636 HC RX REV CODE- 250/636: Performed by: FAMILY MEDICINE

## 2023-04-26 PROCEDURE — 85027 COMPLETE CBC AUTOMATED: CPT

## 2023-04-26 PROCEDURE — 74011250637 HC RX REV CODE- 250/637: Performed by: NURSE PRACTITIONER

## 2023-04-26 PROCEDURE — 74011000250 HC RX REV CODE- 250: Performed by: INTERNAL MEDICINE

## 2023-04-26 PROCEDURE — 80048 BASIC METABOLIC PNL TOTAL CA: CPT

## 2023-04-26 PROCEDURE — 65270000046 HC RM TELEMETRY

## 2023-04-26 PROCEDURE — 74011000250 HC RX REV CODE- 250: Performed by: FAMILY MEDICINE

## 2023-04-26 PROCEDURE — 74011250637 HC RX REV CODE- 250/637: Performed by: INTERNAL MEDICINE

## 2023-04-26 RX ADMIN — DILTIAZEM HYDROCHLORIDE 240 MG: 240 CAPSULE, EXTENDED RELEASE ORAL at 08:26

## 2023-04-26 RX ADMIN — Medication 1000 MG: at 22:11

## 2023-04-26 RX ADMIN — SENNOSIDES AND DOCUSATE SODIUM 1 TABLET: 8.6; 5 TABLET ORAL at 08:26

## 2023-04-26 RX ADMIN — SODIUM CHLORIDE, POTASSIUM CHLORIDE, SODIUM LACTATE AND CALCIUM CHLORIDE 100 ML/HR: 600; 310; 30; 20 INJECTION, SOLUTION INTRAVENOUS at 09:39

## 2023-04-26 RX ADMIN — DULOXETINE HYDROCHLORIDE 60 MG: 60 CAPSULE, DELAYED RELEASE ORAL at 07:21

## 2023-04-26 RX ADMIN — LEVOTHYROXINE SODIUM 125 MCG: 0.12 TABLET ORAL at 22:11

## 2023-04-26 RX ADMIN — SODIUM CHLORIDE, PRESERVATIVE FREE 10 ML: 5 INJECTION INTRAVENOUS at 22:12

## 2023-04-26 RX ADMIN — SODIUM CHLORIDE, PRESERVATIVE FREE 10 ML: 5 INJECTION INTRAVENOUS at 07:23

## 2023-04-26 RX ADMIN — PANTOPRAZOLE SODIUM 40 MG: 40 TABLET, DELAYED RELEASE ORAL at 08:26

## 2023-04-26 RX ADMIN — SODIUM CHLORIDE 2 G: 9 INJECTION INTRAMUSCULAR; INTRAVENOUS; SUBCUTANEOUS at 16:49

## 2023-04-26 NOTE — PROGRESS NOTES
Transition Of Care: The patient plans to discharge home with family to transport when stable for discharge. CM notified in rounds the patient may need long term IV abx for 2 weeks. CM sent referral to [facility] to check benefits. No HH referral sent at this time. Note: [facility] accepted referral. CM to confirm with [facility] if home health services are needed. Orders and PICC pending. Transport: Family, pt lives with son  RUR: 13%    Primary Decision Maker: Porfirio [facility] [phone]    CM following for update on discharge needs. CM met with patient, Pt prefers home health for IV infusion vs. Out patient IV infusion.     Tom Rossi, RN BSN  Care Manager

## 2023-04-26 NOTE — PROGRESS NOTES
Bedside and Verbal shift change report given to MONTY Sims  (oncoming nurse) by Lance Bedolla RN  (offgoing nurse). Report included the following information SBAR.

## 2023-04-26 NOTE — PROGRESS NOTES
Problem: General Medical Care Plan  Goal: *Vital signs within specified parameters  Outcome: Progressing Towards Goal  Goal: *Labs within defined limits  Outcome: Progressing Towards Goal  Goal: *Absence of infection signs and symptoms  Outcome: Progressing Towards Goal  Goal: *Optimal pain control at patient's stated goal  Outcome: Progressing Towards Goal  Goal: *Skin integrity maintained  Outcome: Progressing Towards Goal  Goal: *Fluid volume balance  Outcome: Progressing Towards Goal  Goal: *Optimize nutritional status  Outcome: Progressing Towards Goal  Goal: *Anxiety reduced or absent  Outcome: Progressing Towards Goal  Goal: *Progressive mobility and function (eg: ADL's)  Outcome: Progressing Towards Goal     Problem: Patient Education: Go to Patient Education Activity  Goal: Patient/Family Education  Outcome: Progressing Towards Goal     Problem: Infection - Risk of, Urinary Catheter-Associated Urinary Tract Infection  Goal: *Absence of infection signs and symptoms  Outcome: Progressing Towards Goal     Problem: Patient Education: Go to Patient Education Activity  Goal: Patient/Family Education  Outcome: Progressing Towards Goal     Problem: Falls - Risk of  Goal: *Absence of Falls  Description: Document Kenny Fall Risk and appropriate interventions in the flowsheet.   Outcome: Progressing Towards Goal  Note: Fall Risk Interventions:  Mobility Interventions: Patient to call before getting OOB         Medication Interventions: Evaluate medications/consider consulting pharmacy, Patient to call before getting OOB, Teach patient to arise slowly    Elimination Interventions: Call light in reach, Patient to call for help with toileting needs              Problem: Patient Education: Go to Patient Education Activity  Goal: Patient/Family Education  Outcome: Progressing Towards Goal

## 2023-04-26 NOTE — PROGRESS NOTES
Physician Progress Note      Anjel Marquez  CSN #:                  [id number]  :                       1960  ADMIT DATE:       2023 1:09 PM  [address] [place] DATE:  RESPONDING  PROVIDER #:        Waqas Sargent MD          QUERY TEXT:    Patient admitted with pyelonephritis. Noted documentation of sepsis  in ED without fluid resuscitation and bacteremia in progress notes. If possible, please document in progress notes and discharge summary if you are evaluating and /or treating any of the following: The medical record reflects the following:  Risk Factors: positive blood cultures  Clinical Indicators:   PN  ? Gram neg bacteremia from acute pyelonephritis/ left ureteral obstruction. She will need repeat cultures and on DC completion of 2 weeks of IV antibotics    BP: (!) 104/56 (!) 129/58 (!) 106/59 113/65  Pulse: (!) 116 (!) 110 (!) 107 (!) 103  Resp: 20 18 20 20  Temp: (!) 101 ? F (38.3 ? C) ? ? 98.2 ? F (36.8 ? C)     blood cultures  Culture result: ? POSSIBLE Escherichia coli GROWING IN BOTH BOTTLES DRAWN SITES=LAC AND L WRIST? Abnormal? P  Culture result:  (NOTE) GRAM NEGATIVE RODS GROWING IN BOTH BOTTLES    23 13:09  WBC: 12.0 (H)    23 15:32  Lactic acid: 2.2 (HH)      Treatment: 1 lt IV fluid bolus, rocephin IV    Thank you,  Nehal Archuleta RN, CCDS, [facility]  Certified Clinical Documentation   [phone]  you can also reach me by perfect serve. Options provided:  -- Sepsis confirmed POA  -- bacteremia confirmed, and sepsis ruled out  -- Other - I will add my own diagnosis  -- Disagree - Not applicable / Not valid  -- Disagree - Clinically unable to determine / Unknown  -- Refer to Clinical Documentation Reviewer    PROVIDER RESPONSE TEXT:    After study, sepsis confirmed POA. Query created by: Victor Manuel Paredes on 2023 8:23 AM      QUERY TEXT:    Patient admitted with pyelonephritis.  Noted documentation of shock without needing pressors . In order to support the diagnosis of  shock, please include additional clinical indicators in your documentation. Or please document if the diagnosis of shock has been ruled out after further study. The medical record reflects the following:  Risk Factors: low BP  Clinical Indicators:    4/24 PN  ? Hypertension- hypotensive from sepsis/shock - monitor BP- improved today- restarted diltiazem but continue to hold losartan    Treatment: 1 lt fluid bolus, hold losartan      Thank you,  Zenobia Arambula RN, CCDS, [facility]  Certified Clinical Documentation   [phone]  you can also reach me by perfect serve. Options provided:  -- septic shock  present as evidenced by, Please document evidence.   -- hypotension with shock ruled out  -- Other - I will add my own diagnosis  -- Disagree - Not applicable / Not valid  -- Disagree - Clinically unable to determine / Unknown  -- Refer to Clinical Documentation Reviewer    PROVIDER RESPONSE TEXT:    septic shock is present as evidenced by low BP and elevated creatiine    Query created by: Sabrina Whitfield on 4/25/2023 8:28 AM      Electronically signed by:  Helen Nicole MD 4/26/2023 6:55 AM

## 2023-04-27 VITALS
WEIGHT: 265.65 LBS | OXYGEN SATURATION: 98 % | HEART RATE: 70 BPM | DIASTOLIC BLOOD PRESSURE: 67 MMHG | HEIGHT: 68 IN | BODY MASS INDEX: 40.26 KG/M2 | SYSTOLIC BLOOD PRESSURE: 119 MMHG | TEMPERATURE: 98.5 F | RESPIRATION RATE: 17 BRPM

## 2023-04-27 LAB
ANION GAP SERPL CALC-SCNC: 6 MMOL/L (ref 5–15)
BUN SERPL-MCNC: 31 MG/DL (ref 6–20)
BUN/CREAT SERPL: 25 (ref 12–20)
CALCIUM SERPL-MCNC: 8.5 MG/DL (ref 8.5–10.1)
CHLORIDE SERPL-SCNC: 108 MMOL/L (ref 97–108)
CO2 SERPL-SCNC: 27 MMOL/L (ref 21–32)
CREAT SERPL-MCNC: 1.25 MG/DL (ref 0.55–1.02)
ERYTHROCYTE [DISTWIDTH] IN BLOOD BY AUTOMATED COUNT: 16.5 % (ref 11.5–14.5)
GLUCOSE SERPL-MCNC: 101 MG/DL (ref 65–100)
HCT VFR BLD AUTO: 29.8 % (ref 35–47)
HGB BLD-MCNC: 9.4 G/DL (ref 11.5–16)
MCH RBC QN AUTO: 27.1 PG (ref 26–34)
MCHC RBC AUTO-ENTMCNC: 31.5 G/DL (ref 30–36.5)
MCV RBC AUTO: 85.9 FL (ref 80–99)
NRBC # BLD: 0 K/UL (ref 0–0.01)
NRBC BLD-RTO: 0 PER 100 WBC
PLATELET # BLD AUTO: 250 K/UL (ref 150–400)
PMV BLD AUTO: 9.2 FL (ref 8.9–12.9)
POTASSIUM SERPL-SCNC: 3.7 MMOL/L (ref 3.5–5.1)
RBC # BLD AUTO: 3.47 M/UL (ref 3.8–5.2)
SODIUM SERPL-SCNC: 141 MMOL/L (ref 136–145)
WBC # BLD AUTO: 10.9 K/UL (ref 3.6–11)

## 2023-04-27 PROCEDURE — 74011000250 HC RX REV CODE- 250: Performed by: FAMILY MEDICINE

## 2023-04-27 PROCEDURE — 36573 INSJ PICC RS&I 5 YR+: CPT | Performed by: FAMILY MEDICINE

## 2023-04-27 PROCEDURE — 74011000250 HC RX REV CODE- 250: Performed by: INTERNAL MEDICINE

## 2023-04-27 PROCEDURE — 74011250637 HC RX REV CODE- 250/637: Performed by: NURSE PRACTITIONER

## 2023-04-27 PROCEDURE — 85027 COMPLETE CBC AUTOMATED: CPT

## 2023-04-27 PROCEDURE — 77030020365 HC SOL INJ SOD CL 0.9% 50ML

## 2023-04-27 PROCEDURE — 94762 N-INVAS EAR/PLS OXIMTRY CONT: CPT

## 2023-04-27 PROCEDURE — 36415 COLL VENOUS BLD VENIPUNCTURE: CPT

## 2023-04-27 PROCEDURE — 77010033678 HC OXYGEN DAILY

## 2023-04-27 PROCEDURE — 76937 US GUIDE VASCULAR ACCESS: CPT

## 2023-04-27 PROCEDURE — 74011250636 HC RX REV CODE- 250/636: Performed by: FAMILY MEDICINE

## 2023-04-27 PROCEDURE — C1751 CATH, INF, PER/CENT/MIDLINE: HCPCS

## 2023-04-27 PROCEDURE — 02HV33Z INSERTION OF INFUSION DEVICE INTO SUPERIOR VENA CAVA, PERCUTANEOUS APPROACH: ICD-10-PCS | Performed by: STUDENT IN AN ORGANIZED HEALTH CARE EDUCATION/TRAINING PROGRAM

## 2023-04-27 PROCEDURE — 80048 BASIC METABOLIC PNL TOTAL CA: CPT

## 2023-04-27 PROCEDURE — 94761 N-INVAS EAR/PLS OXIMETRY MLT: CPT

## 2023-04-27 PROCEDURE — 74011250637 HC RX REV CODE- 250/637: Performed by: INTERNAL MEDICINE

## 2023-04-27 PROCEDURE — 94760 N-INVAS EAR/PLS OXIMETRY 1: CPT

## 2023-04-27 RX ORDER — LOSARTAN POTASSIUM 50 MG/1
TABLET ORAL
Qty: 30 TABLET | Refills: 0 | Status: SHIPPED
Start: 2023-04-27

## 2023-04-27 RX ORDER — LANOLIN ALCOHOL/MO/W.PET/CERES
400 CREAM (GRAM) TOPICAL DAILY
Qty: 30 TABLET | Refills: 0 | Status: SHIPPED
Start: 2023-04-27

## 2023-04-27 RX ADMIN — PANTOPRAZOLE SODIUM 40 MG: 40 TABLET, DELAYED RELEASE ORAL at 09:45

## 2023-04-27 RX ADMIN — DULOXETINE HYDROCHLORIDE 60 MG: 60 CAPSULE, DELAYED RELEASE ORAL at 07:27

## 2023-04-27 RX ADMIN — SODIUM CHLORIDE, POTASSIUM CHLORIDE, SODIUM LACTATE AND CALCIUM CHLORIDE 75 ML/HR: 600; 310; 30; 20 INJECTION, SOLUTION INTRAVENOUS at 04:03

## 2023-04-27 RX ADMIN — SODIUM CHLORIDE, PRESERVATIVE FREE 10 ML: 5 INJECTION INTRAVENOUS at 07:27

## 2023-04-27 RX ADMIN — SENNOSIDES AND DOCUSATE SODIUM 1 TABLET: 8.6; 5 TABLET ORAL at 09:45

## 2023-04-27 RX ADMIN — SODIUM CHLORIDE 2 G: 9 INJECTION INTRAMUSCULAR; INTRAVENOUS; SUBCUTANEOUS at 13:56

## 2023-04-27 RX ADMIN — DILTIAZEM HYDROCHLORIDE 240 MG: 240 CAPSULE, EXTENDED RELEASE ORAL at 09:44

## 2023-04-27 NOTE — PROGRESS NOTES
Reviewed overnight oximetry. Suspected probably Obstructive Sleep Apnea. Recommend formal outpatient sleep study. In the interim, would benefit from 2L NC at night.      Nathalia Dow MD

## 2023-04-27 NOTE — PROGRESS NOTES
Patient's O2 sats were consistently between 87 and 89 on RA per continuous pulse ox, placed patient on 2L NC at this time -- pulse ox up to 94%.  Notified RT and NP

## 2023-04-27 NOTE — PROCEDURES
PICC Placement Note        PRE-PROCEDURE VERIFICATION  Correct Procedure: yes  Correct Site:  yes  Temperature: Temp: 98.1 °F (36.7 °C), Temperature Source: Temp Source: Oral  Recent Labs     04/27/23  0734   BUN 31*   CREA 1.25*      WBC 10.9     Allergies: Bactrim [sulfamethoprim], Ciprofloxacin, and Sulfamethoxazole-trimethoprim  Education materials, including PICC Booklet, for PICC Care given to patient: yes. See Patient Education activity for further details. PROCEDURE DETAIL  PICC placed using modified Seldinger method. A single lumen PICC line was started for antibiotic therapy. The following documentation is in addition to the PICC properties in the lines/airways flowsheet :  Lot #: [id number]  Was xylocaine 1% used intradermally:  yes  Catheter Length: 42 (cm)  Catheter to Vein Ratio: 21%  Vein Selection for PICC: right basilic  Central Line Bundle followed yes  Complication Related to Insertion: none    The placement was verified by ECG  technology: The tip location is on the right side and the tip is in the  superior vena cava. See ECG results for PICC tip placement. Report given to [facility], [employer] is okay to use.     Dimitrios Bowman RN

## 2023-04-27 NOTE — PROGRESS NOTES
[address] [facility]  Hospitalist Group                                                                                          Hospitalist Progress Note  Josephine Lott MD  Answering service: [phone] or [phone] from in house phone        Date of Service:  2023  NAME:  Iam Alston  :  1960  MRN:  [id number]      Admission Summary: This is a 58-year-old woman with past medical history significant for hypertension, hypothyroidism, rheumatoid arthritis, degenerative disk disease, supraventricular tachycardia, and kidney stone; presented at the emergency room with abdominal pain. This started about 2 days ago. The pain is located at the left flank with radiation to the groin, constant dull ache, 8/10 in severity, no known aggravating or relieving factors. The patient has had kidney stone in the past that required intervention, and the patient stated that this presentation is similar to when she was diagnosed with kidney stone. When the patient arrived at the emergency room, CT scan of the abdomen and pelvis without contrast was obtained. This shows moderate left-sided hydronephrosis due to 5-mm left ureteral stone. The emergency room physician consulted Urology Service on-call and the patient was taken straight to the operating room from the emergency room for cystoscopy and left ureteral stent insertion. The patient was then referred to the hospitalist service for admission. The patient was last admitted to the hospital from 2020 to 2020. The patient was admitted to the Orthopedic Service and underwent elective left total hip arthroplasty secondary to osteoarthritis of the left hip.        Interval history / Subjective:   Patient seen and examined, reviewed her vitals, labs, careplan, micro results  Showed the patient her CT scan prior to stent placement  Repeat blood cultures sent Monday morning- repeat creatinine showing improvement     Assessment & Plan: 1.  E Coli UTI/ bacteremia from acute pyelonephritis/ left ureteral obstruction and stent placement- - pansensitive but patient allergic to floroquinolones- repeat cultures sent Monday morning-she will likely need to complete 2 weeks of IV antibotics and will need to hold PICC line placement until cultures are 72hrs old which is this Thursday- consulted ID for outpatient IV antibiotic recs. Plans for PICC placement tomorrow and home with IV abx  2. Left ureteral stone with Moderate left-sided hydronephrosis- stone obstructing left ureter- urology consulted, left ureteral stent placed- further tx to be planned outpatient after treatment of infection. 3.  Hyponatremia- correcting w IVFLuids. 4. Psych- depression- stable on home meds  5. Anemia- due to sepis from infection- monitor hemoglobin  6. Hypertension- hypotensive from sepsis/shock - monitor BP- improved today- restarted diltiazem but continue to hold losartan  7. Hypothyroidism- cont levothyroxine. 8.  Rheumatoid arthritis- restarted home meds. 9.  Acute kidney injury. - monitor with IVFluids; creatinine down from 3.7 down to 1.6     Code status: FULL  Prophylaxis: [address] discussed with: patient  Anticipated [address] Problems  Date Reviewed: 4/22/2023            Codes Class Noted POA    * (Principal) Acute pyelonephritis ICD-10-CM: N10  ICD-9-CM: 590.10  4/22/2023 Yes         Review of Systems:   A comprehensive review of systems was negative except for that written in the HPI. Vital Signs:    Last 24hrs VS reviewed since prior progress note.  Most recent are:  Visit Vitals  /66 (BP 1 Location: Left upper arm, BP Patient Position: Sitting)   Pulse 89   Temp 99.1 °F (37.3 °C)   Resp 18   Ht 5' 8\" (1.727 m)   Wt 120.5 kg (265 lb 10.5 oz)   SpO2 94%   BMI 40.39 kg/m²     Patient Vitals for the past 24 hrs:   Temp Pulse Resp BP SpO2   04/26/23 2041 99.1 °F (37.3 °C) 89 18 104/66 94 %   04/26/23 2017 -- 90 -- -- --   04/26/23 1819 -- 87 -- -- --   04/26/23 1540 99.7 °F (37.6 °C) 87 18 123/70 95 %   04/26/23 1430 -- 85 -- -- --   04/26/23 1219 -- 79 -- -- --   04/26/23 1207 98.6 °F (37 °C) 70 18 114/76 93 %   04/26/23 1012 -- 85 -- -- --   04/26/23 0826 98.4 °F (36.9 °C) 88 18 115/66 95 %   04/26/23 0554 -- 74 -- -- --   04/26/23 0353 -- 80 -- -- --   04/26/23 0338 98.5 °F (36.9 °C) 81 18 (!) 150/75 95 %   04/26/23 0155 -- 89 -- -- --   04/25/23 2356 99.1 °F (37.3 °C) 84 18 113/73 95 %   04/25/23 2154 -- 87 -- -- --          Intake/Output Summary (Last 24 hours) at 4/26/2023 2146  Last data filed at 4/26/2023 0800  Gross per 24 hour   Intake 720 ml   Output --   Net 720 ml          Physical Examination:     I had a face to face encounter with this patient and independently examined them on 4/26/2023 as outlined below:          Constitutional:  No acute distress, cooperative, pleasant    ENT:  Oral mucosa moist, oropharynx benign. Resp:  CTA bilaterally. No wheezing/rhonchi/rales. No accessory muscle use. CV:  Regular rhythm, normal rate, no murmurs, gallops, rubs    GI:  Soft, non distended, non tender. normoactive bowel sounds,     Musculoskeletal:  No edema, warm, 2+ pulses throughout    Neurologic:  Moves all extremities. AAOx3, CN II-XII reviewed            Data Review:    Review and/or order of clinical lab test  Review and/or order of tests in the radiology section of [facility]  Review and/or order of tests in the medicine section of [facility]  CT Results  (Last 48 hours)                 04/22/23 1656  CT ABD PELV WO CONT Final result    Impression:      1. Moderate left-sided hydronephrosis due to a 5 mm stone proximal left ureter       Narrative:  EXAM: CT ABD PELV WO CONT       INDICATION: kidney stones/ left flank pain       COMPARISON: None       IV CONTRAST: None. ORAL CONTRAST: None       TECHNIQUE:    Thin axial images were obtained through the abdomen and pelvis.  Coronal and   sagittal reformats were generated. CT dose reduction was achieved through use of   a standardized protocol tailored for this examination and automatic exposure   control for dose modulation. The absence of intravenous contrast material reduces the sensitivity for   evaluation of the vasculature and solid organs. FINDINGS:    LOWER THORAX: Patchy basilar atelectasis   LIVER: No mass. BILIARY TREE: Surgically absent CBD is not dilated. SPLEEN: within normal limits. PANCREAS: No focal abnormality. ADRENALS: Unremarkable. KIDNEYS/URETERS: Moderate left-sided hydronephrosis due to a 5 mm stone proximal   left ureter. Multiple nonobstructing right renal stones   STOMACH: Unremarkable. SMALL BOWEL: No dilatation or wall thickening. COLON: No dilatation or wall thickening. APPENDIX: Not enlarged   PERITONEUM: No ascites or pneumoperitoneum. RETROPERITONEUM: No lymphadenopathy or aortic aneurysm. REPRODUCTIVE ORGANS: Not enlarged   URINARY BLADDER: Compressed but obscured by artifact from the patient's hip   prostheses   BONES: No destructive bone lesion. ABDOMINAL WALL: No mass or hernia.    ADDITIONAL COMMENTS: N/A                 Results       Procedure Component Value Units Date/Time    CULTURE, BLOOD, PAIRED [[id number]] Collected: 04/24/23 0357    Order Status: Completed Specimen: Blood Updated: 04/26/23 0542     Special Requests: NO SPECIAL REQUESTS        Culture result: NO GROWTH 2 DAYS       CULTURE, URINE [[id number]]     Order Status: Canceled Specimen: Urine from Clean catch     CULTURE, BLOOD, PAIRED [[id number]]  (Abnormal)  (Susceptibility) Collected: 04/22/23 1532    Order Status: Completed Specimen: Blood Updated: 04/25/23 1032     Special Requests: NO SPECIAL REQUESTS        Culture result:       Escherichia coli GROWING IN BOTH BOTTLES DRAWN SITES=LAC AND L WRIST            (NOTE) GRAM NEGATIVE RODS GROWING IN BOTH BOTTLES WAS CALLED TO AND READ BACK BY Jose Angel Zaidi RN AT 0617 ON 04/23/2023 BY PIERO Susceptibility        Escherichia coli      NIGEL      Amikacin ($) Susceptible      Ampicillin ($) Susceptible      Ampicillin/sulbactam ($) Susceptible      Cefazolin ($) Susceptible      Cefepime ($$) Susceptible      Cefoxitin Susceptible      Ceftazidime ($) Susceptible      Ceftriaxone ($) Susceptible      Ciprofloxacin ($) Susceptible      Gentamicin ($) Susceptible      Levofloxacin ($) Susceptible      Meropenem ($$) Susceptible      Piperacillin/Tazobac ($) Susceptible      Tobramycin ($) Susceptible      Trimeth/Sulfa Susceptible                           BLOOD CULTURE ID PANEL [[id number]]  (Abnormal) Collected: 04/22/23 1532    Order Status: Completed Specimen: Blood Updated: 04/23/23 0824     Acc. no. from Micro Order [id number]     Enterococcus faecalis Not detected        Enterococcus faecium Not detected        Listeria monocytogenes Not detected        Staphylococcus Not detected        Staphylococcus aureus Not detected        Staph epidermidis Not detected        Staph lugdunensis Not detected        Streptococcus Not detected        Streptococcus agalactiae (Group B) Not detected        Streptococcus pneumoniae Not detected        Streptococcus pyogenes (Group A) Not detected        Acinetobacter calcoaceticus-baumanii complex Not detected        Bacteroides fragilis Not detected        Enterobacterales species Detected        Enterobacter cloacae complex Not detected        Escherichia coli Detected        Klebsiella aerogenes Not detected        Klebsiella oxytoca Not detected        Klebsiella pneumoniae group Not detected        Proteus Not detected        Salmonella Not detected        Serratia marcescens Not detected        Haemophilus influenzae Not detected        Neisseria meningitidis Not detected        Pseudomonas aeruginosa Not detected        Steno maltophilia Not detected        Candida albicans Not detected        Candida auris Not detected        Candida glabrata Not detected Felisa krusei Not detected        Candida parapsilosis Not detected        Candida tropicalis Not detected        Crypto neoformans/gattii Not detected        RESISTANT GENES:            CTX-M (ESBL resistant gene) Not detected        IMP (Carbapenemase resistant gene) Not detected        KPC (Carbapenem Resistance Gene) Not detected        mcr-1 (Colistin resistant gene) Not detected        NDM (Carbapenemase resistant gene) Not detected        OXA-48-like (Carbapenemase resistant gene) Not detected        VIM (Carbapenemase resistant gene) Not detected        Comment       False positive results may rarely occur. Correlate with clinical,epidemiologic, and other laboratory findings           Comment: Please see BCID Interpretation Guide in EPIC Links       URINE CULTURE HOLD SAMPLE [[id number]] Collected: 04/22/23 1325    Order Status: Completed Specimen: Urine Updated: 04/22/23 1328     Urine culture hold       Urine on hold in Microbiology dept for 2 days. If unpreserved urine is submitted, it cannot be used for addtional testing after 24 hours, recollection will be required.           CULTURE, URINE [[id number]]  (Abnormal)  (Susceptibility) Collected: 04/22/23 1325    Order Status: Completed Specimen: Urine from Clean catch Updated: 04/24/23 0714     Special Requests: NO SPECIAL REQUESTS        [place] Count --        >100,000  COLONIES/mL       Culture result: Escherichia coli       Susceptibility        Escherichia coli      NIGEL      Amikacin ($) Susceptible      Ampicillin ($) Susceptible      Ampicillin/sulbactam ($) Susceptible      Cefazolin ($) Susceptible      Cefepime ($$) Susceptible      Cefoxitin Susceptible      Ceftazidime ($) Susceptible      Ceftriaxone ($) Susceptible      Ciprofloxacin ($) Susceptible      Gentamicin ($) Susceptible      Levofloxacin ($) Susceptible      Meropenem ($$) Susceptible      Nitrofurantoin Susceptible      Piperacillin/Tazobac ($) Susceptible      Tobramycin ($) Susceptible      Trimeth/Sulfa Susceptible                                       Labs:     Recent Labs     04/26/23  0618 04/25/23  0722   WBC 8.6 9.1   HGB 10.3* 11.5   HCT 33.3* 38.2    217       Recent Labs     04/26/23  0618 04/25/23  0722 04/24/23  0806    133* 136   K 3.5 4.0 3.9    104 105   CO2 25 25 25   BUN 43* 50* 57*   CREA 1.67* 2.26* 2.86*   GLU 99 111* 90   CA 8.4* 8.9 9.1       No results for input(s): ALT, AP, TBIL, TBILI, TP, ALB, GLOB, GGT, AML, LPSE in the last 72 hours. No lab exists for component: SGOT, GPT, AMYP, HLPSE    No results for input(s): INR, PTP, APTT, INREXT, INREXT in the last 72 hours. No results for input(s): FE, TIBC, PSAT, FERR in the last 72 hours. No results found for: FOL, RBCF   No results for input(s): PH, PCO2, PO2 in the last 72 hours. No results for input(s): CPK, CKNDX, TROIQ in the last 72 hours.     No lab exists for component: CPKMB  Lab Results   Component Value Date/Time    Cholesterol, total 219 (H) 08/24/2022 09:00 AM    HDL Cholesterol 40 08/24/2022 09:00 AM    LDL, calculated 142 (H) 08/24/2022 09:00 AM    LDL, calculated 81 02/20/2019 03:23 PM    Triglyceride 203 (H) 08/24/2022 09:00 AM     Lab Results   Component Value Date/Time    Glucose (POC) 88 12/10/2019 10:57 AM     Lab Results   Component Value Date/Time    Color DARK YELLOW 04/22/2023 01:25 PM    Appearance TURBID (A) 04/22/2023 01:25 PM    Specific gravity 1.022 04/22/2023 01:25 PM    pH (UA) 5.0 04/22/2023 01:25 PM    Protein 300 (A) 04/22/2023 01:25 PM    Glucose Negative 04/22/2023 01:25 PM    Ketone TRACE (A) 04/22/2023 01:25 PM    Bilirubin Negative 06/09/2020 11:48 AM    Urobilinogen 1.0 04/22/2023 01:25 PM    Nitrites Negative 04/22/2023 01:25 PM    Leukocyte Esterase LARGE (A) 04/22/2023 01:25 PM    Epithelial cells FEW 04/22/2023 01:25 PM    Bacteria 2+ (A) 04/22/2023 01:25 PM    WBC >100 (H) 04/22/2023 01:25 PM    RBC 5-10 04/22/2023 01:25 PM         Medications Reviewed:     Current Facility-Administered Medications   Medication Dose Route Frequency    senna-docusate (PERICOLACE) 8.6-50 mg per tablet 1 Tablet  1 Tablet Oral DAILY    cefTRIAXone (ROCEPHIN) 2 g in 0.9% sodium chloride 20 mL IV syringe  2 g IntraVENous Q24H    calcium carbonate (OS-LIAM) tablet 1,000 mg [elemental]  1,000 mg Oral QHS    dilTIAZem ER (CARDIZEM CD) capsule 240 mg  240 mg Oral DAILY    DULoxetine (CYMBALTA) capsule 60 mg  60 mg Oral 7am    levothyroxine (SYNTHROID) tablet 125 mcg  125 mcg Oral QHS    pantoprazole (PROTONIX) tablet 40 mg  40 mg Oral DAILY    sodium chloride (NS) flush 5-40 mL  5-40 mL IntraVENous Q8H    sodium chloride (NS) flush 5-40 mL  5-40 mL IntraVENous PRN    acetaminophen (TYLENOL) tablet 650 mg  650 mg Oral Q6H PRN    Or    acetaminophen (TYLENOL) suppository 650 mg  650 mg Rectal Q6H PRN    polyethylene glycol (MIRALAX) packet 17 g  17 g Oral DAILY PRN    ondansetron (ZOFRAN ODT) tablet 4 mg  4 mg Oral Q8H PRN    Or    ondansetron (ZOFRAN) injection 4 mg  4 mg IntraVENous Q6H PRN    oxyCODONE-acetaminophen (PERCOCET) 5-325 mg per tablet 1 Tablet  1 Tablet Oral Q4H PRN    HYDROmorphone (DILAUDID) injection 1 mg  1 mg IntraVENous Q4H PRN    lactated Ringers infusion  100 mL/hr IntraVENous CONTINUOUS     ______________________________________________________________________  EXPECTED LENGTH OF STAY: 9d 14h  ACTUAL LENGTH OF STAY:          4                 Skye Francois MD

## 2023-04-27 NOTE — PROGRESS NOTES
Patient out of bed to sleep in recliner for now, patient left on room air and 02 sats holding at 92 %

## 2023-04-27 NOTE — PROGRESS NOTES
Discharge instructions reviewed with patient, opportunity for questions provided. Patient verbalized understanding and was discharged home.

## 2023-04-27 NOTE — PROGRESS NOTES
Infectious Disease Progress Note       Subjective: The patient was personally evaluated and examined by me at bedside today. She is sitting up out of bed in chair and states she is feeling great. She had episode of oxygen desaturation  Which was of concern with PMD advised outpatient sleep study for patient with likely MONALISA in the setting of BMI 40.39. The patient denies headache, fevers, sweats, or chills. She states that she is breathing fine and has no shortness of breath, cough, sputum, or chest pain. She denies smoking history. Patient states that she is eating, has no nausea, no vomiting, no diarrhea and no constipation. Continue. She states that she is urinating without difficulty and that she has no flank pain. She tells me that the plan for discharge is to follow-up with urology outpatient for stent removal and possible lithotripsy. The patient tells me that she feels well and is eager for discharge to home.         Objective:    Vitals:   Patient Vitals for the past 24 hrs:   Temp Pulse Resp BP SpO2   04/27/23 1432 -- 81 -- -- --   04/27/23 1226 -- 82 -- -- --   04/27/23 1211 98.8 °F (37.1 °C) 84 20 120/66 95 %   04/27/23 1030 -- 85 -- -- --   04/27/23 0850 98.1 °F (36.7 °C) 80 20 115/75 94 %   04/27/23 0800 -- 80 -- -- --   04/27/23 0555 -- 79 -- -- --   04/27/23 0532 -- -- -- -- 94 %   04/27/23 0530 -- -- -- -- (!) 86 %   04/27/23 0452 99.1 °F (37.3 °C) 82 20 126/80 93 %   04/27/23 0356 -- 92 -- -- --   04/27/23 0301 -- -- -- -- 92 %   04/27/23 0156 -- 82 -- -- --   04/27/23 0115 -- -- -- -- 94 %   04/27/23 0110 -- -- -- -- (!) 87 %   04/27/23 0100 -- -- -- -- (!) 88 %   04/27/23 0034 98.3 °F (36.8 °C) 86 18 (!) 146/81 90 %   04/26/23 2312 -- -- -- -- (!) 88 %   04/26/23 2153 -- 88 -- -- --   04/26/23 2041 99.1 °F (37.3 °C) 89 18 104/66 94 %   04/26/23 2017 -- 90 -- -- --   04/26/23 1819 -- 87 -- -- --   04/26/23 1540 99.7 °F (37.6 °C) 87 18 123/70 95 %       Physical Exam:  Gen: No apparent distress,calm,comfortable relaxed  HEENT:  Normocephalic, atraumatic, no scleral icterus, no thrush, no cervical lymphadenopathy  CV: S1,2 heard regularly, mild lower extremity edema bilaterally  Lungs: Clear to auscultation bilaterally, no wheezing,decreased breath sounds bilaterally  Abdomen: soft, non tender, non distended, no CVA tenderness ,central adiposity  Genitourinary:  deferred  Skin: no rash ,no pruritis  Psych: good affect, good eye contact,pleasant, conversant,cooperative  Neuro: alert, oriented to time,  place, and situation, moves all extremities to commands, verbal  Musculoskeletal:  No joint edema, erythema or tenderness noted   Lines:     Medications:    Current Facility-Administered Medications:     senna-docusate (PERICOLACE) 8.6-50 mg per tablet 1 Tablet, 1 Tablet, Oral, DAILY, KeiryWilda NP, 1 Tablet at 04/27/23 0945    cefTRIAXone (ROCEPHIN) 2 g in 0.9% sodium chloride 20 mL IV syringe, 2 g, IntraVENous, Q24H, Cora Garza MD, 2 g at 04/27/23 1356    calcium carbonate (OS-LIAM) tablet 1,000 mg [elemental], 1,000 mg, Oral, QHS, Kaylan Mcdonald MD, 1,000 mg at 04/26/23 2211    dilTIAZem ER (CARDIZEM CD) capsule 240 mg, 240 mg, Oral, DAILY, Kaylan Mcdonald MD, 240 mg at 04/27/23 0944    DULoxetine (CYMBALTA) capsule 60 mg, 60 mg, Oral, 7am, Kaylan Mcdonald MD, 60 mg at 04/27/23 4923    levothyroxine (SYNTHROID) tablet 125 mcg, 125 mcg, Oral, QHS, Kaylan Mcdonald MD, 125 mcg at 04/26/23 2211    pantoprazole (PROTONIX) tablet 40 mg, 40 mg, Oral, DAILY, Kaylan Mcdonald MD, 40 mg at 04/27/23 0945    sodium chloride (NS) flush 5-40 mL, 5-40 mL, IntraVENous, Q8H, Kaylan Mcdonald MD, 10 mL at 04/27/23 0727    sodium chloride (NS) flush 5-40 mL, 5-40 mL, IntraVENous, PRN, Kaylan Mcdonald MD    acetaminophen (TYLENOL) tablet 650 mg, 650 mg, Oral, Q6H PRN, 650 mg at 04/24/23 8147 **OR** acetaminophen (TYLENOL) suppository 650 mg, 650 mg, Rectal, Q6H PRN, Kaylan Mcdonald MD JULIAAN    polyethylene glycol (MIRALAX) packet 17 g, 17 g, Oral, DAILY PRN, Kaylan Mcdonald MD    ondansetron (ZOFRAN ODT) tablet 4 mg, 4 mg, Oral, Q8H PRN **OR** ondansetron (ZOFRAN) injection 4 mg, 4 mg, IntraVENous, Q6H PRN, Kaylan Mcdonald MD    oxyCODONE-acetaminophen (PERCOCET) 5-325 mg per tablet 1 Tablet, 1 Tablet, Oral, Q4H PRN, Kaylan Bray MD, 1 Tablet at 04/23/23 2023    HYDROmorphone (DILAUDID) injection 1 mg, 1 mg, IntraVENous, Q4H PRN, Kaylan Mcdonald MD    lactated Ringers infusion, 75 mL/hr, IntraVENous, CONTINUOUS, Mohinder Smith MD, Last Rate: 75 mL/hr at 04/27/23 0403, 75 mL/hr at 04/27/23 0403      Labs:  Recent Results (from the past 24 hour(s))   CBC W/O DIFF    Collection Time: 04/27/23  7:34 AM   Result Value Ref Range    WBC 10.9 3.6 - 11.0 K/uL    RBC 3.47 (L) 3.80 - 5.20 M/uL    HGB 9.4 (L) 11.5 - 16.0 g/dL    HCT 29.8 (L) 35.0 - 47.0 %    MCV 85.9 80.0 - 99.0 FL    MCH 27.1 26.0 - 34.0 PG    MCHC 31.5 30.0 - 36.5 g/dL    RDW 16.5 (H) 11.5 - 14.5 %    PLATELET 074 878 - 330 K/uL    MPV 9.2 8.9 - 12.9 FL    NRBC 0.0 0  WBC    ABSOLUTE NRBC 0.00 0.00 - 0.70 K/uL   METABOLIC PANEL, BASIC    Collection Time: 04/27/23  7:34 AM   Result Value Ref Range    Sodium 141 136 - 145 mmol/L    Potassium 3.7 3.5 - 5.1 mmol/L    Chloride 108 97 - 108 mmol/L    CO2 27 21 - 32 mmol/L    Anion gap 6 5 - 15 mmol/L    Glucose 101 (H) 65 - 100 mg/dL    BUN 31 (H) 6 - 20 MG/DL    Creatinine 1.25 (H) 0.55 - 1.02 MG/DL    BUN/Creatinine ratio 25 (H) 12 - 20      eGFR 48 (L) >60 ml/min/1.73m2    Calcium 8.5 8.5 - 10.1 MG/DL           Micro:     Blood: E.coli 2/2 bottles without leukocytosis ,mild leukocytosis      Urine: E.coli pyelonephritis     Synovial/Joint fluid:     Sputum/BAL/Pleural:     Peritoneal:      Pathology:      Imaging: CT abdomen/pelvis on admission with moderate hydro-nephritis,5 mm stone proximal L ureter      Assessment / Plan    E.coli bacteremia without endocarditis on ECHO with likely source from pyelonephritis/urinary tract source  Nephrolithiasis with imaging showing stone L.  Ureter  Stent placement L ureter   Follow up out patient urology for stent removal and possible lithotripsy   PICC line insertion for outpatient antibiotic to complete 14 days last dose 05/08/23      Problem List as of 4/27/2023 Date Reviewed: 4/22/2023            Codes Class Noted - Resolved    * (Principal) Acute pyelonephritis ICD-10-CM: N10  ICD-9-CM: 590.10  4/22/2023 - Present        Osteoarthritis of left hip ICD-10-CM: M16.12  ICD-9-CM: 715.95  6/16/2020 - Present        Primary localized osteoarthritis of right hip ICD-10-CM: M16.11  ICD-9-CM: 715.15  12/10/2019 - Present        Severe obesity ([facility] ICD-10-CM: E66.01  ICD-9-CM: 278.01  2/20/2019 - Present        Type 2 diabetes with nephropathy ([facility] ICD-10-CM: E11.21  ICD-9-CM: 250.40, 583.81  3/12/2018 - Present        SVT (supraventricular tachycardia) ([facility] ICD-10-CM: I47.1  ICD-9-CM: 427.89  11/7/2017 - Present        Class 2 obesity due to excess calories without serious comorbidity with body mass index (BMI) of 35.0 to 35.9 in adult ICD-10-CM: E66.09, Z68.35  ICD-9-CM: 278.00, V85.35  11/7/2017 - Present        DM (diabetes mellitus) ([facility] ICD-10-CM: E11.9  ICD-9-CM: 250.00  3/11/2014 - Present        Obesity ICD-10-CM: E66.9  ICD-9-CM: 278.00  3/11/2014 - Present        Migraine with aura and without status migrainosus, not intractable ICD-10-CM: G43.109  ICD-9-CM: 346.00  4/23/2013 - Present        Rheumatoid arthritis ([facility] ICD-10-CM: M06.9  ICD-9-CM: 714.0  4/23/2013 - Present        Hypothyroid ICD-10-CM: E03.9  ICD-9-CM: 244.9  4/23/2013 - Present        Lumbar post-laminectomy syndrome ICD-10-CM: M96.1  ICD-9-CM: 722.83  4/23/2013 - Present        Melanoma ([facility] ICD-10-CM: C43.9  ICD-9-CM: 172.9  4/23/2013 - Present        H/O umbilical hernia repair LIT-10-WD: E32.758, Z87.19  ICD-9-CM: V15.29  4/23/2013 - Present        Depression ICD-10-CM: F32. A  ICD-9-CM: 957  4/23/2013 - Present                  Thank you for the opportunity to participate in the care of this patient. Please contact with questions or concerns.       Eva Jones MD no discrete location documentation necessary

## 2023-04-27 NOTE — PROGRESS NOTES
Bedside shift change report given to Claudia (oncoming nurse) by Carlita Spence (offgoing nurse). Report included the following information SBAR, Kardex, Intake/Output, and MAR.

## 2023-04-27 NOTE — ANCILLARY DISCHARGE INSTRUCTIONS
INFECTIOUS DISEASE discharge plan:    PICC line insertion for IV antibiotic administration  PICC line care per infusion protocol   Ceftriaxone 2 Gm Q 24H with last dose 05/08/23  PICC line may be removed after last dose  No outpatient labs   Follow up urology as scheduled for eventual stent removal   Return to ED for severe flank pain or high fever

## 2023-04-27 NOTE — PROGRESS NOTES
Received order for pt to have overnight pulse-oximetry study eval for home O2. Overnight oximeter placed on pt at bedside and recording started. SpO2 and HR at initiation: 88% and 83bpm.    Pt in RA at time of start. Will download pt data in the morning.      04/26/23 2312   Oxygen Therapy   O2 Sat (%) (!) 88 %   Pulse via Oximetry 83 beats per minute   O2 Device None (Room air)

## 2023-04-27 NOTE — DISCHARGE INSTRUCTIONS
Discharge Instructions       PATIENT ID: Simi Murphy  MRN: [id number]   YOB: 1960    DATE OF ADMISSION: 4/22/2023   DATE OF DISCHARGE: 4/27/2023    PRIMARY CARE PROVIDER: Isaac Marie     ATTENDING PHYSICIAN: Mayur Sosa MD   DISCHARGING PROVIDER: Hugo Montana MD    To contact this individual call [phone] and ask the  to page. If unavailable ask to be transferred the Adult Hospitalist Department. DISCHARGE DIAGNOSES Bacteremia     CONSULTATIONS:  IP CONSULT TO UROLOGY  IP CONSULT TO INFECTIOUS DISEASES    PROCEDURES/SURGERIES: Procedure(s):  CYSTOSCOPY LEFT URETERAL STENT INSERTION PLACEMENT OF GAVIN CATHETER    PENDING TEST RESULTS:   At the time of discharge the following test results are still pending: None    FOLLOW UP APPOINTMENTS:   PCP, Urology    ADDITIONAL CARE RECOMMENDATIONS: You were admitted and found to require a stent placement by urology. You developed an infection that spread to your blood stream. You will continue IV antibiotics through 05/08/23. Please be sure to follow-up with Urology after discharge for stent removal.     Please do not take your Losartan until you see your PCP to have your kidney function reassessed. DIET: Resume previous diet    ACTIVITY: Activity as tolerated    WOUND CARE: None    EQUIPMENT needed: None      DISCHARGE MEDICATIONS:   See Medication Reconciliation Form    It is important that you take the medication exactly as they are prescribed. Keep your medication in the bottles provided by the pharmacist and keep a list of the medication names, dosages, and times to be taken in your wallet. Do not take other medications without consulting your doctor. NOTIFY YOUR PHYSICIAN FOR ANY OF THE FOLLOWING:   Fever over 101 degrees for 24 hours. Chest pain, shortness of breath, fever, chills, nausea, vomiting, diarrhea, change in mentation, falling, weakness, bleeding.  Severe pain or pain not relieved by medications. Or, any other signs or symptoms that you may have questions about.         Signed:   Jazmyn Burgess MD  4/27/2023  4:55 PM

## 2023-04-27 NOTE — PROGRESS NOTES
Problem: General Medical Care Plan  Goal: *Vital signs within specified parameters  Outcome: Resolved/Met  Goal: *Labs within defined limits  Outcome: Resolved/Met  Goal: *Absence of infection signs and symptoms  Outcome: Resolved/Met  Goal: *Optimal pain control at patient's stated goal  Outcome: Resolved/Met  Goal: *Skin integrity maintained  Outcome: Resolved/Met  Goal: *Fluid volume balance  Outcome: Resolved/Met  Goal: *Optimize nutritional status  Outcome: Resolved/Met  Goal: *Anxiety reduced or absent  Outcome: Resolved/Met  Goal: *Progressive mobility and function (eg: ADL's)  Outcome: Resolved/Met     Problem: Patient Education: Go to Patient Education Activity  Goal: Patient/Family Education  Outcome: Resolved/Met     Problem: Infection - Risk of, Urinary Catheter-Associated Urinary Tract Infection  Goal: *Absence of infection signs and symptoms  Outcome: Resolved/Met     Problem: Patient Education: Go to Patient Education Activity  Goal: Patient/Family Education  Outcome: Resolved/Met     Problem: Falls - Risk of  Goal: *Absence of Falls  Description: Document Kenny Fall Risk and appropriate interventions in the flowsheet.   Outcome: Resolved/Met     Problem: Patient Education: Go to Patient Education Activity  Goal: Patient/Family Education  Outcome: Resolved/Met

## 2023-04-27 NOTE — PROGRESS NOTES
Transitions of care:  Pt accepted by [facility] to complete course of IV abx at home. Order approved for delivery for home nocturnal oxygen but home oxygen was declined by patient to vendor ([employer]). Per [employer] (also called [facility]), orders for home IV abx must be received by 3:30pm today in order to approve home delivery later today. Updated attending of this. 3:30pm IV abx orders received and uploaded these and labs to [facility] referral for [facility] as requested. Pt discharging after receiving today's dose. Barrier to discharge:  final IV antibiotics order in order to complete discharge plan and for [facility] at [facility]. 11:15am  PICC orders uploaded to [employer] who informed CM today that pt WILL need a separate [address] which is new information from conversation yesterday. Referral sent to [facility], awaiting response. Pt also was tested for nocturnal oxygen overnight. CM will upload report into [employer] to determine if this can be set up for home use. Order placed in parachute with diagnosis of probable sleep apnea per attending. Once order processed, oxygen to be delivered to patient's home for nocturnal use. 12:30pm  [employer] [facility] is processing nocturnal oxygen order. Oxygen overnight testing uploaded to order in parachute. Set up will be delivered to pt's home address. [facility] cannot accept for Ramakrishna Robin rn.  [facility] Keller can do nursing if pt goes to their outpatient suite, pt in agreement. Unit CM to follow.     Des Miller   [phone]

## 2023-04-27 NOTE — DISCHARGE SUMMARY
Discharge Summary       PATIENT ID: Lucrecia Jo  MRN: [id number]   YOB: 1960    DATE OF ADMISSION: 4/22/2023  1:09 PM    DATE OF DISCHARGE: 04/27/23    PRIMARY CARE PROVIDER: Arabella Carrasquillo MD     ATTENDING PHYSICIAN: Corbin Henriquez MD   DISCHARGING PROVIDER: Corbin Henriquez MD    To contact this individual call [phone] and ask the  to page. If unavailable ask to be transferred the Adult Hospitalist Department. CONSULTATIONS: IP CONSULT TO UROLOGY  IP CONSULT TO INFECTIOUS DISEASES    PROCEDURES/SURGERIES: Procedure(s):  CYSTOSCOPY LEFT URETERAL STENT INSERTION PLACEMENT OF GAVIN CATHETER    ADMITTING [address] [place] [address] COURSE:   1. Suspected acute pyelonephritis. 2.  Left ureteral stone. 3.  Moderate left-sided hydronephrosis. 4.  Morbid obesity. 5.  Hyponatremia. 6.  Hypertension. 7.  Hypothyroidism. 8.  Rheumatoid arthritis. 9.  Acute kidney injury. Lucrecia Jo is a  57 y.o. female who presented with abdominal pain and was found to have an obstructing left ureteral stone with left-sided hydronephrosis. She was evaluated by urology and a left ureteral stent was placed. Patient was also found to have E. coli bacteremia from acute pyelonephritis and ureteral obstruction. She was treated with IV antibiotics, which she will continue as an outpatient through 5/08/23. Patient was discharged with PICC line. Patient to follow-up with urology for stent removal and reassessment. On the day of discharge patient was feeling well and stable for discharge home. DISCHARGE DIAGNOSES / PLAN:      E. coli bacteremia from acute colitis and left ureteral obstruction: Continue IV antibiotics through 5/08/23.    Left ureteral stone with moderate left-sided hydronephrosis s/p stent placement: Follow-up with urology for stent removal  Hyponatremia, resolved  Chronic depression, stable  Anemia, stable  Hypertension: Continue home diltiazem, hold home losartan until follow-up with PCP  Hypothyroidism  Rheumatoid arthritis  VALDEMAR: Initial creatinine 3.7, decreased to 1.25 on the day of discharge. Follow-up with PCP for recheck. PENDING TEST RESULTS:   At the time of discharge the following test results are still pending: Final blood cultures with NGTD x 3 days at the time of discharge. FOLLOW UP APPOINTMENTS:    Follow-up Information       Follow up With Specialties Details Why [facility] Go on 5/10/2023 3:10 PM with Dr. Kenny Rodriguez for kidney stone [address]  [address] [facility]  Follow up on 4/27/2023 Now called [facility], IV abx meds for home [address] [facility]  Follow up on 4/27/2023 Now called [employer] [facility],  Will deliver nocturnal oxygen set up to home address. [address]  [phone]    Agustin Ceballos MD Family Medicine   [address] [place] [phone]               ADDITIONAL CARE RECOMMENDATIONS: You were admitted and found to require a stent placement by urology. You developed an infection that spread to your blood stream. You will continue IV antibiotics through 05/08/23. Please be sure to follow-up with Urology after discharge for stent removal.     Please do not take your Losartan until you see your PCP to have your kidney function reassessed. DIET: Resume previous diet    ACTIVITY: Activity as tolerated    WOUND CARE: None    EQUIPMENT needed: None      DISCHARGE MEDICATIONS:  Current Discharge Medication List        CONTINUE these medications which have CHANGED    Details   losartan (COZAAR) 50 mg tablet STOP this until you follow-up with your PCP for recheck of your lab work. Qty: 30 Tablet, Refills: 0  Start date: 8/10/9454      folic acid 260 mcg tablet Take 1 Tablet by mouth daily.   Qty: 30 Tablet, Refills: 0  Start date: 4/27/2023 CONTINUE these medications which have NOT CHANGED    Details   dilTIAZem ER (CARDIZEM CD) 240 mg capsule TAKE ONE CAPSULE BY MOUTH ONE TIME DAILY  Qty: 90 Capsule, Refills: 3      etanercept (EnbreL) 50 mg/mL (1 mL) injection 1 mL. acetaminophen (TYLENOL) 500 mg tablet Take 1 Tab by mouth every four (4) hours. Qty: 100 Tab, Refills: 0      calcium carbonate (OS-LIAM) 500 mg calcium (1,250 mg) tablet Take 2 Tablets by mouth nightly. pantoprazole (PROTONIX) 40 mg tablet Take 1 Tablet by mouth daily. celecoxib (CELEBREX) 100 mg capsule Take 1 Capsule by mouth daily. aspirin delayed-release 81 mg tablet Take 1 Tab by mouth two (2) times a day. Indications: blood clot prevention  Qty: 60 Tab, Refills: 0      ascorbic acid, vitamin C, (Vitamin C) 250 mg chew Take 1 Tablet by mouth nightly. diclofenac EC (VOLTAREN) 75 mg EC tablet Take 1 Tablet by mouth two (2) times a day. DULoxetine (CYMBALTA) 60 mg capsule Take 1 Capsule by mouth every morning.      b-complex with vitamin c cap capsule Take 1 Capsule by mouth nightly. methotrexate (RHEUMATREX) 2.5 mg tablet Take 4 Tablets by mouth every Monday. Associated Diagnoses: Migraine with aura, without mention of intractable migraine without mention of status migrainosus; Rheumatoid arthritis(714.0); Lumbar post-laminectomy syndrome      levothyroxine (SYNTHROID) 125 mcg tablet Take 1 Tablet by mouth nightly. Associated Diagnoses: Migraine with aura, without mention of intractable migraine without mention of status migrainosus; Rheumatoid arthritis(714.0); Lumbar post-laminectomy syndrome      multivitamin (ONE A DAY) tablet Take 2 Tabs by mouth nightly. Associated Diagnoses: Migraine with aura, without mention of intractable migraine without mention of status migrainosus; Rheumatoid arthritis(714.0);  Lumbar post-laminectomy syndrome           STOP taking these medications       cephALEXin (KEFLEX) 500 mg capsule Comments: Reason for Stopping:                 NOTIFY YOUR PHYSICIAN FOR ANY OF THE FOLLOWING:   Fever over 101 degrees for 24 hours. Chest pain, shortness of breath, fever, chills, nausea, vomiting, diarrhea, change in mentation, falling, weakness, bleeding. Severe pain or pain not relieved by medications. Or, any other signs or symptoms that you may have questions about.     DISPOSITION:   x Home With:   OT  PT  HH  RN       Long term SNF/Inpatient Rehab    Independent/assisted living    Hospice    Other:       PATIENT CONDITION AT DISCHARGE:     Functional status    Poor     Deconditioned    x Independent      Cognition    x Lucid     Forgetful     Dementia      Catheters/lines (plus indication)    Dhillon    x PICC     PEG     None      Code status   x  Full code     DNR      PHYSICAL EXAMINATION AT DISCHARGE:    General : alert x 3, awake, no acute distress,   HEENT: PEERL, EOMI, moist mucus membrane  Neck: supple, no JVD, no meningeal signs  Chest: Clear to auscultation bilaterally   CVS: S1 S2 heard, Capillary refill less than 2 seconds  Abd: soft/ Non tender, non distended, BS physiological,   Ext: no clubbing, no cyanosis, no edema, brisk 2+ DP pulses  Neuro/Psych: pleasant mood and affect, CN 2-12 grossly intact, sensory grossly within normal limit  Skin: warm     CHRONIC MEDICAL DIAGNOSES:  Problem List as of 4/27/2023 Date Reviewed: 4/22/2023            Codes Class Noted - Resolved    * (Principal) Acute pyelonephritis ICD-10-CM: N10  ICD-9-CM: 590.10  4/22/2023 - Present        Osteoarthritis of left hip ICD-10-CM: M16.12  ICD-9-CM: 715.95  6/16/2020 - Present        Primary localized osteoarthritis of right hip ICD-10-CM: M16.11  ICD-9-CM: 715.15  12/10/2019 - Present        Severe obesity ([facility] ICD-10-CM: E66.01  ICD-9-CM: 278.01  2/20/2019 - Present        Type 2 diabetes with nephropathy ([facility] ICD-10-CM: E11.21  ICD-9-CM: 250.40, 583.81  3/12/2018 - Present        SVT (supraventricular tachycardia) (HCC) ICD-10-CM: I47.1  ICD-9-CM: 427.89  11/7/2017 - Present        Class 2 obesity due to excess calories without serious comorbidity with body mass index (BMI) of 35.0 to 35.9 in adult ICD-10-CM: E66.09, Z68.35  ICD-9-CM: 278.00, V85.35  11/7/2017 - Present        DM (diabetes mellitus) ([facility] ICD-10-CM: E11.9  ICD-9-CM: 250.00  3/11/2014 - Present        Obesity ICD-10-CM: E66.9  ICD-9-CM: 278.00  3/11/2014 - Present        Migraine with aura and without status migrainosus, not intractable ICD-10-CM: G43.109  ICD-9-CM: 346.00  4/23/2013 - Present        Rheumatoid arthritis ([facility] ICD-10-CM: M06.9  ICD-9-CM: 714.0  4/23/2013 - Present        Hypothyroid ICD-10-CM: E03.9  ICD-9-CM: 244.9  4/23/2013 - Present        Lumbar post-laminectomy syndrome ICD-10-CM: M96.1  ICD-9-CM: 722.83  4/23/2013 - Present        Melanoma ([facility] ICD-10-CM: C43.9  ICD-9-CM: 172.9  4/23/2013 - Present        H/O umbilical hernia repair CBV-26-TQ: F12.440, Z87.19  ICD-9-CM: V15.29  4/23/2013 - Present        Depression ICD-10-CM: F32. A  ICD-9-CM: 789  4/23/2013 - Present           Greater than 31 minutes were spent with the patient on counseling and coordination of care    Signed:   Gurpreet Keller MD  4/27/2023  4:55 PM

## 2023-04-29 LAB
BACTERIA SPEC CULT: NORMAL
SERVICE CMNT-IMP: NORMAL

## 2023-07-12 RX ORDER — DILTIAZEM HYDROCHLORIDE 240 MG/1
CAPSULE, COATED, EXTENDED RELEASE ORAL
Qty: 90 CAPSULE | Refills: 0 | Status: SHIPPED | OUTPATIENT
Start: 2023-07-12

## 2023-09-15 ENCOUNTER — OFFICE VISIT (OUTPATIENT)
Age: 63
End: 2023-09-15
Payer: MEDICARE

## 2023-09-15 VITALS
HEIGHT: 68 IN | HEART RATE: 64 BPM | BODY MASS INDEX: 39.71 KG/M2 | DIASTOLIC BLOOD PRESSURE: 80 MMHG | OXYGEN SATURATION: 97 % | WEIGHT: 262 LBS | RESPIRATION RATE: 16 BRPM | SYSTOLIC BLOOD PRESSURE: 130 MMHG

## 2023-09-15 DIAGNOSIS — I10 HTN (HYPERTENSION), BENIGN: Primary | ICD-10-CM

## 2023-09-15 PROCEDURE — 3079F DIAST BP 80-89 MM HG: CPT | Performed by: INTERNAL MEDICINE

## 2023-09-15 PROCEDURE — 99214 OFFICE O/P EST MOD 30 MIN: CPT | Performed by: INTERNAL MEDICINE

## 2023-09-15 PROCEDURE — 1036F TOBACCO NON-USER: CPT | Performed by: INTERNAL MEDICINE

## 2023-09-15 PROCEDURE — G8427 DOCREV CUR MEDS BY ELIG CLIN: HCPCS | Performed by: INTERNAL MEDICINE

## 2023-09-15 PROCEDURE — G8417 CALC BMI ABV UP PARAM F/U: HCPCS | Performed by: INTERNAL MEDICINE

## 2023-09-15 PROCEDURE — 1090F PRES/ABSN URINE INCON ASSESS: CPT | Performed by: INTERNAL MEDICINE

## 2023-09-15 PROCEDURE — G8400 PT W/DXA NO RESULTS DOC: HCPCS | Performed by: INTERNAL MEDICINE

## 2023-09-15 PROCEDURE — 3017F COLORECTAL CA SCREEN DOC REV: CPT | Performed by: INTERNAL MEDICINE

## 2023-09-15 PROCEDURE — 1123F ACP DISCUSS/DSCN MKR DOCD: CPT | Performed by: INTERNAL MEDICINE

## 2023-09-15 PROCEDURE — 3075F SYST BP GE 130 - 139MM HG: CPT | Performed by: INTERNAL MEDICINE

## 2023-09-15 RX ORDER — DILTIAZEM HYDROCHLORIDE 240 MG/1
240 CAPSULE, COATED, EXTENDED RELEASE ORAL DAILY
Qty: 90 CAPSULE | Refills: 3 | Status: SHIPPED | OUTPATIENT
Start: 2023-09-15

## 2023-09-15 NOTE — PROGRESS NOTES
Per verbal order from Dr. Amanda Quintana  Last appt: 8/15/2022     Future Appointments   Date Time Provider [address] [place] [address]   9/17/2024  9:40 AM JOSE Bose NP       Requested Prescriptions     Signed Prescriptions Disp Refills    dilTIAZem (CARDIZEM CD) 240 MG extended release capsule 90 capsule 3     Sig: Take 1 capsule by mouth daily

## 2024-02-27 NOTE — PROGRESS NOTES
Bedside shift change report given to Benita Pendleton (oncoming nurse) by Tip Niño (offgoing nurse). Report included the following information SBAR, Procedure Summary, Intake/Output, MAR, Recent Results, and Cardiac Rhythm NSR . Goal Outcome Evaluation:  Plan of Care Reviewed With: patient           Outcome Evaluation: Patient rested in bed during shift. Patient ambulated during shift. Patient has no complaints or concerns at this time. Will continue with plan of care.

## 2024-08-12 RX ORDER — DILTIAZEM HYDROCHLORIDE 240 MG/1
240 CAPSULE, COATED, EXTENDED RELEASE ORAL DAILY
Qty: 90 CAPSULE | Refills: 0 | Status: SHIPPED | OUTPATIENT
Start: 2024-08-12

## 2024-08-12 NOTE — TELEPHONE ENCOUNTER
Per verbal order from Dr. Aminata Pickens  Last appt: 9/15/2023     Future Appointments   Date Time Provider Department Center   9/17/2024  9:40 AM Dianne Sims APRN - [facility]       Requested Prescriptions     Signed Prescriptions Disp Refills    dilTIAZem (CARDIZEM CD) 240 MG extended release capsule 90 capsule 0     Sig: TAKE 1 CAPSULE EVERY DAY     Authorizing Provider: AMINATA PICKENS     Ordering User: LUPIS BELTRE

## 2024-10-07 NOTE — PROGRESS NOTES
Patient: Xiao Pineda  : 1960    Primary Cardiologist: Aminata Olvera MS, MD, [facility]    PCP: Dr. Mtoley    Today's Date: 10/8/2024    ASSESSMENT AND PLAN:     Needs ECG    1. Aflutter vs SVT: [id number] score was 1 (female) but she has a diagnosis of HTN so her [id number] score is 2  -no atrial fib or atrial flutter on loop monitor in   -she prefers to take ASA only for now, continue diltiazem CD 240mg daily   -she will alternate visits between myself and Dr. Olvera going forward  -she will follow up with Dr. Olvera again in 1 year   2. Chest pain- no CAD by cardiac cath in , no ischemia on stress test in , continue diltiazem CD  -has Rx for NTG SL tabs PRN chest pain  3. Body mass index is 36.22 kg/m². exercise limited by orthopedic issues, working on diet/weight loss     4. IGT - exercise limited by orthopedic issues, working on diet/weight loss     5. RA- discussed the risks of CAD associated with RA in the past, on Enbrel injections and MTX, followed by Dr Hatch/Rheumatology   6.  Hypothyroidism - on levothyroxine  7.  Family hx of early CAD - no plaque on coronary calcium scan in , advised her to repeat coronary calcium scan again since 5 years has past and her cholesterol remains borderline high per her report   8.  HTN - well controlled on diltiazem CD    9.  Dyslipidemia -  last year, will request recent lipids from PCP office, recommended repeating coronary calcium scan as above since she reported borderline high cholesterol on recent lab work      Loop Monitor  - no AFib or Flutter, one episode of Atrial Tach  Coronary calcium scan  - zero  Nuc Stress  - no ischemia   Echo  - LVEF 55-60%, no WMA  Cath normal      Soc Hx: no tob no etoh   Fhx + for CAD in her father who had CABG at 42     She  has a past medical history of Arthritis, Atrial fibrillation (HCC) (2018), Autoimmune disease (HCC) (), Chronic pain, Kidney stones,

## 2024-10-08 ENCOUNTER — OFFICE VISIT (OUTPATIENT)
Age: 64
End: 2024-10-08
Payer: MEDICARE

## 2024-10-08 ENCOUNTER — TELEPHONE (OUTPATIENT)
Age: 64
End: 2024-10-08

## 2024-10-08 VITALS
BODY MASS INDEX: 36.1 KG/M2 | WEIGHT: 238.2 LBS | SYSTOLIC BLOOD PRESSURE: 130 MMHG | HEIGHT: 68 IN | DIASTOLIC BLOOD PRESSURE: 80 MMHG | OXYGEN SATURATION: 98 % | HEART RATE: 67 BPM | RESPIRATION RATE: 12 BRPM

## 2024-10-08 DIAGNOSIS — I10 HTN (HYPERTENSION), BENIGN: ICD-10-CM

## 2024-10-08 DIAGNOSIS — I48.3 TYPICAL ATRIAL FLUTTER (HCC): Primary | ICD-10-CM

## 2024-10-08 DIAGNOSIS — E78.5 DYSLIPIDEMIA: ICD-10-CM

## 2024-10-08 DIAGNOSIS — Z82.49 FAMILY HISTORY OF EARLY CAD: ICD-10-CM

## 2024-10-08 PROCEDURE — 1036F TOBACCO NON-USER: CPT | Performed by: NURSE PRACTITIONER

## 2024-10-08 PROCEDURE — 99214 OFFICE O/P EST MOD 30 MIN: CPT | Performed by: NURSE PRACTITIONER

## 2024-10-08 PROCEDURE — 93010 ELECTROCARDIOGRAM REPORT: CPT | Performed by: NURSE PRACTITIONER

## 2024-10-08 PROCEDURE — 93005 ELECTROCARDIOGRAM TRACING: CPT | Performed by: NURSE PRACTITIONER

## 2024-10-08 PROCEDURE — G8427 DOCREV CUR MEDS BY ELIG CLIN: HCPCS | Performed by: NURSE PRACTITIONER

## 2024-10-08 PROCEDURE — 3079F DIAST BP 80-89 MM HG: CPT | Performed by: NURSE PRACTITIONER

## 2024-10-08 PROCEDURE — 3075F SYST BP GE 130 - 139MM HG: CPT | Performed by: NURSE PRACTITIONER

## 2024-10-08 PROCEDURE — G8399 PT W/DXA RESULTS DOCUMENT: HCPCS | Performed by: NURSE PRACTITIONER

## 2024-10-08 PROCEDURE — 3017F COLORECTAL CA SCREEN DOC REV: CPT | Performed by: NURSE PRACTITIONER

## 2024-10-08 PROCEDURE — 1090F PRES/ABSN URINE INCON ASSESS: CPT | Performed by: NURSE PRACTITIONER

## 2024-10-08 PROCEDURE — G8484 FLU IMMUNIZE NO ADMIN: HCPCS | Performed by: NURSE PRACTITIONER

## 2024-10-08 PROCEDURE — G8417 CALC BMI ABV UP PARAM F/U: HCPCS | Performed by: NURSE PRACTITIONER

## 2024-10-08 PROCEDURE — 1123F ACP DISCUSS/DSCN MKR DOCD: CPT | Performed by: NURSE PRACTITIONER

## 2024-10-08 RX ORDER — DILTIAZEM HYDROCHLORIDE 240 MG/1
240 CAPSULE, COATED, EXTENDED RELEASE ORAL DAILY
Qty: 90 CAPSULE | Refills: 3 | Status: SHIPPED | OUTPATIENT
Start: 2024-10-08

## 2024-10-08 NOTE — TELEPHONE ENCOUNTER
Faxed copy of today's note to Dr. Steven Motley @ [phone].  Confirmation received.     Requested copy of recent labs.

## 2024-10-08 NOTE — PROGRESS NOTES
Chief Complaint   Patient presents with    Follow-up     Yearly.  Denies chest pain/shortness of breath. Occasional swelling in ankles (arthritis)/dizziness     /80 (Site: Right Upper Arm, Position: Sitting, Cuff Size: Large Adult)   Pulse 67   Resp 12   Ht 1.727 m (5' 8\")   Wt 108 kg (238 lb 3.2 oz)   SpO2 98%   BMI 36.22 kg/m²

## 2024-10-08 NOTE — TELEPHONE ENCOUNTER
----- Message from JOSE Ha NP sent at 10/8/2024  9:29 AM EDT -----  Please fax office note to PCP and call and request labs BMP or CMP, CBC and lipid results, labs done recently

## 2024-10-21 ENCOUNTER — TELEPHONE (OUTPATIENT)
Age: 64
End: 2024-10-21

## 2024-10-21 NOTE — TELEPHONE ENCOUNTER
Ct cardiac calcium scoring test, patient called to see where she can schedule this at, NP referred her?    P#: [phone]

## 2024-10-21 NOTE — TELEPHONE ENCOUNTER
Spoke with patient after ID x2 and conveyed testing locations and provided the number to central scheduling to schedule CT [facility].

## 2024-11-15 ENCOUNTER — HOSPITAL ENCOUNTER (OUTPATIENT)
Facility: HOSPITAL | Age: 64
Discharge: HOME | End: 2024-11-18

## 2024-11-15 DIAGNOSIS — Z82.49 FAMILY HISTORY OF EARLY CAD: ICD-10-CM

## 2024-11-15 DIAGNOSIS — E78.5 DYSLIPIDEMIA: ICD-10-CM

## 2024-11-15 PROCEDURE — 75571 CT HRT W/O DYE W/CA TEST: CPT

## 2025-08-06 RX ORDER — DILTIAZEM HYDROCHLORIDE 240 MG/1
240 CAPSULE, COATED, EXTENDED RELEASE ORAL DAILY
Qty: 90 CAPSULE | Refills: 1 | Status: SHIPPED | OUTPATIENT
Start: 2025-08-06

## (undated) DEVICE — STERILE POLYISOPRENE POWDER-FREE SURGICAL GLOVES WITH EMOLLIENT COATING: Brand: PROTEXIS

## (undated) DEVICE — SYR 20ML LL STRL LF --

## (undated) DEVICE — STRAP,POSITIONING,KNEE/BODY,FOAM,4X60": Brand: MEDLINE

## (undated) DEVICE — PREP SKN PREVAIL 40ML APPL --

## (undated) DEVICE — T4 HOOD

## (undated) DEVICE — SUTURE VCRL SZ 0 L36IN ABSRB VLT L40MM CT 1/2 CIR J358H

## (undated) DEVICE — REM POLYHESIVE ADULT PATIENT RETURN ELECTRODE: Brand: VALLEYLAB

## (undated) DEVICE — CONTAINER,SPECIMEN,3OZ,OR STRL: Brand: MEDLINE

## (undated) DEVICE — OPEN-END URETERAL CATHETER: Brand: COOK

## (undated) DEVICE — TIP SUCT CRV REG REDI

## (undated) DEVICE — SOLUTION IRRIG 1000ML H2O STRL BLT

## (undated) DEVICE — SUTURE VCRL SZ 2-0 L27IN ABSRB UD L36MM CP-1 1/2 CIR REV J266H

## (undated) DEVICE — YANKAUER,OPEN TIP,W/O VENT,STERILE: Brand: MEDLINE INDUSTRIES, INC.

## (undated) DEVICE — SPONGE GZ W4XL4IN COT 12 PLY TYP VII WVN C FLD DSGN

## (undated) DEVICE — HANDPIECE SET WITH BONE CLEANING TIP AND SUCTION TUBE: Brand: INTERPULSE

## (undated) DEVICE — DERMABOND SKIN ADH 0.7ML -- DERMABOND ADVANCED 12/BX

## (undated) DEVICE — ALCOHOL RUBBING ISO 16OZ 70%

## (undated) DEVICE — SUTURE STRATAFIX SPRL SZ 1 L14IN ABSRB VLT L48CM CTX 1/2 SXPD2B405

## (undated) DEVICE — BIT DRL L5IN DIA2MM STD ST S STL TWST BUSA

## (undated) DEVICE — MARKER,SKIN,WI/RULER AND LABELS: Brand: MEDLINE

## (undated) DEVICE — TOTAL TRAY, DB, 100% SILI FOLEY, 16FR 10: Brand: MEDLINE

## (undated) DEVICE — SUTURE MCRYL SZ 3-0 L27IN ABSRB UD L24MM PS-1 3/8 CIR PRIM Y936H

## (undated) DEVICE — SUTURE VCRL SZ 1 L36IN ABSRB UD L36MM CT-1 1/2 CIR J947H

## (undated) DEVICE — COVER,MAYO STAND,STERILE: Brand: MEDLINE

## (undated) DEVICE — SUTURE ETHBND EXCEL SZ 2 L30IN NONABSORBABLE GRN L40MM V-37 MX69G

## (undated) DEVICE — Device

## (undated) DEVICE — SUTURE VCRL SZ 2-0 L36IN ABSRB UD L40MM CT 1/2 CIR J957H

## (undated) DEVICE — 4-PORT MANIFOLD: Brand: NEPTUNE 2

## (undated) DEVICE — CYSTO-SMH: Brand: MEDLINE INDUSTRIES, INC.

## (undated) DEVICE — SOLUTION IRRIG 3000ML 0.9% SOD CHL FLX CONT 0797208] ICU MEDICAL INC]

## (undated) DEVICE — APPLICATOR BNDG 1MM ADH PREMIERPRO EXOFIN

## (undated) DEVICE — SPONGE GZ W4XL4IN COT RADPQ HIGHLY ABSRB

## (undated) DEVICE — GOWN,PREVENTION PLUS,XLN/2XL,ST,22/CS: Brand: MEDLINE

## (undated) DEVICE — STERILE POLYISOPRENE POWDER-FREE SURGICAL GLOVES: Brand: PROTEXIS

## (undated) DEVICE — SOLUTION IRRIGATION H2O 0797305] ICU MEDICAL INC]

## (undated) DEVICE — SYR 10ML LUER LOK 1/5ML GRAD --

## (undated) DEVICE — ELECTRODE BLDE L4IN NONINSULATED EDGE

## (undated) DEVICE — BLADE SAW W098XL354IN THK0047IN CUT THK0047IN SAG

## (undated) DEVICE — NEEDLE HYPO 21GA L1.5IN INTRAMUSCULAR S STL LATCH BVL UP

## (undated) DEVICE — TOTAL TRAY, 16FR 10ML SIL FOLEY, URN: Brand: MEDLINE

## (undated) DEVICE — Z DUP USE 2275493 DRESSING ALGINATE POST OPERATIVE 10X3.5 IN RECT PRIMASEAL

## (undated) DEVICE — GLOVE ORANGE PI 7 1/2   MSG9075

## (undated) DEVICE — 3M™ IOBAN™ 2 ANTIMICROBIAL INCISE DRAPE 6651EZ: Brand: IOBAN™ 2

## (undated) DEVICE — DRAPE,U/ SHT,SPLIT,PLAS,STERIL: Brand: MEDLINE

## (undated) DEVICE — PAD BD MATTRESS 73X32 IN STD CONVOLUTED FOAM LTX FREE

## (undated) DEVICE — Z DISCONTINUED USE 2744636  DRESSING AQUACEL 14 IN ALG W3.5XL14IN POLYUR FLM CVR W/ HYDRCOLL

## (undated) DEVICE — CYSTO/BLADDER IRRIGATION SET, REGULATING CLAMP

## (undated) DEVICE — DECANTER BAG 9": Brand: MEDLINE INDUSTRIES, INC.

## (undated) DEVICE — SOLUTION IV 50ML 0.9% SOD CHL

## (undated) DEVICE — HEWSON SUTURE RETRIEVER: Brand: HEWSON SUTURE RETRIEVER

## (undated) DEVICE — INFECTION CONTROL KIT SYS

## (undated) DEVICE — PATIENT PROTECTIVE PAD FOR IMP UNIVERSAL LATERAL HIP POSITIONER (ULP) (6/CASE): Brand: PATIENT PROTECTIVE PAD

## (undated) DEVICE — GUIDEWIRE URO L150CM DIA0.035IN PTFE NIT HYDRPHLC STR TIP

## (undated) DEVICE — SCRUB DRY SURG EZ SCRUB BRUSH PREOPERATIVE GRN

## (undated) DEVICE — GLOVE SURG SZ 8 L12IN FNGR THK94MIL STD WHT LTX FREE

## (undated) DEVICE — 3M™ STERI-DRAPE™ 2 INCISE DRAPE 2050: Brand: STERI-DRAPE™

## (undated) DEVICE — DRAPE,REIN 53X77,STERILE: Brand: MEDLINE

## (undated) DEVICE — PREP SKN CHLRAPRP APL 26ML STR --

## (undated) DEVICE — ERASECAUTI INTERMIT TRAY: Brand: MEDLINE INDUSTRIES, INC.

## (undated) DEVICE — (D)PREP SKN CHLRAPRP APPL 26ML -- CONVERT TO ITEM 371833